# Patient Record
Sex: FEMALE | Race: WHITE | NOT HISPANIC OR LATINO | Employment: OTHER | URBAN - METROPOLITAN AREA
[De-identification: names, ages, dates, MRNs, and addresses within clinical notes are randomized per-mention and may not be internally consistent; named-entity substitution may affect disease eponyms.]

---

## 2021-11-09 ENCOUNTER — OFFICE VISIT (OUTPATIENT)
Dept: FAMILY MEDICINE CLINIC | Facility: CLINIC | Age: 66
End: 2021-11-09

## 2021-11-09 ENCOUNTER — HOSPITAL ENCOUNTER (OUTPATIENT)
Dept: GENERAL RADIOLOGY | Facility: HOSPITAL | Age: 66
Discharge: HOME | End: 2021-11-09
Admitting: NURSE PRACTITIONER

## 2021-11-09 VITALS
HEART RATE: 86 BPM | SYSTOLIC BLOOD PRESSURE: 106 MMHG | DIASTOLIC BLOOD PRESSURE: 65 MMHG | WEIGHT: 130 LBS | BODY MASS INDEX: 25.52 KG/M2 | HEIGHT: 60 IN | TEMPERATURE: 98.6 F | RESPIRATION RATE: 14 BRPM | OXYGEN SATURATION: 100 %

## 2021-11-09 DIAGNOSIS — R06.02 SHORTNESS OF BREATH: ICD-10-CM

## 2021-11-09 DIAGNOSIS — R05.9 COUGH: Primary | ICD-10-CM

## 2021-11-09 DIAGNOSIS — R07.9 CHEST PAIN, UNSPECIFIED TYPE: ICD-10-CM

## 2021-11-09 DIAGNOSIS — R05.9 COUGH: ICD-10-CM

## 2021-11-09 PROCEDURE — 93000 ELECTROCARDIOGRAM COMPLETE: CPT | Performed by: NURSE PRACTITIONER

## 2021-11-09 PROCEDURE — 99204 OFFICE O/P NEW MOD 45 MIN: CPT | Performed by: NURSE PRACTITIONER

## 2021-11-09 PROCEDURE — U0004 COV-19 TEST NON-CDC HGH THRU: HCPCS | Performed by: NURSE PRACTITIONER

## 2021-11-09 PROCEDURE — U0005 INFEC AGEN DETEC AMPLI PROBE: HCPCS | Performed by: NURSE PRACTITIONER

## 2021-11-09 PROCEDURE — 71046 X-RAY EXAM CHEST 2 VIEWS: CPT

## 2021-11-09 RX ORDER — AZITHROMYCIN 250 MG/1
TABLET, FILM COATED ORAL
Qty: 6 TABLET | Refills: 0 | Status: SHIPPED | OUTPATIENT
Start: 2021-11-09 | End: 2021-11-14 | Stop reason: HOSPADM

## 2021-11-09 RX ORDER — PREDNISONE 10 MG/1
TABLET ORAL
Qty: 1 EACH | Refills: 0 | Status: SHIPPED | OUTPATIENT
Start: 2021-11-09 | End: 2021-11-14 | Stop reason: HOSPADM

## 2021-11-09 RX ORDER — BENZONATATE 100 MG/1
100 CAPSULE ORAL 3 TIMES DAILY PRN
Qty: 30 CAPSULE | Refills: 1 | Status: SHIPPED | OUTPATIENT
Start: 2021-11-09 | End: 2021-11-16

## 2021-11-09 NOTE — PROGRESS NOTES
Procedure     ECG 12 Lead    Date/Time: 11/9/2021 2:13 PM  Performed by: Ellen Red APRN  Authorized by: Ellen Red APRN   Comparison: not compared with previous ECG   Previous ECG: no previous ECG available  Rhythm: sinus rhythm  Rate: normal  Conduction: conduction normal  ST Segments: ST segments normal  T Waves: T waves normal  QRS axis: normal  Other: no other findings    Clinical impression: normal ECG

## 2021-11-09 NOTE — PROGRESS NOTES
"Subjective   Duyen Odom is a 66 y.o. female presents for   Chief Complaint   Patient presents with   • URI     low grade fever over the last few days, loss of taste, decreased sense of smell, sore throat,        Health Maintenance Due   Topic Date Due   • MAMMOGRAM  Never done   • DXA SCAN  Never done   • COLORECTAL CANCER SCREENING  Never done   • COVID-19 Vaccine (1) Never done   • TDAP/TD VACCINES (1 - Tdap) Never done   • ZOSTER VACCINE (1 of 2) Never done   • INFLUENZA VACCINE  Never done   • Pneumococcal Vaccine 65+ (1 of 1 - PPSV23) Never done   • HEPATITIS C SCREENING  Never done   • ANNUAL WELLNESS VISIT  Never done       History of Present Illness   Pt present with concerns for covid.  She states she has a known contact positive for covid but states it was several weeks ago.  She states she has been having symptoms x 9 days.  She has been running a fever and lost sense of taste in august.  She tested positive for covid in August and was sick x 1 week.  She has not been vaccinated due to illness, and went for a scheduled appt at one time and the pharmacy was out of stock.  She states she currently is experiencing cough, shortness of breath and chest pain that radiates up to left ear.  She states she becomes short of breath and fatigued with activity.  She states she is always chilled.  She has been taking robitussin cough syrup and states it helps for a short period of time. She reports she has been experiencing lower extremity edema until the past week since she has been laying in bed the majority of the time.      Vitals:    11/09/21 1316   BP: 106/65   BP Location: Left arm   Patient Position: Sitting   Cuff Size: Adult   Pulse: 86   Resp: 14   Temp: 98.6 °F (37 °C)   TempSrc: Oral   SpO2: 100%   Weight: 59 kg (130 lb)   Height: 152.4 cm (60\")     Body mass index is 25.39 kg/m².    No current outpatient medications on file prior to visit.     No current facility-administered medications on file " prior to visit.       The following portions of the patient's history were reviewed and updated as appropriate: allergies, current medications, past family history, past medical history, past social history, past surgical history, and problem list.    Review of Systems   Constitutional: Positive for fatigue. Negative for chills and fever.   HENT: Negative for sinus pressure and sore throat.    Eyes: Negative for blurred vision.   Respiratory: Positive for cough and shortness of breath.    Cardiovascular: Positive for chest pain.   Gastrointestinal: Negative for abdominal pain.   Endocrine: Negative.    Genitourinary: Negative.    Musculoskeletal: Negative for arthralgias and joint swelling.   Skin: Negative for color change.   Allergic/Immunologic: Negative.    Neurological: Negative for dizziness.   Hematological: Negative.    Psychiatric/Behavioral: Negative for behavioral problems.       Objective   Physical Exam  Vitals and nursing note reviewed.   Constitutional:       Appearance: Normal appearance. She is well-developed. She is ill-appearing.   HENT:      Head: Normocephalic and atraumatic.      Right Ear: Tympanic membrane, ear canal and external ear normal.      Left Ear: Tympanic membrane, ear canal and external ear normal.      Nose: Congestion present.   Eyes:      Extraocular Movements: Extraocular movements intact.      Conjunctiva/sclera: Conjunctivae normal.      Pupils: Pupils are equal, round, and reactive to light.   Cardiovascular:      Rate and Rhythm: Normal rate and regular rhythm.      Pulses: Normal pulses.      Heart sounds: Normal heart sounds.   Pulmonary:      Effort: Pulmonary effort is normal.      Breath sounds: Wheezing (few, scattered) present.   Abdominal:      General: Bowel sounds are normal.      Palpations: Abdomen is soft.   Genitourinary:     Vagina: Normal.   Musculoskeletal:         General: Normal range of motion.      Cervical back: Normal range of motion and neck supple.    Skin:     General: Skin is warm and dry.   Neurological:      General: No focal deficit present.      Mental Status: She is alert and oriented to person, place, and time.   Psychiatric:         Mood and Affect: Mood normal.         Behavior: Behavior normal.         Judgment: Judgment normal.       PHQ-9 Total Score:      Assessment/Plan   Diagnoses and all orders for this visit:    1. Cough (Primary)  -     COVID-19,APTIMA PANTHER(LAURA),[facility], NP/OP SWAB IN UTM/VTM/SALINE TRANSPORT MEDIA,24 HR TAT - Swab, Nasal Cavity; Future  -     CBC Auto Differential; Future  -     Comprehensive Metabolic Panel; Future  -     COVID-19,APTIMA PANTHER(LAURA),[facility], NP/OP SWAB IN UTM/VTM/SALINE TRANSPORT MEDIA,24 HR TAT - Swab, Nasal Cavity  -     azithromycin (Zithromax Z-Aneesh) 250 MG tablet; Take 2 tablets by mouth on day 1, then 1 tablet daily on days 2-5  Dispense: 6 tablet; Refill: 0  -     benzonatate (Tessalon Perles) 100 MG capsule; Take 1 capsule by mouth 3 (Three) Times a Day As Needed for Cough.  Dispense: 30 capsule; Refill: 1    2. Shortness of breath  -     D-dimer, Quantitative; Future  -     BNP; Future  -     XR Chest PA & Lateral  -     predniSONE (DELTASONE) 10 MG (48) dose pack; Take as directed on pack  Dispense: 1 each; Refill: 0    3. Chest pain, unspecified type   -     BNP; Future  -     ECG 12 Lead        There are no Patient Instructions on file for this visit.

## 2021-11-10 ENCOUNTER — HOSPITAL ENCOUNTER (INPATIENT)
Facility: HOSPITAL | Age: 66
LOS: 3 days | Discharge: HOME | End: 2021-11-14
Attending: INTERNAL MEDICINE | Admitting: INTERNAL MEDICINE

## 2021-11-10 ENCOUNTER — APPOINTMENT (OUTPATIENT)
Dept: CT IMAGING | Facility: HOSPITAL | Age: 66
End: 2021-11-10

## 2021-11-10 DIAGNOSIS — R79.89 POSITIVE D-DIMER: ICD-10-CM

## 2021-11-10 DIAGNOSIS — R07.89 CHEST TIGHTNESS: ICD-10-CM

## 2021-11-10 DIAGNOSIS — R06.00 DYSPNEA, UNSPECIFIED TYPE: ICD-10-CM

## 2021-11-10 DIAGNOSIS — R06.09 DYSPNEA ON EXERTION: ICD-10-CM

## 2021-11-10 DIAGNOSIS — D64.9 ANEMIA, UNSPECIFIED TYPE: ICD-10-CM

## 2021-11-10 DIAGNOSIS — I21.4 NON-STEMI (NON-ST ELEVATED MYOCARDIAL INFARCTION) (HCC): Primary | ICD-10-CM

## 2021-11-10 DIAGNOSIS — R77.8 ELEVATED TROPONIN: ICD-10-CM

## 2021-11-10 PROBLEM — S09.90XA INJURY OF HEAD: Status: ACTIVE | Noted: 2018-08-22

## 2021-11-10 PROBLEM — M25.551 PAIN OF RIGHT HIP JOINT: Status: ACTIVE | Noted: 2018-08-22
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
## 2021-11-10 PROBLEM — Z86.16 HISTORY OF COVID-19: Chronic | Status: ACTIVE | Noted: 2021-11-10

## 2021-11-10 PROBLEM — M25.521 ARTHRALGIA OF RIGHT ELBOW: Status: ACTIVE | Noted: 2018-08-22

## 2021-11-10 LAB
ABO GROUP BLD: NORMAL
ALBUMIN SERPL-MCNC: 3.8 G/DL (ref 3.8–4.8)
ALBUMIN/GLOB SERPL: 1.4 {RATIO} (ref 1.2–2.2)
ALP SERPL-CCNC: 82 IU/L (ref 44–121)
ALT SERPL-CCNC: 9 IU/L (ref 0–32)
AMBIG ABBREV CMP14 DEFAULT: NORMAL
ANION GAP SERPL CALCULATED.3IONS-SCNC: 10 MMOL/L (ref 5–15)
APTT PPP: 24.1 SECONDS (ref 24–31)
AST SERPL-CCNC: 20 IU/L (ref 0–40)
B PARAPERT DNA SPEC QL NAA+PROBE: NOT DETECTED
B PERT DNA SPEC QL NAA+PROBE: NOT DETECTED
BASOPHILS # BLD AUTO: 0.1 10*3/MM3 (ref 0–0.2)
BASOPHILS # BLD AUTO: 0.1 X10E3/UL (ref 0–0.2)
BASOPHILS NFR BLD AUTO: 1 %
BASOPHILS NFR BLD AUTO: 1.9 % (ref 0–1.5)
BILIRUB SERPL-MCNC: 0.2 MG/DL (ref 0–1.2)
BLD GP AB SCN SERPL QL: NEGATIVE
BNP SERPL-MCNC: 96.3 PG/ML (ref 0–100)
BUN SERPL-MCNC: 17 MG/DL (ref 8–23)
BUN SERPL-MCNC: 17 MG/DL (ref 8–27)
BUN/CREAT SERPL: 13 (ref 12–28)
BUN/CREAT SERPL: 13.9 (ref 7–25)
C PNEUM DNA NPH QL NAA+NON-PROBE: NOT DETECTED
CALCIUM SERPL-MCNC: 8.5 MG/DL (ref 8.7–10.3)
CALCIUM SPEC-SCNC: 8 MG/DL (ref 8.6–10.5)
CHLORIDE SERPL-SCNC: 103 MMOL/L (ref 96–106)
CHLORIDE SERPL-SCNC: 104 MMOL/L (ref 98–107)
CO2 SERPL-SCNC: 20 MMOL/L (ref 20–29)
CO2 SERPL-SCNC: 24 MMOL/L (ref 22–29)
CREAT SERPL-MCNC: 1.22 MG/DL (ref 0.57–1)
CREAT SERPL-MCNC: 1.34 MG/DL (ref 0.57–1)
D DIMER PPP FEU-MCNC: 1.21 MG/L FEU (ref 0–0.49)
DEPRECATED RDW RBC AUTO: 43.8 FL (ref 37–54)
EOSINOPHIL # BLD AUTO: 0.1 10*3/MM3 (ref 0–0.4)
EOSINOPHIL # BLD AUTO: 0.1 X10E3/UL (ref 0–0.4)
EOSINOPHIL NFR BLD AUTO: 1 %
EOSINOPHIL NFR BLD AUTO: 1.6 % (ref 0.3–6.2)
ERYTHROCYTE [DISTWIDTH] IN BLOOD BY AUTOMATED COUNT: 19 % (ref 11.7–15.4)
ERYTHROCYTE [DISTWIDTH] IN BLOOD BY AUTOMATED COUNT: 20.7 % (ref 12.3–15.4)
FLUAV SUBTYP SPEC NAA+PROBE: NOT DETECTED
FLUBV RNA ISLT QL NAA+PROBE: NOT DETECTED
GFR SERPL CREATININE-BSD FRML MDRD: 44 ML/MIN/1.73
GLOBULIN SER CALC-MCNC: 2.7 G/DL (ref 1.5–4.5)
GLUCOSE SERPL-MCNC: 96 MG/DL (ref 65–99)
GLUCOSE SERPL-MCNC: ABNORMAL MG/DL
HADV DNA SPEC NAA+PROBE: NOT DETECTED
HCOV 229E RNA SPEC QL NAA+PROBE: NOT DETECTED
HCOV HKU1 RNA SPEC QL NAA+PROBE: NOT DETECTED
HCOV NL63 RNA SPEC QL NAA+PROBE: NOT DETECTED
HCOV OC43 RNA SPEC QL NAA+PROBE: NOT DETECTED
HCT VFR BLD AUTO: 20.7 % (ref 34–46.6)
HCT VFR BLD AUTO: 24.4 % (ref 34–46.6)
HGB BLD-MCNC: 6.2 G/DL (ref 12–15.9)
HGB BLD-MCNC: 6.3 G/DL (ref 11.1–15.9)
HMPV RNA NPH QL NAA+NON-PROBE: NOT DETECTED
HOLD SPECIMEN: NORMAL
HPIV1 RNA SPEC QL NAA+PROBE: NOT DETECTED
HPIV2 RNA SPEC QL NAA+PROBE: NOT DETECTED
HPIV3 RNA NPH QL NAA+PROBE: NOT DETECTED
HPIV4 P GENE NPH QL NAA+PROBE: NOT DETECTED
IMM GRANULOCYTES # BLD AUTO: 0 X10E3/UL (ref 0–0.1)
IMM GRANULOCYTES NFR BLD AUTO: 0 %
INR PPP: 0.94 (ref 0.93–1.1)
LYMPHOCYTES # BLD AUTO: 1.2 10*3/MM3 (ref 0.7–3.1)
LYMPHOCYTES # BLD AUTO: 1.2 X10E3/UL (ref 0.7–3.1)
LYMPHOCYTES NFR BLD AUTO: 17 %
LYMPHOCYTES NFR BLD AUTO: 18.2 % (ref 19.6–45.3)
M PNEUMO IGG SER IA-ACNC: NOT DETECTED
MAGNESIUM SERPL-MCNC: 1.9 MG/DL (ref 1.6–2.4)
MCH RBC QN AUTO: 16.5 PG (ref 26.6–33)
MCH RBC QN AUTO: 18 PG (ref 26.6–33)
MCHC RBC AUTO-ENTMCNC: 25.8 G/DL (ref 31.5–35.7)
MCHC RBC AUTO-ENTMCNC: 30 G/DL (ref 31.5–35.7)
MCV RBC AUTO: 59.9 FL (ref 79–97)
MCV RBC AUTO: 64 FL (ref 79–97)
MICROCYTES BLD QL: NORMAL
MONOCYTES # BLD AUTO: 0.7 10*3/MM3 (ref 0.1–0.9)
MONOCYTES # BLD AUTO: 0.7 X10E3/UL (ref 0.1–0.9)
MONOCYTES NFR BLD AUTO: 10 %
MONOCYTES NFR BLD AUTO: 10.8 % (ref 5–12)
NEUTROPHILS # BLD AUTO: 5 X10E3/UL (ref 1.4–7)
NEUTROPHILS NFR BLD AUTO: 4.3 10*3/MM3 (ref 1.7–7)
NEUTROPHILS NFR BLD AUTO: 67.5 % (ref 42.7–76)
NEUTROPHILS NFR BLD AUTO: 71 %
NRBC BLD AUTO-RTO: 0.1 /100 WBC (ref 0–0.2)
NT-PROBNP SERPL-MCNC: 1187 PG/ML (ref 0–900)
PHOSPHATE SERPL-MCNC: 3.3 MG/DL (ref 2.5–4.5)
PLAT MORPH BLD: NORMAL
PLATELET # BLD AUTO: 484 10*3/MM3 (ref 140–450)
PLATELET # BLD AUTO: 527 X10E3/UL (ref 150–450)
PMV BLD AUTO: 7.1 FL (ref 6–12)
POTASSIUM SERPL-SCNC: 4.3 MMOL/L (ref 3.5–5.2)
POTASSIUM SERPL-SCNC: ABNORMAL MMOL/L
PROCALCITONIN SERPL-MCNC: 0.06 NG/ML (ref 0–0.25)
PROT SERPL-MCNC: 6.5 G/DL (ref 6–8.5)
PROTHROMBIN TIME: 10.5 SECONDS (ref 9.6–11.7)
QT INTERVAL: 374 MS
RBC # BLD AUTO: 3.46 10*6/MM3 (ref 3.77–5.28)
RBC # BLD AUTO: 3.82 X10E6/UL (ref 3.77–5.28)
RH BLD: POSITIVE
RHINOVIRUS RNA SPEC NAA+PROBE: NOT DETECTED
RSV RNA NPH QL NAA+NON-PROBE: NOT DETECTED
SARS-COV-2 ORF1AB RESP QL NAA+PROBE: NOT DETECTED
SARS-COV-2 RNA NPH QL NAA+NON-PROBE: NOT DETECTED
SODIUM SERPL-SCNC: 138 MMOL/L (ref 136–145)
SODIUM SERPL-SCNC: 140 MMOL/L (ref 134–144)
T&S EXPIRATION DATE: NORMAL
TROPONIN T SERPL-MCNC: 0.8 NG/ML (ref 0–0.03)
TROPONIN T SERPL-MCNC: 0.9 NG/ML (ref 0–0.03)
WBC # BLD AUTO: 6.4 10*3/MM3 (ref 3.4–10.8)
WBC # BLD AUTO: 7 X10E3/UL (ref 3.4–10.8)
WBC MORPH BLD: NORMAL
WHOLE BLOOD HOLD SPECIMEN: NORMAL

## 2021-11-10 PROCEDURE — 84484 ASSAY OF TROPONIN QUANT: CPT | Performed by: NURSE PRACTITIONER

## 2021-11-10 PROCEDURE — 25010000002 DIPHENHYDRAMINE PER 50 MG: Performed by: NURSE PRACTITIONER

## 2021-11-10 PROCEDURE — 86900 BLOOD TYPING SEROLOGIC ABO: CPT

## 2021-11-10 PROCEDURE — 80048 BASIC METABOLIC PNL TOTAL CA: CPT | Performed by: NURSE PRACTITIONER

## 2021-11-10 PROCEDURE — 93005 ELECTROCARDIOGRAM TRACING: CPT | Performed by: NURSE PRACTITIONER

## 2021-11-10 PROCEDURE — 86900 BLOOD TYPING SEROLOGIC ABO: CPT | Performed by: NURSE PRACTITIONER

## 2021-11-10 PROCEDURE — 85730 THROMBOPLASTIN TIME PARTIAL: CPT | Performed by: NURSE PRACTITIONER

## 2021-11-10 PROCEDURE — G0378 HOSPITAL OBSERVATION PER HR: HCPCS

## 2021-11-10 PROCEDURE — 86923 COMPATIBILITY TEST ELECTRIC: CPT

## 2021-11-10 PROCEDURE — 86901 BLOOD TYPING SEROLOGIC RH(D): CPT | Performed by: NURSE PRACTITIONER

## 2021-11-10 PROCEDURE — 36415 COLL VENOUS BLD VENIPUNCTURE: CPT | Performed by: NURSE PRACTITIONER

## 2021-11-10 PROCEDURE — 71275 CT ANGIOGRAPHY CHEST: CPT

## 2021-11-10 PROCEDURE — 74176 CT ABD & PELVIS W/O CONTRAST: CPT

## 2021-11-10 PROCEDURE — 86850 RBC ANTIBODY SCREEN: CPT | Performed by: NURSE PRACTITIONER

## 2021-11-10 PROCEDURE — 84145 PROCALCITONIN (PCT): CPT | Performed by: NURSE PRACTITIONER

## 2021-11-10 PROCEDURE — 84466 ASSAY OF TRANSFERRIN: CPT | Performed by: INTERNAL MEDICINE

## 2021-11-10 PROCEDURE — 82306 VITAMIN D 25 HYDROXY: CPT | Performed by: SURGERY

## 2021-11-10 PROCEDURE — 85007 BL SMEAR W/DIFF WBC COUNT: CPT | Performed by: NURSE PRACTITIONER

## 2021-11-10 PROCEDURE — 82728 ASSAY OF FERRITIN: CPT | Performed by: INTERNAL MEDICINE

## 2021-11-10 PROCEDURE — 83735 ASSAY OF MAGNESIUM: CPT | Performed by: NURSE PRACTITIONER

## 2021-11-10 PROCEDURE — 86901 BLOOD TYPING SEROLOGIC RH(D): CPT

## 2021-11-10 PROCEDURE — 99222 1ST HOSP IP/OBS MODERATE 55: CPT | Performed by: NURSE PRACTITIONER

## 2021-11-10 PROCEDURE — 0202U NFCT DS 22 TRGT SARS-COV-2: CPT | Performed by: NURSE PRACTITIONER

## 2021-11-10 PROCEDURE — 85379 FIBRIN DEGRADATION QUANT: CPT | Performed by: NURSE PRACTITIONER

## 2021-11-10 PROCEDURE — 84100 ASSAY OF PHOSPHORUS: CPT | Performed by: NURSE PRACTITIONER

## 2021-11-10 PROCEDURE — 25010000002 METHYLPREDNISOLONE PER 125 MG: Performed by: NURSE PRACTITIONER

## 2021-11-10 PROCEDURE — 99284 EMERGENCY DEPT VISIT MOD MDM: CPT

## 2021-11-10 PROCEDURE — P9016 RBC LEUKOCYTES REDUCED: HCPCS

## 2021-11-10 PROCEDURE — 85610 PROTHROMBIN TIME: CPT | Performed by: NURSE PRACTITIONER

## 2021-11-10 PROCEDURE — 83540 ASSAY OF IRON: CPT | Performed by: INTERNAL MEDICINE

## 2021-11-10 PROCEDURE — 83880 ASSAY OF NATRIURETIC PEPTIDE: CPT | Performed by: NURSE PRACTITIONER

## 2021-11-10 PROCEDURE — 36430 TRANSFUSION BLD/BLD COMPNT: CPT

## 2021-11-10 PROCEDURE — 93010 ELECTROCARDIOGRAM REPORT: CPT | Performed by: INTERNAL MEDICINE

## 2021-11-10 PROCEDURE — 85025 COMPLETE CBC W/AUTO DIFF WBC: CPT | Performed by: NURSE PRACTITIONER

## 2021-11-10 PROCEDURE — 0 IOPAMIDOL PER 1 ML: Performed by: INTERNAL MEDICINE

## 2021-11-10 RX ORDER — DIPHENHYDRAMINE HYDROCHLORIDE 50 MG/ML
25 INJECTION INTRAMUSCULAR; INTRAVENOUS ONCE
Status: COMPLETED | OUTPATIENT
Start: 2021-11-10 | End: 2021-11-10

## 2021-11-10 RX ORDER — PANTOPRAZOLE SODIUM 40 MG/10ML
40 INJECTION, POWDER, LYOPHILIZED, FOR SOLUTION INTRAVENOUS EVERY 12 HOURS SCHEDULED
Status: DISCONTINUED | OUTPATIENT
Start: 2021-11-11 | End: 2021-11-14 | Stop reason: HOSPADM

## 2021-11-10 RX ORDER — CALCIUM GLUCONATE 20 MG/ML
1 INJECTION, SOLUTION INTRAVENOUS AS NEEDED
Status: DISCONTINUED | OUTPATIENT
Start: 2021-11-10 | End: 2021-11-14 | Stop reason: HOSPADM

## 2021-11-10 RX ORDER — SODIUM CHLORIDE 0.9 % (FLUSH) 0.9 %
10 SYRINGE (ML) INJECTION AS NEEDED
Status: DISCONTINUED | OUTPATIENT
Start: 2021-11-10 | End: 2021-11-14 | Stop reason: HOSPADM

## 2021-11-10 RX ORDER — DIPHENOXYLATE HYDROCHLORIDE AND ATROPINE SULFATE 2.5; .025 MG/1; MG/1
1 TABLET ORAL DAILY
Status: DISCONTINUED | OUTPATIENT
Start: 2021-11-10 | End: 2021-11-10

## 2021-11-10 RX ORDER — ONDANSETRON 2 MG/ML
4 INJECTION INTRAMUSCULAR; INTRAVENOUS EVERY 6 HOURS PRN
Status: DISCONTINUED | OUTPATIENT
Start: 2021-11-10 | End: 2021-11-12

## 2021-11-10 RX ORDER — CALCIUM GLUCONATE 20 MG/ML
2 INJECTION, SOLUTION INTRAVENOUS AS NEEDED
Status: DISCONTINUED | OUTPATIENT
Start: 2021-11-10 | End: 2021-11-14 | Stop reason: HOSPADM

## 2021-11-10 RX ORDER — ACETAMINOPHEN 160 MG/5ML
650 SOLUTION ORAL EVERY 4 HOURS PRN
Status: DISCONTINUED | OUTPATIENT
Start: 2021-11-10 | End: 2021-11-14 | Stop reason: HOSPADM

## 2021-11-10 RX ORDER — ALUMINA, MAGNESIA, AND SIMETHICONE 2400; 2400; 240 MG/30ML; MG/30ML; MG/30ML
15 SUSPENSION ORAL EVERY 6 HOURS PRN
Status: DISCONTINUED | OUTPATIENT
Start: 2021-11-10 | End: 2021-11-10

## 2021-11-10 RX ORDER — MAGNESIUM SULFATE HEPTAHYDRATE 40 MG/ML
2 INJECTION, SOLUTION INTRAVENOUS AS NEEDED
Status: DISCONTINUED | OUTPATIENT
Start: 2021-11-10 | End: 2021-11-14 | Stop reason: HOSPADM

## 2021-11-10 RX ORDER — ONDANSETRON 4 MG/1
4 TABLET, FILM COATED ORAL EVERY 6 HOURS PRN
Status: DISCONTINUED | OUTPATIENT
Start: 2021-11-10 | End: 2021-11-12

## 2021-11-10 RX ORDER — MAGNESIUM SULFATE HEPTAHYDRATE 40 MG/ML
4 INJECTION, SOLUTION INTRAVENOUS AS NEEDED
Status: DISCONTINUED | OUTPATIENT
Start: 2021-11-10 | End: 2021-11-14 | Stop reason: HOSPADM

## 2021-11-10 RX ORDER — ACETAMINOPHEN 650 MG/1
650 SUPPOSITORY RECTAL EVERY 4 HOURS PRN
Status: DISCONTINUED | OUTPATIENT
Start: 2021-11-10 | End: 2021-11-14 | Stop reason: HOSPADM

## 2021-11-10 RX ORDER — CHOLECALCIFEROL (VITAMIN D3) 125 MCG
5 CAPSULE ORAL NIGHTLY PRN
Status: DISCONTINUED | OUTPATIENT
Start: 2021-11-10 | End: 2021-11-10

## 2021-11-10 RX ORDER — ACETAMINOPHEN 325 MG/1
650 TABLET ORAL EVERY 4 HOURS PRN
Status: DISCONTINUED | OUTPATIENT
Start: 2021-11-10 | End: 2021-11-14 | Stop reason: HOSPADM

## 2021-11-10 RX ORDER — METHYLPREDNISOLONE SODIUM SUCCINATE 125 MG/2ML
125 INJECTION, POWDER, LYOPHILIZED, FOR SOLUTION INTRAMUSCULAR; INTRAVENOUS ONCE
Status: COMPLETED | OUTPATIENT
Start: 2021-11-10 | End: 2021-11-10

## 2021-11-10 RX ORDER — NITROGLYCERIN 0.4 MG/1
0.4 TABLET SUBLINGUAL
Status: DISCONTINUED | OUTPATIENT
Start: 2021-11-10 | End: 2021-11-14 | Stop reason: HOSPADM

## 2021-11-10 RX ORDER — SODIUM CHLORIDE 0.9 % (FLUSH) 0.9 %
10 SYRINGE (ML) INJECTION EVERY 12 HOURS SCHEDULED
Status: DISCONTINUED | OUTPATIENT
Start: 2021-11-10 | End: 2021-11-14 | Stop reason: HOSPADM

## 2021-11-10 RX ORDER — POTASSIUM CHLORIDE 1.5 G/1.77G
40 POWDER, FOR SOLUTION ORAL AS NEEDED
Status: DISCONTINUED | OUTPATIENT
Start: 2021-11-10 | End: 2021-11-10

## 2021-11-10 RX ORDER — POTASSIUM CHLORIDE 20 MEQ/1
40 TABLET, EXTENDED RELEASE ORAL AS NEEDED
Status: DISCONTINUED | OUTPATIENT
Start: 2021-11-10 | End: 2021-11-10

## 2021-11-10 RX ADMIN — DIPHENHYDRAMINE HYDROCHLORIDE 25 MG: 50 INJECTION, SOLUTION INTRAMUSCULAR; INTRAVENOUS at 20:37

## 2021-11-10 RX ADMIN — DIPHENHYDRAMINE HYDROCHLORIDE 25 MG: 50 INJECTION, SOLUTION INTRAMUSCULAR; INTRAVENOUS at 17:26

## 2021-11-10 RX ADMIN — SODIUM CHLORIDE 1000 ML: 9 INJECTION, SOLUTION INTRAVENOUS at 15:17

## 2021-11-10 RX ADMIN — IOPAMIDOL 100 ML: 755 INJECTION, SOLUTION INTRAVENOUS at 21:06

## 2021-11-10 RX ADMIN — SODIUM CHLORIDE, PRESERVATIVE FREE 10 ML: 5 INJECTION INTRAVENOUS at 20:40

## 2021-11-10 RX ADMIN — THERA TABS 1 TABLET: TAB at 21:11

## 2021-11-10 RX ADMIN — METHYLPREDNISOLONE SODIUM SUCCINATE 125 MG: 125 INJECTION, POWDER, FOR SOLUTION INTRAMUSCULAR; INTRAVENOUS at 17:26

## 2021-11-10 NOTE — ED TRIAGE NOTES
Pt reports not feeling well for past 2 weeks, has had productive cough, congestion and SOA. Pt reports BLE swelling intermittently since August. Pt seen at PCP office yesterday and had blood work done, pt called from office today statins Ddimer level was elevated.

## 2021-11-10 NOTE — ED PROVIDER NOTES
Subjective   History: Patient is a 66-year-old female was called by her primary office to come to ER immediately for elevated dimer that was drawn yesterday in the office.  Saw her PCP yesterday for cough weakness shortness of breath some chest discomfort and leg swelling.  She had a EKG completed chest x-ray blood drawn.  States she has been taking Robitussin over-the-counter for cough.  She was prescribed a Z-Aneesh benzonatate and prednisone in which she has taken 1 dose of the Z-Aneesh starting today.  Chest pain is tightness, fairly constant radiates left ear nothing makes it better or worse.  Has been running a fever but states no fever in the last 48 hours.  She states she does have a history of pulmonary embolism years ago after kidney stone surgery was on blood thinners temporarily afterwards.  Denies blood thinners at this time.  Denies any bloody black tarry stool.      Onset: 1 month worse in the last 2 weeks  Location:   Duration: Waxes wanes  Character: Dyspnea  Aggravating/Alleviating factors: None  Radiation not applicable  Severity: Mild to moderate            Review of Systems   Constitutional: Positive for fatigue and fever. Negative for chills.   HENT: Negative for congestion, sore throat, tinnitus and trouble swallowing.    Eyes: Negative for photophobia, discharge and visual disturbance.   Respiratory: Positive for cough, chest tightness and shortness of breath.    Cardiovascular: Positive for leg swelling. Negative for chest pain and palpitations.   Gastrointestinal: Negative for abdominal pain, diarrhea, nausea and vomiting.   Genitourinary: Negative for dysuria, frequency and urgency.   Musculoskeletal: Negative for back pain and myalgias.   Skin: Negative for rash.   Neurological: Positive for weakness. Negative for dizziness and headaches.   Psychiatric/Behavioral: Negative for confusion.       Past Medical History:   Diagnosis Date   • Anemia    • Chest tightness    • Elevated troponin    •  High platelet count    • Hyperlipidemia    • Hypertension    • Positive D-dimer        Allergies   Allergen Reactions   • Iodinated Diagnostic Agents Hives     HIVES ALL OVER CHEST DURING IVP       Past Surgical History:   Procedure Laterality Date   • ABDOMINAL SURGERY      lap band   •  SECTION     • HYSTERECTOMY     • LAPAROSCOPIC GASTRIC BANDING         Family History   Problem Relation Age of Onset   • Mental illness Mother    • Hypertension Mother    • Diabetes Father    • Heart disease Father        Social History     Socioeconomic History   • Marital status:    Tobacco Use   • Smoking status: Never Smoker   • Smokeless tobacco: Never Used   Vaping Use   • Vaping Use: Never used   Substance and Sexual Activity   • Alcohol use: Yes     Comment: occ.   • Drug use: Never   • Sexual activity: Defer           Objective   Physical Exam  Vitals reviewed.   Constitutional:       General: She is not in acute distress.     Appearance: She is normal weight. She is not toxic-appearing.   HENT:      Head: Normocephalic and atraumatic.      Right Ear: Tympanic membrane normal.      Left Ear: Tympanic membrane normal.      Nose: Nose normal.      Mouth/Throat:      Mouth: Mucous membranes are moist.      Pharynx: Oropharynx is clear.   Eyes:      Extraocular Movements: Extraocular movements intact.      Pupils: Pupils are equal, round, and reactive to light.   Cardiovascular:      Rate and Rhythm: Tachycardia present.      Pulses: Normal pulses.      Heart sounds: Normal heart sounds. No murmur heard.      Pulmonary:      Effort: Pulmonary effort is normal.      Breath sounds: Normal breath sounds. No wheezing, rhonchi or rales.   Abdominal:      General: Abdomen is flat.      Palpations: Abdomen is soft.      Tenderness: There is no abdominal tenderness. There is no guarding or rebound.   Genitourinary:     Rectum: Guaiac result negative.   Musculoskeletal:         General: Normal range of motion.       "Cervical back: Normal range of motion and neck supple.   Skin:     General: Skin is warm and dry.      Coloration: Skin is pale.      Findings: No rash.   Neurological:      Mental Status: She is alert and oriented to person, place, and time.   Psychiatric:         Mood and Affect: Mood normal.         Behavior: Behavior normal.         Thought Content: Thought content normal.         Judgment: Judgment normal.         Procedures           ED Course  ED Course as of 11/11/21 0107   Wed Nov 10, 2021   1731 Dr petersen requested hematology consult, placed in computer [KJ]      ED Course User Index  [KJ] Alba Becerra, APRN      /91 (BP Location: Left arm, Patient Position: Lying)   Pulse 102   Temp 98.1 °F (36.7 °C) (Oral)   Resp 17   Ht 152.4 cm (60\")   Wt 60.1 kg (132 lb 7.9 oz)   SpO2 98%   BMI 25.88 kg/m²   Labs Reviewed   BASIC METABOLIC PANEL - Abnormal; Notable for the following components:       Result Value    Creatinine 1.22 (*)     Calcium 8.0 (*)     eGFR Non  Amer 44 (*)     All other components within normal limits    Narrative:     GFR Normal >60  Chronic Kidney Disease <60  Kidney Failure <15     CBC WITH AUTO DIFFERENTIAL - Abnormal; Notable for the following components:    RBC 3.46 (*)     Hemoglobin 6.2 (*)     Hematocrit 20.7 (*)     MCV 59.9 (*)     MCH 18.0 (*)     MCHC 30.0 (*)     RDW 20.7 (*)     Platelets 484 (*)     Lymphocyte % 18.2 (*)     Basophil % 1.9 (*)     All other components within normal limits    Narrative:     Appended report. These results have been appended to a previously verified report.   TROPONIN (IN-HOUSE) - Abnormal; Notable for the following components:    Troponin T 0.899 (*)     All other components within normal limits    Narrative:     Troponin T Reference Range:  <= 0.03 ng/mL-   Negative for AMI  >0.03 ng/mL-     Abnormal for myocardial necrosis.  Clinicians would have to utilize clinical acumen, EKG, Troponin and serial changes to determine if " it is an Acute Myocardial Infarction or myocardial injury due to an underlying chronic condition.       Results may be falsely decreased if patient taking Biotin.     BNP (IN-HOUSE) - Abnormal; Notable for the following components:    proBNP 1,187.0 (*)     All other components within normal limits    Narrative:     Among patients with dyspnea, NT-proBNP is highly sensitive for the detection of acute congestive heart failure. In addition NT-proBNP of <300 pg/ml effectively rules out acute congestive heart failure with 99% negative predictive value.    Results may be falsely decreased if patient taking Biotin.     TROPONIN (IN-HOUSE) - Abnormal; Notable for the following components:    Troponin T 0.799 (*)     All other components within normal limits    Narrative:     Troponin T Reference Range:  <= 0.03 ng/mL-   Negative for AMI  >0.03 ng/mL-     Abnormal for myocardial necrosis.  Clinicians would have to utilize clinical acumen, EKG, Troponin and serial changes to determine if it is an Acute Myocardial Infarction or myocardial injury due to an underlying chronic condition.       Results may be falsely decreased if patient taking Biotin.     D-DIMER, QUANTITATIVE - Abnormal; Notable for the following components:    D-Dimer, Quantitative 1.10 (*)     All other components within normal limits    Narrative:     Reference Range  --------------------------------------------------------------------     < 0.50   Negative Predictive Value  0.50-0.59   Indeterminate    >= 0.60   Probable VTE             A very low percentage of patients with DVT may yield D-Dimer results   below the cut-off of 0.50 mg/L FEU.  This is known to be more   prevalent in patients with distal DVT.             Results of this test should always be interpreted in conjunction with   the patient's medical history, clinical presentation and other   findings.  Clinical diagnosis should not be based on the result of   [place] D-Dimer alone.  "  RESPIRATORY PANEL PCR W/ COVID-19 (SARS-COV-2) [facility]-HOUSE, NP SWAB IN UTM/VTP, 3-4 HR TAT - Normal    Narrative:     In the setting of a positive respiratory panel with a viral infection PLUS a negative procalcitonin without other underlying concern for bacterial infection, consider observing off antibiotics or discontinuation of antibiotics and continue supportive care. If the respiratory panel is positive for atypical bacterial infection (Bordetella pertussis, Chlamydophila pneumoniae, or Mycoplasma pneumoniae), consider antibiotic de-escalation to target atypical bacterial infection.   MAGNESIUM - Normal   PHOSPHORUS - Normal   PROCALCITONIN - Normal    Narrative:     As a Marker for Sepsis (Non-Neonates):     1. <0.5 ng/mL represents a low risk of severe sepsis and/or septic shock.  2. >2 ng/mL represents a high risk of severe sepsis and/or septic shock.    As a Marker for Lower Respiratory Tract Infections that require antibiotic therapy:  PCT on Admission     Antibiotic Therapy             6-12 Hrs later  >0.5                          Strongly Recommended            >0.25 - <0.5             Recommended  0.1 - 0.25                  Discouraged                       Remeasure/reassess PCT  <0.1                         Strongly Discouraged         Remeasure/reassess PCT      As 28 day mortality risk marker: \"Change in Procalcitonin Result\" (>80% or <=80%) if Day 0 (or Day 1) and Day 4 values are available. Refer to http://www.[employer]-pct-calculator.com/    Change in PCT <=80 %   A decrease of PCT levels below or equal to 80% defines a positive change in PCT test result representing a higher risk for 28-day all-cause mortality of patients diagnosed with severe sepsis or septic shock.    Change in PCT >80 %   A decrease of PCT levels of more than 80% defines a negative change in PCT result representing a lower risk for 28-day all-cause mortality of patients diagnosed with severe sepsis or " septic shock.               PROTIME-INR - Normal   APTT - Normal   SCAN SLIDE   URINE DRUG SCREEN   TROPONIN (IN-HOUSE)   URINALYSIS W/ CULTURE IF INDICATED   HEMOGLOBIN AND HEMATOCRIT, BLOOD   BASIC METABOLIC PANEL   MAGNESIUM   HEMOGLOBIN AND HEMATOCRIT, BLOOD   CBC WITH AUTO DIFFERENTIAL   TYPE AND SCREEN   BB ARMBAND CHECK   PREPARE RBC   CBC AND DIFFERENTIAL    Narrative:     The following orders were created for panel order CBC & Differential.  Procedure                               Abnormality         Status                     ---------                               -----------         ------                     CBC Auto Differential[[id number]]        Abnormal            Final result               Scan Slide[[id number]]                                       Final result                 Please view results for these tests on the individual orders.   EXTRA TUBES    Narrative:     The following orders were created for panel order Extra Tubes.  Procedure                               Abnormality         Status                     ---------                               -----------         ------                     Gold Top - SST[[id number]]                                   Final result               Light Blue Top[[id number]]                                   Final result                 Please view results for these tests on the individual orders.   GOLD TOP - SST   LIGHT BLUE TOP   CBC AND DIFFERENTIAL    Narrative:     The following orders were created for panel order CBC & Differential.  Procedure                               Abnormality         Status                     ---------                               -----------         ------                     CBC Auto Differential[[id number]]                                                         Please view results for these tests on the individual orders.     Medications   sodium chloride 0.9 % flush 10 mL (has no administration in time range)    nitroglycerin (NITROSTAT) SL tablet 0.4 mg (has no administration in time range)   sodium chloride 0.9 % flush 10 mL (10 mL Intravenous Given 11/10/21 2040)   sodium chloride 0.9 % flush 10 mL (has no administration in time range)   acetaminophen (TYLENOL) tablet 650 mg (has no administration in time range)     Or   acetaminophen (TYLENOL) 160 MG/5ML solution 650 mg (has no administration in time range)     Or   acetaminophen (TYLENOL) suppository 650 mg (has no administration in time range)   ondansetron (ZOFRAN) tablet 4 mg (has no administration in time range)     Or   ondansetron (ZOFRAN) injection 4 mg (has no administration in time range)   Magnesium Sulfate 2 gram Bolus, followed by 8 gram infusion (total Mg dose 10 grams)- Mg less than or equal to 1mg/dL (has no administration in time range)     Or   Magnesium Sulfate 2 gram / 50mL Infusion (GIVE X 3 BAGS TO EQUAL 6GM TOTAL DOSE) - Mg 1.1 - 1.5 mg/dl (has no administration in time range)     Or   Magnesium Sulfate 4 gram infusion- Mg 1.6-1.9 mg/dL (has no administration in time range)   calcium gluconate 1g/50ml 0.675% NaCl IV SOLN (has no administration in time range)     And   calcium gluconate 2-0.675 GM/100ML NACL IVPB (has no administration in time range)   pantoprazole (PROTONIX) injection 40 mg (40 mg Intravenous Given 11/11/21 0030)   sodium chloride 0.9 % bolus 1,000 mL (0 mL Intravenous Stopped 11/10/21 1700)   diphenhydrAMINE (BENADRYL) injection 25 mg (25 mg Intravenous Given 11/10/21 1726)   methylPREDNISolone sodium succinate (SOLU-Medrol) injection 125 mg (125 mg Intravenous Given 11/10/21 1726)   diphenhydrAMINE (BENADRYL) injection 25 mg (25 mg Intravenous Given 11/10/21 2037)   iopamidol (ISOVUE-370) 76 % injection 100 mL (100 mL Intravenous Given 11/10/21 2106)     CT Chest Pulmonary Embolism    Result Date: 11/10/2021   1. No acute pulmonary embolic disease. 2. The esophagus is markedly dilated and fluid-filled to the level of the  thoracic inlet. The patient has had a previous gastric lap band procedure. This suggests that the gastric lap band may have too much tension obstructing the gastroesophageal junction. 3. No active pulmonary disease. 4. Additional incidental findings as noted above.  Electronically Signed By-Car Jackson MD On:11/10/2021 9:16 PM This report was finalized on [id number] by  Car Jackson MD.    XR Chest PA & Lateral    Result Date: 11/9/2021  No acute chest finding.  Electronically Signed By-Edel Glass MD On:11/9/2021 4:14 PM This report was finalized on [id number] by  Edel Glass MD.                                         MDM     I examined the patient using the appropriate personal protective equipment.      DISPOSITION:   Chart Review: Blood draw yesterday 11/9/2021 dimer elevated 1.21.  H&H 6.3 and 24.4 platelets 527  Comorbidity:  has a past medical history of Anemia, Chest tightness, Elevated troponin, High platelet count, Hyperlipidemia, Hypertension, and Positive D-dimer.  Differentials:this list is not all inclusive and does not constitute the entirety of considered causes --> STEMI non-STEMI PE pneumonia anemia  ECG: interpreted by ER physician and reviewed by myself:   Labs: CBC H&H 6.2 and 20.7 platelets 44.  Troponin 0 0.899.  Metabolic panel creatinine 1.22 GFR 44.  Blood type O+.  Respiratory panel negative.    Imaging: Was interpreted by physician and reviewed by myself:  CT Chest Pulmonary Embolism    Result Date: 11/10/2021   1. No acute pulmonary embolic disease. 2. The esophagus is markedly dilated and fluid-filled to the level of the thoracic inlet. The patient has had a previous gastric lap band procedure. This suggests that the gastric lap band may have too much tension obstructing the gastroesophageal junction. 3. No active pulmonary disease. 4. Additional incidental findings as noted above.  Electronically Signed By-Car Jackson MD On:11/10/2021 9:16 PM This report was finalized on  [id number] by  Car Jackson MD.    XR Chest PA & Lateral    Result Date: 11/9/2021  No acute chest finding.  Electronically Signed By-Edel Glass MD On:11/9/2021 4:14 PM This report was finalized on [id number] by  Edel Glass MD.      Disposition/Treatment:    IV initiated blood drawn 1 L normal saline given.  Patient does present pale and states she has been short of breath developed some chest tightness over the last 3 days.  H&H 6.2 and 20.7 platelets 44 these results fairly unchanged from yesterday's results.  Elevated troponin 0.899 likely secondary to anemia.  Metabolic panel creatinine 1.22 GFR 44.  PCR panel negative.  Likely patient shortness of breath due to anemia.  She denies any bloody black or tarry stool.  Guaiac stool negative.  Initially patient was sent to CT for CT PE told CT tech she had allergic reaction to contrast dye approximately 30 years ago which gave her hives.  Patient was brought back to the ER.  After discussing with my attending, Dr. Brandt will premedicate patient and continue with CT PE protocol, results pending. Pt is currently 100% on RA, HR 80.  Patient okay with proceeding.  Also completed type and screen and ordered to transfuse 1 unit of PRBC.  Patient will be admitted for further evaluation of anemia, blood transfusion, trend troponins.  Additional lab work BNP ordered.  Pending BNP and CT PE protocol. Admitted to hospitalist services under dr petersen.       Final diagnoses:   Dyspnea, unspecified type   Anemia, unspecified type   Chest tightness   Positive D-dimer   Elevated troponin       ED Disposition  ED Disposition     ED Disposition Condition Comment    Decision to Admit  Level of Care: Telemetry [5]   Diagnosis: Dyspnea, unspecified type [[id number]]   Admitting Physician: BUBBA PETERSEN [[id number]]   Attending Physician: BUBBA PETERSEN [[id number]]            No follow-up provider specified.       Medication List      No changes were made to your prescriptions  during this visit.          Alba Becerra, APRN  11/11/21 0107

## 2021-11-11 ENCOUNTER — APPOINTMENT (OUTPATIENT)
Dept: CARDIOLOGY | Facility: HOSPITAL | Age: 66
End: 2021-11-11

## 2021-11-11 ENCOUNTER — APPOINTMENT (OUTPATIENT)
Dept: NUCLEAR MEDICINE | Facility: HOSPITAL | Age: 66
End: 2021-11-11

## 2021-11-11 ENCOUNTER — INPATIENT HOSPITAL (OUTPATIENT)
Dept: URBAN - METROPOLITAN AREA HOSPITAL 84 | Facility: HOSPITAL | Age: 66
End: 2021-11-11

## 2021-11-11 DIAGNOSIS — R79.89 OTHER SPECIFIED ABNORMAL FINDINGS OF BLOOD CHEMISTRY: ICD-10-CM

## 2021-11-11 DIAGNOSIS — D50.9 IRON DEFICIENCY ANEMIA, UNSPECIFIED: ICD-10-CM

## 2021-11-11 DIAGNOSIS — K59.04 CHRONIC IDIOPATHIC CONSTIPATION: ICD-10-CM

## 2021-11-11 DIAGNOSIS — Z98.84 BARIATRIC SURGERY STATUS: ICD-10-CM

## 2021-11-11 LAB
25(OH)D3 SERPL-MCNC: 7.2 NG/ML
AMPHET+METHAMPHET UR QL: NEGATIVE
ANION GAP SERPL CALCULATED.3IONS-SCNC: 11 MMOL/L (ref 5–15)
BARBITURATES UR QL SCN: NEGATIVE
BASOPHILS # BLD AUTO: 0 10*3/MM3 (ref 0–0.2)
BASOPHILS NFR BLD AUTO: 0.3 % (ref 0–1.5)
BENZODIAZ UR QL SCN: NEGATIVE
BH CV ECHO MEAS - ACS: 1.7 CM
BH CV ECHO MEAS - AO MAX PG (FULL): 7.3 MMHG
BH CV ECHO MEAS - AO MAX PG: 12.2 MMHG
BH CV ECHO MEAS - AO MEAN PG (FULL): 3.4 MMHG
BH CV ECHO MEAS - AO MEAN PG: 6 MMHG
BH CV ECHO MEAS - AO ROOT AREA (BSA CORRECTED): 1.8
BH CV ECHO MEAS - AO ROOT AREA: 6.5 CM^2
BH CV ECHO MEAS - AO ROOT DIAM: 2.9 CM
BH CV ECHO MEAS - AO V2 MAX: 174.3 CM/SEC
BH CV ECHO MEAS - AO V2 MEAN: 116.7 CM/SEC
BH CV ECHO MEAS - AO V2 VTI: 31.2 CM
BH CV ECHO MEAS - ASC AORTA: 3.3 CM
BH CV ECHO MEAS - AVA(I,A): 1.8 CM^2
BH CV ECHO MEAS - AVA(I,D): 1.8 CM^2
BH CV ECHO MEAS - AVA(V,A): 1.6 CM^2
BH CV ECHO MEAS - AVA(V,D): 1.6 CM^2
BH CV ECHO MEAS - BSA(HAYCOCK): 1.6 M^2
BH CV ECHO MEAS - BSA: 1.6 M^2
BH CV ECHO MEAS - BZI_BMI: 25.8 KILOGRAMS/M^2
BH CV ECHO MEAS - BZI_METRIC_HEIGHT: 152.4 CM
BH CV ECHO MEAS - BZI_METRIC_WEIGHT: 59.9 KG
BH CV ECHO MEAS - EDV(CUBED): 54.3 ML
BH CV ECHO MEAS - EDV(MOD-SP4): 63.7 ML
BH CV ECHO MEAS - EDV(TEICH): 61.5 ML
BH CV ECHO MEAS - EF(CUBED): 56.2 %
BH CV ECHO MEAS - EF(MOD-BP): 63 %
BH CV ECHO MEAS - EF(MOD-SP4): 63.1 %
BH CV ECHO MEAS - EF(TEICH): 48.6 %
BH CV ECHO MEAS - ESV(CUBED): 23.8 ML
BH CV ECHO MEAS - ESV(MOD-SP4): 23.5 ML
BH CV ECHO MEAS - ESV(TEICH): 31.6 ML
BH CV ECHO MEAS - FS: 24.1 %
BH CV ECHO MEAS - IVS/LVPW: 1.5
BH CV ECHO MEAS - IVSD: 1.2 CM
BH CV ECHO MEAS - LA DIMENSION(2D): 3 CM
BH CV ECHO MEAS - LA DIMENSION: 3.3 CM
BH CV ECHO MEAS - LA/AO: 1.1
BH CV ECHO MEAS - LV DIASTOLIC VOL/BSA (35-75): 40.7 ML/M^2
BH CV ECHO MEAS - LV MASS(C)D: 114.9 GRAMS
BH CV ECHO MEAS - LV MASS(C)DI: 73.4 GRAMS/M^2
BH CV ECHO MEAS - LV MAX PG: 4.8 MMHG
BH CV ECHO MEAS - LV MEAN PG: 2.6 MMHG
BH CV ECHO MEAS - LV SYSTOLIC VOL/BSA (12-30): 15 ML/M^2
BH CV ECHO MEAS - LV V1 MAX: 109.9 CM/SEC
BH CV ECHO MEAS - LV V1 MEAN: 74.1 CM/SEC
BH CV ECHO MEAS - LV V1 VTI: 21.9 CM
BH CV ECHO MEAS - LVIDD: 3.8 CM
BH CV ECHO MEAS - LVIDS: 2.9 CM
BH CV ECHO MEAS - LVOT AREA: 2.5 CM^2
BH CV ECHO MEAS - LVOT DIAM: 1.8 CM
BH CV ECHO MEAS - LVPWD: 0.78 CM
BH CV ECHO MEAS - MV A MAX VEL: 125.2 CM/SEC
BH CV ECHO MEAS - MV DEC SLOPE: 387.3 CM/SEC^2
BH CV ECHO MEAS - MV DEC TIME: 0.25 SEC
BH CV ECHO MEAS - MV E MAX VEL: 95.5 CM/SEC
BH CV ECHO MEAS - MV E/A: 0.76
BH CV ECHO MEAS - MV MAX PG: 6 MMHG
BH CV ECHO MEAS - MV MEAN PG: 2.4 MMHG
BH CV ECHO MEAS - MV V2 MAX: 122.7 CM/SEC
BH CV ECHO MEAS - MV V2 MEAN: 71.2 CM/SEC
BH CV ECHO MEAS - MV V2 VTI: 35.5 CM
BH CV ECHO MEAS - MVA(VTI): 1.6 CM^2
BH CV ECHO MEAS - PA MAX PG (FULL): 0.64 MMHG
BH CV ECHO MEAS - PA MAX PG: 3.5 MMHG
BH CV ECHO MEAS - PA MEAN PG (FULL): 0.41 MMHG
BH CV ECHO MEAS - PA MEAN PG: 1.8 MMHG
BH CV ECHO MEAS - PA V2 MAX: 93 CM/SEC
BH CV ECHO MEAS - PA V2 MEAN: 64.4 CM/SEC
BH CV ECHO MEAS - PA V2 VTI: 18.7 CM
BH CV ECHO MEAS - PULM A REVS DUR: 0.1 SEC
BH CV ECHO MEAS - PULM A REVS VEL: 32 CM/SEC
BH CV ECHO MEAS - PULM DIAS VEL: 42 CM/SEC
BH CV ECHO MEAS - PULM S/D: 1.9
BH CV ECHO MEAS - PULM SYS VEL: 78.4 CM/SEC
BH CV ECHO MEAS - RV MAX PG: 2.8 MMHG
BH CV ECHO MEAS - RV MEAN PG: 1.4 MMHG
BH CV ECHO MEAS - RV V1 MAX: 84 CM/SEC
BH CV ECHO MEAS - RV V1 MEAN: 57 CM/SEC
BH CV ECHO MEAS - RV V1 VTI: 16 CM
BH CV ECHO MEAS - RVDD: 3.1 CM
BH CV ECHO MEAS - SI(AO): 130.2 ML/M^2
BH CV ECHO MEAS - SI(CUBED): 19.5 ML/M^2
BH CV ECHO MEAS - SI(LVOT): 35.6 ML/M^2
BH CV ECHO MEAS - SI(MOD-SP4): 25.7 ML/M^2
BH CV ECHO MEAS - SI(TEICH): 19.1 ML/M^2
BH CV ECHO MEAS - SV(AO): 203.6 ML
BH CV ECHO MEAS - SV(CUBED): 30.5 ML
BH CV ECHO MEAS - SV(LVOT): 55.7 ML
BH CV ECHO MEAS - SV(MOD-SP4): 40.2 ML
BH CV ECHO MEAS - SV(TEICH): 29.9 ML
BH CV ECHO MEAS - TR MAX VEL: 239 CM/SEC
BH CV LOWER VASCULAR LEFT COMMON FEMORAL AUGMENT: NORMAL
BH CV LOWER VASCULAR LEFT COMMON FEMORAL COMPETENT: NORMAL
BH CV LOWER VASCULAR LEFT COMMON FEMORAL COMPRESS: NORMAL
BH CV LOWER VASCULAR LEFT COMMON FEMORAL PHASIC: NORMAL
BH CV LOWER VASCULAR LEFT COMMON FEMORAL SPONT: NORMAL
BH CV LOWER VASCULAR LEFT DISTAL FEMORAL COMPRESS: NORMAL
BH CV LOWER VASCULAR LEFT GASTRONEMIUS COMPRESS: NORMAL
BH CV LOWER VASCULAR LEFT GREATER SAPH AK COMPRESS: NORMAL
BH CV LOWER VASCULAR LEFT GREATER SAPH BK COMPRESS: NORMAL
BH CV LOWER VASCULAR LEFT LESSER SAPH COMPRESS: NORMAL
BH CV LOWER VASCULAR LEFT MID FEMORAL AUGMENT: NORMAL
BH CV LOWER VASCULAR LEFT MID FEMORAL COMPETENT: NORMAL
BH CV LOWER VASCULAR LEFT MID FEMORAL COMPRESS: NORMAL
BH CV LOWER VASCULAR LEFT MID FEMORAL PHASIC: NORMAL
BH CV LOWER VASCULAR LEFT MID FEMORAL SPONT: NORMAL
BH CV LOWER VASCULAR LEFT PERONEAL COMPRESS: NORMAL
BH CV LOWER VASCULAR LEFT POPLITEAL AUGMENT: NORMAL
BH CV LOWER VASCULAR LEFT POPLITEAL COMPETENT: NORMAL
BH CV LOWER VASCULAR LEFT POPLITEAL COMPRESS: NORMAL
BH CV LOWER VASCULAR LEFT POPLITEAL PHASIC: NORMAL
BH CV LOWER VASCULAR LEFT POPLITEAL SPONT: NORMAL
BH CV LOWER VASCULAR LEFT POSTERIOR TIBIAL COMPRESS: NORMAL
BH CV LOWER VASCULAR LEFT PROXIMAL FEMORAL COMPRESS: NORMAL
BH CV LOWER VASCULAR LEFT SAPHENOFEMORAL JUNCTION COMPRESS: NORMAL
BH CV LOWER VASCULAR RIGHT COMMON FEMORAL AUGMENT: NORMAL
BH CV LOWER VASCULAR RIGHT COMMON FEMORAL COMPETENT: NORMAL
BH CV LOWER VASCULAR RIGHT COMMON FEMORAL COMPRESS: NORMAL
BH CV LOWER VASCULAR RIGHT COMMON FEMORAL PHASIC: NORMAL
BH CV LOWER VASCULAR RIGHT COMMON FEMORAL SPONT: NORMAL
BH CV LOWER VASCULAR RIGHT DISTAL FEMORAL COMPRESS: NORMAL
BH CV LOWER VASCULAR RIGHT GASTRONEMIUS COMPRESS: NORMAL
BH CV LOWER VASCULAR RIGHT GREATER SAPH AK COMPRESS: NORMAL
BH CV LOWER VASCULAR RIGHT GREATER SAPH BK COMPRESS: NORMAL
BH CV LOWER VASCULAR RIGHT LESSER SAPH COMPRESS: NORMAL
BH CV LOWER VASCULAR RIGHT MID FEMORAL AUGMENT: NORMAL
BH CV LOWER VASCULAR RIGHT MID FEMORAL COMPETENT: NORMAL
BH CV LOWER VASCULAR RIGHT MID FEMORAL COMPRESS: NORMAL
BH CV LOWER VASCULAR RIGHT MID FEMORAL PHASIC: NORMAL
BH CV LOWER VASCULAR RIGHT MID FEMORAL SPONT: NORMAL
BH CV LOWER VASCULAR RIGHT PERONEAL COMPRESS: NORMAL
BH CV LOWER VASCULAR RIGHT POPLITEAL AUGMENT: NORMAL
BH CV LOWER VASCULAR RIGHT POPLITEAL COMPETENT: NORMAL
BH CV LOWER VASCULAR RIGHT POPLITEAL COMPRESS: NORMAL
BH CV LOWER VASCULAR RIGHT POPLITEAL PHASIC: NORMAL
BH CV LOWER VASCULAR RIGHT POPLITEAL SPONT: NORMAL
BH CV LOWER VASCULAR RIGHT POSTERIOR TIBIAL COMPRESS: NORMAL
BH CV LOWER VASCULAR RIGHT PROXIMAL FEMORAL COMPRESS: NORMAL
BH CV LOWER VASCULAR RIGHT SAPHENOFEMORAL JUNCTION COMPRESS: NORMAL
BH CV NUCLEAR PRIOR STUDY: 3
BH CV REST NUCLEAR ISOTOPE DOSE: 7.2 MCI
BH CV STRESS BP STAGE 1: NORMAL
BH CV STRESS BP STAGE 2: NORMAL
BH CV STRESS COMMENTS STAGE 1: NORMAL
BH CV STRESS COMMENTS STAGE 2: NORMAL
BH CV STRESS DOSE REGADENOSON STAGE 1: 0.4
BH CV STRESS DURATION MIN STAGE 1: 0
BH CV STRESS DURATION MIN STAGE 2: 4
BH CV STRESS DURATION SEC STAGE 1: 10
BH CV STRESS DURATION SEC STAGE 2: 0
BH CV STRESS HR STAGE 1: 75
BH CV STRESS HR STAGE 2: 91
BH CV STRESS NUCLEAR ISOTOPE DOSE: 21.8 MCI
BH CV STRESS PROTOCOL 1: NORMAL
BH CV STRESS RECOVERY BP: NORMAL MMHG
BH CV STRESS RECOVERY HR: 83 BPM
BH CV STRESS STAGE 1: 1
BH CV STRESS STAGE 2: 2
BILIRUB CONJ SERPL-MCNC: 0.2 MG/DL (ref 0–0.3)
BILIRUB INDIRECT SERPL-MCNC: 0.3 MG/DL
BILIRUB SERPL-MCNC: 0.5 MG/DL (ref 0–1.2)
BILIRUB UR QL STRIP: NEGATIVE
BUN SERPL-MCNC: 15 MG/DL (ref 8–23)
BUN/CREAT SERPL: 16.1 (ref 7–25)
CALCIUM SPEC-SCNC: 7.8 MG/DL (ref 8.6–10.5)
CANNABINOIDS SERPL QL: NEGATIVE
CHLORIDE SERPL-SCNC: 107 MMOL/L (ref 98–107)
CLARITY UR: CLEAR
CO2 SERPL-SCNC: 21 MMOL/L (ref 22–29)
COCAINE UR QL: NEGATIVE
COLOR UR: YELLOW
CREAT SERPL-MCNC: 0.93 MG/DL (ref 0.57–1)
D DIMER PPP FEU-MCNC: 1.1 MG/L (FEU) (ref 0–0.59)
DAT POLY-SP REAG RBC QL: NEGATIVE
DEPRECATED RDW RBC AUTO: 60.4 FL (ref 37–54)
EOSINOPHIL # BLD AUTO: 0 10*3/MM3 (ref 0–0.4)
EOSINOPHIL NFR BLD AUTO: 0 % (ref 0.3–6.2)
ERYTHROCYTE [DISTWIDTH] IN BLOOD BY AUTOMATED COUNT: 27.1 % (ref 12.3–15.4)
FERRITIN SERPL-MCNC: 3.51 NG/ML (ref 13–150)
FOLATE SERPL-MCNC: 11.8 NG/ML (ref 4.78–24.2)
GFR SERPL CREATININE-BSD FRML MDRD: 61 ML/MIN/1.73
GLUCOSE SERPL-MCNC: 156 MG/DL (ref 65–99)
GLUCOSE UR STRIP-MCNC: NEGATIVE MG/DL
HAPTOGLOB SERPL-MCNC: 205 MG/DL (ref 30–200)
HCT VFR BLD AUTO: 24.3 % (ref 34–46.6)
HCT VFR BLD AUTO: 25.1 % (ref 34–46.6)
HCT VFR BLD AUTO: 25.7 % (ref 34–46.6)
HGB BLD-MCNC: 7.6 G/DL (ref 12–15.9)
HGB BLD-MCNC: 7.6 G/DL (ref 12–15.9)
HGB BLD-MCNC: 7.8 G/DL (ref 12–15.9)
HGB UR QL STRIP.AUTO: NEGATIVE
IRON 24H UR-MRATE: 14 MCG/DL (ref 37–145)
IRON SATN MFR SERPL: 3 % (ref 20–50)
KETONES UR QL STRIP: NEGATIVE
LDH SERPL-CCNC: 215 U/L (ref 135–214)
LEUKOCYTE ESTERASE UR QL STRIP.AUTO: NEGATIVE
LV EF 2D ECHO EST: 60 %
LYMPHOCYTES # BLD AUTO: 0.6 10*3/MM3 (ref 0.7–3.1)
LYMPHOCYTES NFR BLD AUTO: 14.9 % (ref 19.6–45.3)
MAGNESIUM SERPL-MCNC: 1.9 MG/DL (ref 1.6–2.4)
MAXIMAL PREDICTED HEART RATE: 155 BPM
MAXIMAL PREDICTED HEART RATE: 155 BPM
MCH RBC QN AUTO: 19.9 PG (ref 26.6–33)
MCHC RBC AUTO-ENTMCNC: 30.1 G/DL (ref 31.5–35.7)
MCV RBC AUTO: 66.1 FL (ref 79–97)
METHADONE UR QL SCN: NEGATIVE
MONOCYTES # BLD AUTO: 0.2 10*3/MM3 (ref 0.1–0.9)
MONOCYTES NFR BLD AUTO: 4 % (ref 5–12)
NEUTROPHILS NFR BLD AUTO: 3.2 10*3/MM3 (ref 1.7–7)
NEUTROPHILS NFR BLD AUTO: 80.8 % (ref 42.7–76)
NITRITE UR QL STRIP: NEGATIVE
NRBC BLD AUTO-RTO: 0.1 /100 WBC (ref 0–0.2)
OPIATES UR QL: NEGATIVE
OXYCODONE UR QL SCN: NEGATIVE
PERCENT MAX PREDICTED HR: 58.71 %
PH UR STRIP.AUTO: 6 [PH] (ref 5–8)
PLATELET # BLD AUTO: 443 10*3/MM3 (ref 140–450)
PMV BLD AUTO: 7.5 FL (ref 6–12)
POTASSIUM SERPL-SCNC: 4.3 MMOL/L (ref 3.5–5.2)
PROT UR QL STRIP: NEGATIVE
RBC # BLD AUTO: 3.8 10*6/MM3 (ref 3.77–5.28)
RETICS # AUTO: 0.05 10*6/MM3 (ref 0.02–0.13)
RETICS/RBC NFR AUTO: 1.38 % (ref 0.7–1.9)
SODIUM SERPL-SCNC: 139 MMOL/L (ref 136–145)
SP GR UR STRIP: 1.05 (ref 1–1.03)
STRESS BASELINE BP: NORMAL MMHG
STRESS BASELINE HR: 75 BPM
STRESS PERCENT HR: 69 %
STRESS POST PEAK BP: NORMAL MMHG
STRESS POST PEAK HR: 91 BPM
STRESS TARGET HR: 132 BPM
STRESS TARGET HR: 132 BPM
TIBC SERPL-MCNC: 435 MCG/DL (ref 298–536)
TRANSFERRIN SERPL-MCNC: 292 MG/DL (ref 200–360)
TROPONIN T SERPL-MCNC: 0.5 NG/ML (ref 0–0.03)
UROBILINOGEN UR QL STRIP: ABNORMAL
VIT B12 BLD-MCNC: 320 PG/ML (ref 211–946)
WBC # BLD AUTO: 3.9 10*3/MM3 (ref 3.4–10.8)

## 2021-11-11 PROCEDURE — 85018 HEMOGLOBIN: CPT | Performed by: NURSE PRACTITIONER

## 2021-11-11 PROCEDURE — 80307 DRUG TEST PRSMV CHEM ANLYZR: CPT | Performed by: NURSE PRACTITIONER

## 2021-11-11 PROCEDURE — 99221 1ST HOSP IP/OBS SF/LOW 40: CPT | Performed by: SURGERY

## 2021-11-11 PROCEDURE — 84446 ASSAY OF VITAMIN E: CPT | Performed by: SURGERY

## 2021-11-11 PROCEDURE — 85014 HEMATOCRIT: CPT | Performed by: INTERNAL MEDICINE

## 2021-11-11 PROCEDURE — 85025 COMPLETE CBC W/AUTO DIFF WBC: CPT | Performed by: INTERNAL MEDICINE

## 2021-11-11 PROCEDURE — 93010 ELECTROCARDIOGRAM REPORT: CPT | Performed by: INTERNAL MEDICINE

## 2021-11-11 PROCEDURE — 82746 ASSAY OF FOLIC ACID SERUM: CPT | Performed by: INTERNAL MEDICINE

## 2021-11-11 PROCEDURE — 25010000002 REGADENOSON 0.4 MG/5ML SOLUTION: Performed by: INTERNAL MEDICINE

## 2021-11-11 PROCEDURE — 78452 HT MUSCLE IMAGE SPECT MULT: CPT | Performed by: INTERNAL MEDICINE

## 2021-11-11 PROCEDURE — 85045 AUTOMATED RETICULOCYTE COUNT: CPT | Performed by: INTERNAL MEDICINE

## 2021-11-11 PROCEDURE — 93018 CV STRESS TEST I&R ONLY: CPT | Performed by: INTERNAL MEDICINE

## 2021-11-11 PROCEDURE — 84165 PROTEIN E-PHORESIS SERUM: CPT | Performed by: INTERNAL MEDICINE

## 2021-11-11 PROCEDURE — 99233 SBSQ HOSP IP/OBS HIGH 50: CPT | Performed by: INTERNAL MEDICINE

## 2021-11-11 PROCEDURE — 83735 ASSAY OF MAGNESIUM: CPT | Performed by: NURSE PRACTITIONER

## 2021-11-11 PROCEDURE — 25010000002 ONDANSETRON PER 1 MG: Performed by: NURSE PRACTITIONER

## 2021-11-11 PROCEDURE — 93017 CV STRESS TEST TRACING ONLY: CPT

## 2021-11-11 PROCEDURE — 86880 COOMBS TEST DIRECT: CPT | Performed by: INTERNAL MEDICINE

## 2021-11-11 PROCEDURE — 84597 ASSAY OF VITAMIN K: CPT | Performed by: SURGERY

## 2021-11-11 PROCEDURE — 83921 ORGANIC ACID SINGLE QUANT: CPT | Performed by: INTERNAL MEDICINE

## 2021-11-11 PROCEDURE — 25010000002 NA FERRIC GLUC CPLX PER 12.5 MG: Performed by: INTERNAL MEDICINE

## 2021-11-11 PROCEDURE — 81003 URINALYSIS AUTO W/O SCOPE: CPT | Performed by: NURSE PRACTITIONER

## 2021-11-11 PROCEDURE — 78452 HT MUSCLE IMAGE SPECT MULT: CPT

## 2021-11-11 PROCEDURE — 82747 ASSAY OF FOLIC ACID RBC: CPT | Performed by: INTERNAL MEDICINE

## 2021-11-11 PROCEDURE — 0 TECHNETIUM SESTAMIBI: Performed by: INTERNAL MEDICINE

## 2021-11-11 PROCEDURE — 82247 BILIRUBIN TOTAL: CPT | Performed by: INTERNAL MEDICINE

## 2021-11-11 PROCEDURE — 82784 ASSAY IGA/IGD/IGG/IGM EACH: CPT | Performed by: INTERNAL MEDICINE

## 2021-11-11 PROCEDURE — 82607 VITAMIN B-12: CPT | Performed by: INTERNAL MEDICINE

## 2021-11-11 PROCEDURE — 93306 TTE W/DOPPLER COMPLETE: CPT

## 2021-11-11 PROCEDURE — 84155 ASSAY OF PROTEIN SERUM: CPT | Performed by: INTERNAL MEDICINE

## 2021-11-11 PROCEDURE — 99221 1ST HOSP IP/OBS SF/LOW 40: CPT | Performed by: NURSE PRACTITIONER

## 2021-11-11 PROCEDURE — 84252 ASSAY OF VITAMIN B-2: CPT | Performed by: SURGERY

## 2021-11-11 PROCEDURE — 0 LIDOCAINE 1 % SOLUTION: Performed by: SURGERY

## 2021-11-11 PROCEDURE — 84591 ASSAY OF NOS VITAMIN: CPT | Performed by: SURGERY

## 2021-11-11 PROCEDURE — 85018 HEMOGLOBIN: CPT | Performed by: INTERNAL MEDICINE

## 2021-11-11 PROCEDURE — 85014 HEMATOCRIT: CPT | Performed by: NURSE PRACTITIONER

## 2021-11-11 PROCEDURE — 84484 ASSAY OF TROPONIN QUANT: CPT | Performed by: NURSE PRACTITIONER

## 2021-11-11 PROCEDURE — 86334 IMMUNOFIX E-PHORESIS SERUM: CPT | Performed by: INTERNAL MEDICINE

## 2021-11-11 PROCEDURE — A9500 TC99M SESTAMIBI: HCPCS | Performed by: INTERNAL MEDICINE

## 2021-11-11 PROCEDURE — 84590 ASSAY OF VITAMIN A: CPT | Performed by: SURGERY

## 2021-11-11 PROCEDURE — 99221 1ST HOSP IP/OBS SF/LOW 40: CPT | Performed by: INTERNAL MEDICINE

## 2021-11-11 PROCEDURE — 80048 BASIC METABOLIC PNL TOTAL CA: CPT | Performed by: INTERNAL MEDICINE

## 2021-11-11 PROCEDURE — 0 MAGNESIUM SULFATE 4 GM/100ML SOLUTION: Performed by: NURSE PRACTITIONER

## 2021-11-11 PROCEDURE — 83010 ASSAY OF HAPTOGLOBIN QUANT: CPT | Performed by: INTERNAL MEDICINE

## 2021-11-11 PROCEDURE — 83615 LACTATE (LD) (LDH) ENZYME: CPT | Performed by: INTERNAL MEDICINE

## 2021-11-11 PROCEDURE — 83921 ORGANIC ACID SINGLE QUANT: CPT | Performed by: SURGERY

## 2021-11-11 PROCEDURE — 84425 ASSAY OF VITAMIN B-1: CPT | Performed by: SURGERY

## 2021-11-11 PROCEDURE — 99222 1ST HOSP IP/OBS MODERATE 55: CPT | Performed by: INTERNAL MEDICINE

## 2021-11-11 PROCEDURE — 82248 BILIRUBIN DIRECT: CPT | Performed by: INTERNAL MEDICINE

## 2021-11-11 PROCEDURE — 93970 EXTREMITY STUDY: CPT

## 2021-11-11 PROCEDURE — 93306 TTE W/DOPPLER COMPLETE: CPT | Performed by: INTERNAL MEDICINE

## 2021-11-11 RX ORDER — LIDOCAINE HYDROCHLORIDE 10 MG/ML
20 INJECTION, SOLUTION INFILTRATION; PERINEURAL ONCE
Status: COMPLETED | OUTPATIENT
Start: 2021-11-11 | End: 2021-11-11

## 2021-11-11 RX ADMIN — PANTOPRAZOLE SODIUM 40 MG: 40 INJECTION, POWDER, FOR SOLUTION INTRAVENOUS at 20:04

## 2021-11-11 RX ADMIN — LIDOCAINE HYDROCHLORIDE 20 ML: 10 INJECTION, SOLUTION INFILTRATION; PERINEURAL at 11:39

## 2021-11-11 RX ADMIN — SODIUM CHLORIDE, PRESERVATIVE FREE 10 ML: 5 INJECTION INTRAVENOUS at 08:17

## 2021-11-11 RX ADMIN — TECHNETIUM TC 99M SESTAMIBI 1 DOSE: 1 INJECTION INTRAVENOUS at 12:51

## 2021-11-11 RX ADMIN — PANTOPRAZOLE SODIUM 40 MG: 40 INJECTION, POWDER, FOR SOLUTION INTRAVENOUS at 00:30

## 2021-11-11 RX ADMIN — PANTOPRAZOLE SODIUM 40 MG: 40 INJECTION, POWDER, FOR SOLUTION INTRAVENOUS at 08:19

## 2021-11-11 RX ADMIN — TECHNETIUM TC 99M SESTAMIBI 1 DOSE: 1 INJECTION INTRAVENOUS at 13:38

## 2021-11-11 RX ADMIN — MAGNESIUM SULFATE HEPTAHYDRATE 4 G: 4 INJECTION, SOLUTION INTRAVENOUS at 20:04

## 2021-11-11 RX ADMIN — ONDANSETRON 4 MG: 2 INJECTION INTRAMUSCULAR; INTRAVENOUS at 20:03

## 2021-11-11 RX ADMIN — SODIUM CHLORIDE, PRESERVATIVE FREE 10 ML: 5 INJECTION INTRAVENOUS at 20:04

## 2021-11-11 RX ADMIN — REGADENOSON 0.4 MG: 0.08 INJECTION, SOLUTION INTRAVENOUS at 13:38

## 2021-11-11 RX ADMIN — SODIUM CHLORIDE 250 MG: 9 INJECTION, SOLUTION INTRAVENOUS at 15:40

## 2021-11-11 NOTE — H&P
[facility] Medicine Services      Patient Name: Duyen Odom  : 1955  MRN: [id number]  Primary Care Physician:  Ellen Red, APRN  Date of admission: 11/10/2021      Subjective      Chief Complaint: Dyspnea    History of Present Illness:  Duyen Odom is a 66 y.o. female w/PMH of HLD, HTN who presents to [facility] ED due to dyspnea.  Patient states dyspnea has progressively worsened over the last 2 weeks, dyspnea is worse with exertion, no aggravating or alleviating factors noted.  Patient admits to cough, fever, chills, fatigue, BLE edema.  Patient denies tobacco use, illegal drug use, alcohol use, nausea, vomiting, melena.  As dyspnea did not improve she decided to go to [facility] ED.      Upon arrival to the ED vitals temp 99.4, , RR 16, /74, O2 sat 100% on room air.  Labs notable for troponin 0 0.899, proBNP 1187, glucose 96, , K4.3, CO2 24, creatinine 1.22, BUN 17, GFR 44, WBC 6.4, Hgb 6.2, platelets 484, neutrophils 67.5.  PCR panel negative.  Covid test negative.  EKG shows sinus rhythm rate 79, low voltage extremity and precordial leads.  Chest x-ray shows no acute chest findings.  Patient treated in the ED with IV Benadryl, 125 mg of IV Solu-Medrol, 1000 mL NS bolus. Chart review shows patient presented to PCP yesterday with complaints of loss of taste, loss of smell with prior Covid diagnosis in 2021, cough, fever, sore throat with positive Covid contact 3 weeks prior, symptoms present for 9 days.  Patient has not been vaccinated.      Review of Systems   Constitutional: Positive for chills, fever and malaise/fatigue.   HENT: Negative.    Eyes: Negative.    Cardiovascular: Positive for dyspnea on exertion and leg swelling.   Respiratory: Positive for cough and shortness of breath.    Endocrine: Negative.    Hematologic/Lymphatic: Negative.    Skin: Negative.    Musculoskeletal: Negative.  Negative for falls.    Gastrointestinal: Negative.  Negative for bloating, abdominal pain, nausea and vomiting.   Genitourinary: Negative.  Negative for dysuria and hematuria.   Neurological: Positive for weakness.   Psychiatric/Behavioral: Negative.    Allergic/Immunologic: Negative.    All other systems reviewed and are negative.       Personal History     Past Medical History:   Diagnosis Date   • Anemia    • Chest tightness    • Elevated troponin    • High platelet count    • Hyperlipidemia    • Hypertension    • Positive D-dimer        Past Surgical History:   Procedure Laterality Date   • ABDOMINAL SURGERY      lap band   •  SECTION     • HYSTERECTOMY     • LAPAROSCOPIC GASTRIC BANDING         Family History: family history includes Diabetes in her father; Heart disease in her father; Hypertension in her mother; Mental illness in her mother. Otherwise pertinent FHx was reviewed and not pertinent to current issue.    Social History:  reports that she has never smoked. She has never used smokeless tobacco. She reports current alcohol use. She reports that she does not use drugs.    Home Medications:  Prior to Admission Medications     Prescriptions Last Dose Informant Patient Reported? Taking?    azithromycin (Zithromax Z-Aneesh) 250 MG tablet 11/10/2021  No Yes    Take 2 tablets by mouth on day 1, then 1 tablet daily on days 2-5    benzonatate (Tessalon Perles) 100 MG capsule 11/10/2021  No Yes    Take 1 capsule by mouth 3 (Three) Times a Day As Needed for Cough.    predniSONE (DELTASONE) 10 MG (48) dose pack 11/10/2021  No Yes    Take as directed on pack            Allergies:  Allergies   Allergen Reactions   • Iodinated Diagnostic Agents Hives     HIVES ALL OVER CHEST DURING IVP       Objective      Vitals:   Temp:  [98.1 °F (36.7 °C)-99.4 °F (37.4 °C)] 98.1 °F (36.7 °C)  Heart Rate:  [] 102  Resp:  [15-20] 17  BP: (112-150)/(56-91) 150/91    Physical Exam  Vitals and nursing note reviewed.   Constitutional:        Appearance: She is ill-appearing.   HENT:      Head: Normocephalic and atraumatic.      Right Ear: External ear normal.      Left Ear: External ear normal.      Nose: Nose normal.      Mouth/Throat:      Mouth: Mucous membranes are dry.   Eyes:      Pupils: Pupils are equal, round, and reactive to light.   Cardiovascular:      Rate and Rhythm: Normal rate and regular rhythm.      Pulses: Normal pulses.      Heart sounds: Normal heart sounds.   Pulmonary:      Effort: Pulmonary effort is normal.      Breath sounds: Examination of the right-upper field reveals wheezing and rales. Examination of the left-upper field reveals wheezing and rales. Examination of the right-middle field reveals wheezing and rales. Examination of the left-middle field reveals wheezing and rales. Wheezing and rales present.   Abdominal:      General: Bowel sounds are increased. There is distension.      Tenderness: There is abdominal tenderness in the epigastric area.   Musculoskeletal:      Cervical back: Normal range of motion.      Right lower le+ Edema present.      Left lower le+ Edema present.   Skin:     General: Skin is warm and dry.   Neurological:      General: No focal deficit present.      Mental Status: She is alert and oriented to person, place, and time. Mental status is at baseline.      Cranial Nerves: No facial asymmetry.      Motor: No weakness.   Psychiatric:         Attention and Perception: Attention and perception normal.         Mood and Affect: Affect is flat.         Speech: Speech normal.         Behavior: Behavior normal. Behavior is cooperative.         Thought Content: Thought content normal.         Cognition and Memory: Cognition and memory normal.         Judgment: Judgment normal.          Result Review    Result Review:  I have personally reviewed the results from the time of this admission to 11/10/2021 23:28 EST and agree with these findings:  [x]  Laboratory  [x]  Microbiology  [x]  Radiology  [x]   EKG/Telemetry   [x]  Cardiology/Vascular   []  Pathology  []  Old records  []  Other:        Assessment/Plan        Active Hospital Problems:  Active Hospital Problems    Diagnosis    • **Symptomatic anemia    • Dyspnea    • Elevated troponin    • Elevated d-dimer    • History of COVID-19      Symptomatic anemia:  -Upon arrival to the ED hemoglobin 6.2  -1 unit packed red blood cells ordered  -Trend H&H every 6 hours  -Check PTT/PT/INR, UA, toxicology screen  -SCDs for VTE prophylaxis   -Fall precautions    Dyspnea:  -CT PE protocol showed no acute pulmonary embolic disease. The esophagus is markedly dilated and fluid-filled to the level of the thoracic inlet. The patient has had a previous gastric lap band procedure. This suggests that the gastric lap band may have too much tension obstructing the gastroesophageal junction   -Consult bariatric surgery for evaluation of CT films and management of a possible GE junction obstruction   -Stat CT abdomen pelvis  -Elevate head of bed 45 degrees continuously  -Continuous pulse oximetry  -N.p.o. until cleared by surgery     Elevated troponin:  -Upon arrival to the ED troponin 0.899  -Consult cardiology for evaluation  -Trend EKG, troponin levels  -Continuous cardiac monitoring    Elevated D-dimer:  -D-dimer on 11/9/2021 1.21, history of COVID-19 August 2021  -CT PE protocol negative for PE  -Recheck D-dimer stat  -BLE venous Doppler studies in a.m.  -Fall precautions    History of COVID-19:  -August 2021        DVT prophylaxis:  Mechanical DVT prophylaxis orders are present.      Home medications verified by nursing and/or pharmacy prior to provider review      CODE STATUS:    Level Of Support Discussed With: Patient  Code Status (Patient has no pulse and is not breathing): CPR (Attempt to Resuscitate)  Medical Interventions (Patient has pulse or is breathing): Full Support    Admission Status:  I believe this patient meets observation status.      I discussed the patient's  findings and my recommendations with patient.      The patient was interviewed wearing appropriate personal protective equipment.      Signature: Electronically signed by OTILIO Nix, 11/10/21, 11:32 PM EST.

## 2021-11-11 NOTE — CONSULTS
Hematology/Oncology Inpatient Consultation    Patient name: Duyen Odom  : 1955  MRN: [id number]  Referring Provider: Dr.Jalaram Whitfield  Reason for Consultation: symptomatic anemia    Chief complaint: dyspnea    History of present illness:    Duyen Odom is a 66 y.o. female who presented to [facility] on 11/10/2021 with complaints of shortness of breath that has progressively gotten worse over the last 2 weeks. Dyspnea is accompanied with fever, cough, chest tightness and BLE. Patient was called by PCP to go to ED after receiving elevated d-dimer that was drawn in the office.  Patient was seen in PCP office yesterday for cough, weakness, shortness of breath, some chest discomfort and leg swelling. Patient was started Z-jaja and prednisone with Robintussin for cough.  Patient has been afebrile for 48 hours.  Patient has history of pulmonary embolism years ago after kidney stone surgery, was started on anticoagulation temporarily afterwards.  Denies black tarry stools, hematochezia or hematemesis.      Upon arrival to the ED vitals temp 99.4, , RR 16, /74, O2 sat 100% on room air.  Labs notable for troponin 0 0.899, proBNP 1187, glucose 96, , K4.3, CO2 24, creatinine 1.22, BUN 17, GFR 44, WBC 6.4, Hgb 6.2, platelets 484, neutrophils 67.5.  PCR panel negative.  Covid test negative.  EKG shows sinus rhythm rate 79, low voltage extremity and precordial leads.  Chest x-ray shows no acute chest findings.  Patient treated in the ED with IV Benadryl, 125 mg of IV Solu-Medrol, 1000 mL NS bolus. Chart review shows patient presented to PCP yesterday with complaints of loss of taste, loss of smell with prior Covid diagnosis in 2021, cough, fever, sore throat with positive Covid contact 3 weeks prior, symptoms present for 9 days.  Patient has not been vaccinated    21  Hematology/Oncology was consulted for symptomatic anemia.  Hemoglobin 6.2, received 1 unit of  PRBC's. CT PE protocol showed no acute PE.  The esophagus is markedly dilated and fluid filled to the level of thoracic inlet.  Patient patient had a previous gastric lap band procedure.  This suggest that the gastric lap band may have too much tension obstructing the gastroesophageal junction.   consulted for evaluation and management of possible GE junction obstruction.  Denies history of malignancy or ever having EGD/colonoscopy.  Denies fever, chills, chest pain, abdominal pain, nausea, vomiting, diarrhea, dysuria, hematuria, melena, hematochezia or hematemesis.  Past history of PE several years ago.      He/She  has a past medical history of Anemia, Chest tightness, Elevated troponin, High platelet count, Hyperlipidemia, Hypertension, and Positive D-dimer.    PCP: Ellen Red APRN    History:  Past Medical History:   Diagnosis Date   • Anemia    • Chest tightness    • Elevated troponin    • High platelet count    • Hyperlipidemia    • Hypertension    • Positive D-dimer    ,   Past Surgical History:   Procedure Laterality Date   • ABDOMINAL SURGERY      lap band   •  SECTION     • HYSTERECTOMY     • LAPAROSCOPIC GASTRIC BANDING     ,   Family History   Problem Relation Age of Onset   • Mental illness Mother    • Hypertension Mother    • Diabetes Father    • Heart disease Father    ,   Social History     Tobacco Use   • Smoking status: Never Smoker   • Smokeless tobacco: Never Used   Vaping Use   • Vaping Use: Never used   Substance Use Topics   • Alcohol use: Yes     Comment: occ.   • Drug use: Never   ,   Medications Prior to Admission   Medication Sig Dispense Refill Last Dose   • azithromycin (Zithromax Z-Aneesh) 250 MG tablet Take 2 tablets by mouth on day 1, then 1 tablet daily on days 2-5 6 tablet 0 11/10/2021 at Unknown time   • benzonatate (Tessalon Perles) 100 MG capsule Take 1 capsule by mouth 3 (Three) Times a Day As Needed for Cough. 30 capsule 1 11/10/2021 at Unknown time   •  predniSONE (DELTASONE) 10 MG (48) dose pack Take as directed on pack 1 each 0 11/10/2021 at Unknown time   , Scheduled Meds:  lidocaine, 20 mL, Subcutaneous, Once  pantoprazole, 40 mg, Intravenous, Q12H  sodium chloride, 10 mL, Intravenous, Q12H    , Continuous Infusions:   , PRN Meds:  •  acetaminophen **OR** acetaminophen **OR** acetaminophen  •  Calcium Gluconate-NaCl **AND** calcium gluconate **AND** Calcium, Ionized  •  magnesium sulfate **OR** magnesium sulfate **OR** magnesium sulfate  •  nitroglycerin  •  ondansetron **OR** ondansetron  •  [COMPLETED] Insert peripheral IV **AND** sodium chloride  •  sodium chloride   Allergies:  Iodinated diagnostic agents    Subjective     ROS:  Review of Systems   Constitutional: Negative for activity change, appetite change, chills, diaphoresis, fatigue, fever and unexpected weight change.   HENT: Negative for congestion, dental problem, ear discharge, ear pain, facial swelling, hearing loss, mouth sores, nosebleeds, sore throat, tinnitus, trouble swallowing and voice change.    Eyes: Negative for photophobia and visual disturbance.   Respiratory: Negative for cough, choking, chest tightness, shortness of breath and wheezing.    Cardiovascular: Negative for chest pain, palpitations and leg swelling.   Gastrointestinal: Negative for abdominal distention, abdominal pain, blood in stool, constipation, diarrhea, nausea and vomiting.   Endocrine: Negative.    Genitourinary: Negative for decreased urine volume, difficulty urinating, dysuria, flank pain, frequency, hematuria and urgency.   Musculoskeletal: Negative for back pain, gait problem, joint swelling, myalgias, neck pain and neck stiffness.   Skin: Negative for color change, rash and wound.   Neurological: Negative for dizziness, tremors, syncope, speech difficulty, weakness, numbness and headaches.   Hematological: Negative.    Psychiatric/Behavioral: Negative.         Objective   Vital Signs:   /70   Pulse 81    "Temp 97.9 °F (36.6 °C) (Oral)   Resp 16   Ht 152.4 cm (60\")   Wt 60.1 kg (132 lb 7.9 oz)   SpO2 97%   BMI 25.88 kg/m²     Physical Exam: (performed by MD)  Physical Exam  Vitals and nursing note reviewed. Exam conducted with a chaperone present.   Constitutional:       General: She is not in acute distress.     Appearance: Normal appearance. She is normal weight. She is not ill-appearing, toxic-appearing or diaphoretic.   HENT:      Head: Normocephalic and atraumatic.      Right Ear: Tympanic membrane, ear canal and external ear normal.      Left Ear: Tympanic membrane, ear canal and external ear normal.      Nose: Nose normal. No congestion or rhinorrhea.      Mouth/Throat:      Mouth: Mucous membranes are moist.      Pharynx: Oropharynx is clear.   Eyes:      General:         Right eye: No discharge.         Left eye: No discharge.      Extraocular Movements: Extraocular movements intact.      Conjunctiva/sclera: Conjunctivae normal.      Pupils: Pupils are equal, round, and reactive to light.   Neck:      Vascular: No carotid bruit.   Cardiovascular:      Rate and Rhythm: Normal rate and regular rhythm.      Pulses: Normal pulses.      Heart sounds: Normal heart sounds. No murmur heard.  No friction rub. No gallop.    Pulmonary:      Effort: Pulmonary effort is normal. No respiratory distress.      Breath sounds: Normal breath sounds. No stridor. No wheezing, rhonchi or rales.   Chest:      Chest wall: No tenderness.   Abdominal:      General: Abdomen is flat. Bowel sounds are normal. There is no distension.      Palpations: Abdomen is soft. There is no mass.      Tenderness: There is abdominal tenderness. There is no guarding or rebound.      Hernia: No hernia is present.   Genitourinary:     Rectum: Normal.   Musculoskeletal:         General: No swelling, tenderness or signs of injury. Normal range of motion.      Cervical back: Normal range of motion and neck supple. No rigidity or tenderness.      Right " lower leg: Edema present.      Left lower leg: Edema present.   Lymphadenopathy:      Cervical: No cervical adenopathy.   Skin:     General: Skin is warm and dry.      Capillary Refill: Capillary refill takes less than 2 seconds.      Coloration: Skin is not jaundiced.      Findings: No erythema or rash.   Neurological:      General: No focal deficit present.      Mental Status: She is alert and oriented to person, place, and time. Mental status is at baseline.      Cranial Nerves: No cranial nerve deficit.      Sensory: No sensory deficit.      Motor: No weakness.      Coordination: Coordination normal.      Gait: Gait normal.      Deep Tendon Reflexes: Reflexes normal.   Psychiatric:         Mood and Affect: Mood normal.         Behavior: Behavior normal.         Thought Content: Thought content normal.         Judgment: Judgment normal.         Results Review:  Lab Results (last 48 hours)       Procedure Component Value Units Date/Time    CBC & Differential [[id number]] Collected: 11/11/21 0921    Specimen: Blood Updated: 11/11/21 1018    Narrative:      The following orders were created for panel order CBC & Differential.  Procedure                               Abnormality         Status                     ---------                               -----------         ------                     CBC Auto Differential[[id number]]                            In process                 Scan Slide[[id number]]                                       In process                   Please view results for these tests on the individual orders.    Scan Slide [[id number]] Collected: 11/11/21 0921    Specimen: Blood Updated: 11/11/21 1018    CBC Auto Differential [[id number]] Collected: 11/11/21 0921    Specimen: Blood Updated: 11/11/21 1010    Ferritin [[id number]]  (Abnormal) Collected: 11/10/21 1952    Specimen: Blood Updated: 11/11/21 0812     Ferritin 3.51 ng/mL     Narrative:      Results may be falsely decreased if patient  taking Biotin.      Iron Profile [[id number]]  (Abnormal) Collected: 11/10/21 1952    Specimen: Blood Updated: 11/11/21 0807     Iron 14 mcg/dL      Iron Saturation 3 %      Transferrin 292 mg/dL      TIBC 435 mcg/dL     Troponin [[id number]]  (Abnormal) Collected: 11/11/21 0116    Specimen: Blood Updated: 11/11/21 0207     Troponin T 0.499 ng/mL     Narrative:      Troponin T Reference Range:  <= 0.03 ng/mL-   Negative for AMI  >0.03 ng/mL-     Abnormal for myocardial necrosis.  Clinicians would have to utilize clinical acumen, EKG, Troponin and serial changes to determine if it is an Acute Myocardial Infarction or myocardial injury due to an underlying chronic condition.       Results may be falsely decreased if patient taking Biotin.      Basic Metabolic Panel [[id number]]  (Abnormal) Collected: 11/11/21 0116    Specimen: Blood Updated: 11/11/21 0202     Glucose 156 mg/dL      BUN 15 mg/dL      Creatinine 0.93 mg/dL      Sodium 139 mmol/L      Potassium 4.3 mmol/L      Chloride 107 mmol/L      CO2 21.0 mmol/L      Calcium 7.8 mg/dL      eGFR Non African Amer 61 mL/min/1.73      BUN/Creatinine Ratio 16.1     Anion Gap 11.0 mmol/L     Narrative:      GFR Normal >60  Chronic Kidney Disease <60  Kidney Failure <15      Magnesium [[id number]]  (Normal) Collected: 11/11/21 0116    Specimen: Blood Updated: 11/11/21 0202     Magnesium 1.9 mg/dL     Hemoglobin & Hematocrit, Blood [[id number]]  (Abnormal) Collected: 11/11/21 0116    Specimen: Blood Updated: 11/11/21 0149     Hemoglobin 7.8 g/dL      Hematocrit 25.7 %     D-dimer, Quantitative [[id number]]  (Abnormal) Collected: 11/10/21 2123    Specimen: Blood Updated: 11/11/21 0023     D-Dimer, Quantitative 1.10 mg/L (FEU)     Narrative:      Reference Range  --------------------------------------------------------------------     < 0.50   Negative Predictive Value  0.50-0.59   Indeterminate    >= 0.60   Probable VTE             A very low percentage of patients with DVT may  "yield D-Dimer results   below the cut-off of 0.50 mg/L FEU.  This is known to be more   prevalent in patients with distal DVT.             Results of this test should always be interpreted in conjunction with   the patient's medical history, clinical presentation and other   findings.  Clinical diagnosis should not be based on the result of   [place] D-Dimer alone.    aPTT [[id number]]  (Normal) Collected: 11/10/21 2123    Specimen: Blood Updated: 11/10/21 2237     PTT 24.1 seconds     Protime-INR [[id number]]  (Normal) Collected: 11/10/21 1511    Specimen: Blood Updated: 11/10/21 2135     Protime 10.5 Seconds      INR 0.94    Procalcitonin [[id number]]  (Normal) Collected: 11/10/21 1952    Specimen: Blood Updated: 11/10/21 2048     Procalcitonin 0.06 ng/mL     Narrative:      As a Marker for Sepsis (Non-Neonates):     1. <0.5 ng/mL represents a low risk of severe sepsis and/or septic shock.  2. >2 ng/mL represents a high risk of severe sepsis and/or septic shock.    As a Marker for Lower Respiratory Tract Infections that require antibiotic therapy:  PCT on Admission     Antibiotic Therapy             6-12 Hrs later  >0.5                          Strongly Recommended            >0.25 - <0.5             Recommended  0.1 - 0.25                  Discouraged                       Remeasure/reassess PCT  <0.1                         Strongly Discouraged         Remeasure/reassess PCT      As 28 day mortality risk marker: \"Change in Procalcitonin Result\" (>80% or <=80%) if Day 0 (or Day 1) and Day 4 values are available. Refer to http://www.[email or web address]-pct-calculator.com/    Change in PCT <=80 %   A decrease of PCT levels below or equal to 80% defines a positive change in PCT test result representing a higher risk for 28-day all-cause mortality of patients diagnosed with severe sepsis or septic shock.    Change in PCT >80 %   A decrease of PCT levels of more than 80% defines a negative change in PCT result representing a lower " risk for 28-day all-cause mortality of patients diagnosed with severe sepsis or septic shock.                Troponin [[id number]]  (Abnormal) Collected: 11/10/21 1952    Specimen: Blood Updated: 11/10/21 2044     Troponin T 0.799 ng/mL     Narrative:      Troponin T Reference Range:  <= 0.03 ng/mL-   Negative for AMI  >0.03 ng/mL-     Abnormal for myocardial necrosis.  Clinicians would have to utilize clinical acumen, EKG, Troponin and serial changes to determine if it is an Acute Myocardial Infarction or myocardial injury due to an underlying chronic condition.       Results may be falsely decreased if patient taking Biotin.      Phosphorus [[id number]]  (Normal) Collected: 11/10/21 1952    Specimen: Blood Updated: 11/10/21 2039     Phosphorus 3.3 mg/dL     Magnesium [[id number]]  (Normal) Collected: 11/10/21 1952    Specimen: Blood Updated: 11/10/21 2039     Magnesium 1.9 mg/dL     BNP [[id number]]  (Abnormal) Collected: 11/10/21 1511    Specimen: Blood Updated: 11/10/21 1742     proBNP 1,187.0 pg/mL     Narrative:      Among patients with dyspnea, NT-proBNP is highly sensitive for the detection of acute congestive heart failure. In addition NT-proBNP of <300 pg/ml effectively rules out acute congestive heart failure with 99% negative predictive value.    Results may be falsely decreased if patient taking Biotin.      Extra Tubes [[id number]] Collected: 11/10/21 1511    Specimen: Blood Updated: 11/10/21 1617    Narrative:      The following orders were created for panel order Extra Tubes.  Procedure                               Abnormality         Status                     ---------                               -----------         ------                     Gold Top - SST[[id number]]                                   Final result               Light Blue Top[[id number]]                                   Final result                 Please view results for these tests on the individual orders.    Gold Top - SST  [[id number]] Collected: 11/10/21 1511    Specimen: Blood Updated: 11/10/21 1617     Extra Tube Hold for add-ons.     Comment: Auto resulted.       Light Blue Top [[id number]] Collected: 11/10/21 1511    Specimen: Blood Updated: 11/10/21 1617     Extra Tube hold for add-on     Comment: Auto resulted       CBC & Differential [[id number]]  (Abnormal) Collected: 11/10/21 1511    Specimen: Blood Updated: 11/10/21 1602    Narrative:      The following orders were created for panel order CBC & Differential.  Procedure                               Abnormality         Status                     ---------                               -----------         ------                     CBC Auto Differential[[id number]]        Abnormal            Final result               Scan Slide[[id number]]                                       Final result                 Please view results for these tests on the individual orders.    CBC Auto Differential [[id number]]  (Abnormal) Collected: 11/10/21 1511    Specimen: Blood Updated: 11/10/21 1602     WBC 6.40 10*3/mm3      RBC 3.46 10*6/mm3      Hemoglobin 6.2 g/dL      Hematocrit 20.7 %      MCV 59.9 fL      MCH 18.0 pg      MCHC 30.0 g/dL      RDW 20.7 %      RDW-SD 43.8 fl      MPV 7.1 fL      Platelets 484 10*3/mm3      Neutrophil % 67.5 %      Lymphocyte % 18.2 %      Monocyte % 10.8 %      Eosinophil % 1.6 %      Basophil % 1.9 %      Neutrophils, Absolute 4.30 10*3/mm3      Lymphocytes, Absolute 1.20 10*3/mm3      Monocytes, Absolute 0.70 10*3/mm3      Eosinophils, Absolute 0.10 10*3/mm3      Basophils, Absolute 0.10 10*3/mm3      nRBC 0.1 /100 WBC     Narrative:      Appended report. These results have been appended to a previously verified report.    Scan Slide [[id number]] Collected: 11/10/21 1511    Specimen: Blood Updated: 11/10/21 1602     Microcytes Mod/2+     WBC Morphology Normal     Platelet Morphology Normal    Respiratory Panel PCR w/COVID-19(SARS-CoV-2)  MIRIAM/JENNY/CELINE/PAD/COR/MAD/[facility] In-House, NP Swab in UTM/VTM, 3-4 HR TAT - Swab, Nasopharynx [[id number]]  (Normal) Collected: 11/10/21 1501    Specimen: Swab from Nasopharynx Updated: 11/10/21 1559     ADENOVIRUS, PCR Not Detected     Coronavirus 229E Not Detected     Coronavirus HKU1 Not Detected     Coronavirus NL63 Not Detected     Coronavirus OC43 Not Detected     COVID19 Not Detected     Human Metapneumovirus Not Detected     Human Rhinovirus/Enterovirus Not Detected     Influenza A PCR Not Detected     Influenza B PCR Not Detected     Parainfluenza Virus 1 Not Detected     Parainfluenza Virus 2 Not Detected     Parainfluenza Virus 3 Not Detected     Parainfluenza Virus 4 Not Detected     RSV, PCR Not Detected     Bordetella pertussis pcr Not Detected     Bordetella parapertussis PCR Not Detected     Chlamydophila pneumoniae PCR Not Detected     Mycoplasma pneumo by PCR Not Detected    Narrative:      In the setting of a positive respiratory panel with a viral infection PLUS a negative procalcitonin without other underlying concern for bacterial infection, consider observing off antibiotics or discontinuation of antibiotics and continue supportive care. If the respiratory panel is positive for atypical bacterial infection (Bordetella pertussis, Chlamydophila pneumoniae, or Mycoplasma pneumoniae), consider antibiotic de-escalation to target atypical bacterial infection.    Troponin [[id number]]  (Abnormal) Collected: 11/10/21 1511    Specimen: Blood Updated: 11/10/21 1548     Troponin T 0.899 ng/mL     Narrative:      Troponin T Reference Range:  <= 0.03 ng/mL-   Negative for AMI  >0.03 ng/mL-     Abnormal for myocardial necrosis.  Clinicians would have to utilize clinical acumen, EKG, Troponin and serial changes to determine if it is an Acute Myocardial Infarction or myocardial injury due to an underlying chronic condition.       Results may be falsely decreased if patient taking Biotin.      Basic Metabolic Panel  [[id number]]  (Abnormal) Collected: 11/10/21 1511    Specimen: Blood Updated: 11/10/21 1542     Glucose 96 mg/dL      BUN 17 mg/dL      Creatinine 1.22 mg/dL      Sodium 138 mmol/L      Potassium 4.3 mmol/L      Chloride 104 mmol/L      CO2 24.0 mmol/L      Calcium 8.0 mg/dL      eGFR Non African Amer 44 mL/min/1.73      BUN/Creatinine Ratio 13.9     Anion Gap 10.0 mmol/L     Narrative:      GFR Normal >60  Chronic Kidney Disease <60  Kidney Failure <15               Pending Results:     Imaging Reviewed:   CT Abdomen Pelvis Without Contrast    Result Date: 11/11/2021  1. Marked fluid distention of the included esophagus, status post lap band placement. This suggests an abnormally tight band resulting in obstruction. Given the degree of esophageal distention, some degree of chronicity is likely present. 2. There is otherwise no evidence of acute disease in the abdomen or pelvis. 3. Evidence of recent contrast administration. Electronically signed by:  German Gardner M.D.  11/10/2021 10:08 PM    CT Chest Pulmonary Embolism    Result Date: 11/10/2021   1. No acute pulmonary embolic disease. 2. The esophagus is markedly dilated and fluid-filled to the level of the thoracic inlet. The patient has had a previous gastric lap band procedure. This suggests that the gastric lap band may have too much tension obstructing the gastroesophageal junction. 3. No active pulmonary disease. 4. Additional incidental findings as noted above.  Electronically Signed By-Car Jackson MD On:11/10/2021 9:16 PM This report was finalized on [id number] by  Car Jackson MD.    XR Chest PA & Lateral    Result Date: 11/9/2021  No acute chest finding.  Electronically Signed By-Edel Glass MD On:11/9/2021 4:14 PM This report was finalized on [id number] by  Edel Glass MD.           Assessment/Plan   ASSESSMENT  Symptomatic anemia: Hemoglobin 6.2-admission: received 1 unit of PRBC's. Hemoglobin today is 7.6. Iron sat 3%, iron 14,  transferrin 292, TIBC 435, ferritin 3.51. Differentials include GI bleed, inflammation, infection, DIC, malabsorption, dietary deficiencies, heart, kidney disease.  Will give patient iv iron due to iron sat being 3% and will obtain anemia work up.  Most likely anemia is related to GE junction. FOBT pending, Doppler lower extremities- pending;  CT PE protocol showed no acute PE.  The esophagus is markedly dilated and fluid filled to the level of thoracic inlet.  Patient patient had a previous gastric lap band procedure.  This suggest that the gastric lap band may have too much tension obstructing the gastroesophageal junction.   consulted for evaluation and management of possible GE junction obstruction.  Denies history of malignancy or ever having EGD/colonoscopy.  Denies fever, chills, chest pain, abdominal pain, nausea, vomiting, diarrhea, dysuria, hematuria, melena, hematochezia or hematemesis.  Past history of PE several years ago.    Dyspnea: has improved. Patient on room air  Elevated D-dimer: CTA chest negative for PE, other findings noted, general surgery consulted for possible GE junction obstruction; pending doppler for DVT  Elevated troponin: Likely demand ischemia from anemia.  Echo ordered. Cardiology pending  DVT prophylaxis: SCD for now  Hx PE: has been several years ago. Was on anticoagulants for short period, No longer on anticoagulation.  CTA negative for PE    PLAN  1. Administer Ferrlecit 250mg IVPB today  2. General surgery consulted for possible GE junction obstruction.   3. GI consult for anemia and outpatient colonoscopy  4. Obtain anemia profile  5. Pending bilateral doppler LE to rule out DVT  6. Pending FOBT  7. Pending ECHO  8. Continue supportive care  9. Will continue follow up.       Electronically signed by OTILIO Mahmood, 11/11/21, 11:33 AM EST.          Thank you for this consult. We will be happy to follow along with you.       Ms. Odom was admitted with symptoms of  anemia.  She also had very significant pica.  Her laboratory exams were consistent with both severe anemia and clear iron deficiency.  She has a history of a myeloproliferative disorder that was treated 25 years before this admission and she seems to have had a very good response.  At this point there is no suggestion of myeloproliferative disorder.  She has not seen her physician in more than 10 years.  She did have hematochezia sometime ago.  It lasted briefly and she underwent a colonoscopy that apparently did not disclose the source of the bleeding.  After that she became asymptomatic.  On direct questioning she does not have many more symptoms than the above.  She has been afebrile.  She has been free of chest pains.  She eats well and has not had any fluctuations in weight.  She is free of dysphagia and denies hematemesis.  She has not been coughing more than usual and does not have hemoptysis.  She is also free of abdominal pain.  Lately she has neither had melena nor hematochezia.  No dysuria or hematuria.  No vaginal bleeding or discharge.  She has been free of peripheral edema or skin rashes.At the time of admission she had severe anemia with very severe microcytosis and moderate thrombocytosis, all of which, of course, are highly suggestive of iron deficiency.  Her ferritin was only 3.5 and she had a total iron binding capacity in the upper part of the range of normalcy with a very low saturation.  This confirms with very little place to doubt iron deficiency.  This most likely is the result of chronic blood loss from the gastrointestinal tract.  This in spite of the fact that she has not had melena or hematochezia lately.  The rest of the work-up for anemia is completely unremarkable and there is no suggestion of hemolysis.  She is to have an upper gastrointestinal and lower gastrointestinal endoscopy tomorrow that hopefully will clarify the source of bleeding.  Given her lack of symptoms I suspect that  angiodysplasia or something along those lines is going to be responsible for chronic bleeding.  I will continue to follow her.  At this point that she will be treated with iron and will receive blood transfusion.    Alex Becerra MD on November 11, 2021 at 5 PM

## 2021-11-11 NOTE — PROGRESS NOTES
[facility] Medicine Services Daily Progress Note    Patient Name: Duyen Odom  : 1955  MRN: [id number]  Primary Care Physician:  Ellen Red, APRN  Date of admission: 11/10/2021      Subjective      Chief Complaint: Dyspnea    2021: Patient seen and examined this morning.  Feels better compared to yesterday.  Dyspnea improving.  No dizziness or syncope.  States she has not followed up with any doctors for a while, last saw her PCP in early  and denies any history of anemia, no history of malignancy or ever had EGD/colonoscopy.    Denies any associated fever, chills, chest pain, abdominal pain, nausea, vomiting or diarrhea, dysuria, hematuria, melena, hematochezia, or hematemesis.    Objective      Vitals:   Temp:  [97.5 °F (36.4 °C)-99.4 °F (37.4 °C)] 97.9 °F (36.6 °C)  Heart Rate:  [] 81  Resp:  [15-20] 16  BP: (109-150)/(56-91) 113/70    Physical Exam:    General: Awake, alert, elderly female, lying in bed, NAD  Eyes: PERRL, EOMI, conjunctive are clear  Cardiovascular: Regular rate and rhythm, no murmurs  Respiratory: Clear to auscultation bilaterally, no wheezing or rales, unlabored breathing  Abdomen: Soft, nontender, positive bowel sounds, no guarding  Neurologic: A&O, CN grossly intact, moves all extremities spontaneously  Musculoskeletal: Normal range of motion, no deformities  Skin: Warm, dry, intact         Result Review    Result Review:  I have personally reviewed the results from the time of this admission to 2021 09:42 EST and agree with these findings:  [x]  Laboratory  [x]  Microbiology  [x]  Radiology  [x]  EKG/Telemetry   [x]  Cardiology/Vascular   []  Pathology  [x]  Old records  []  Other:          Assessment/Plan      Brief Patient Summary:  Duyen Odom is a 66 y.o. female w/PMH of HLD, HTN who presents to [facility] ED due to dyspnea.  Patient states dyspnea has progressively worsened over the last 2 weeks, dyspnea is  worse with exertion, no aggravating or alleviating factors noted. As dyspnea did not improve she decided to go to [facility] ED. in the ED patient noted to have hemoglobin of 6.2 with mildly elevated troponin and elevated D-dimer.  CTA chest negative for PE but does show changes of lap banding previously and possible obstruction however patient not having any GI symptoms.  General surgery consulted.  Cardiology also consulted for elevated troponin.  Patient denies any ACS symptoms.  She was transfused 1 unit of PRBCs with improvement.  No signs of any GI bleed.  Hematology consulted.      pantoprazole, 40 mg, Intravenous, Q12H  sodium chloride, 10 mL, Intravenous, Q12H             Active Hospital Problems:  Active Hospital Problems    Diagnosis    • **Symptomatic anemia    • Dyspnea    • Elevated troponin    • Elevated d-dimer    • History of COVID-19      Plan:   Dyspnea  Symptomatic anemia  -Dyspnea likely caused by the anemia, hemoglobin 6.2 on admission, transfused 1 unit with improvement  -No signs of any GI bleed, FOBT ordered  -Iron panel with ferritin ordered  -Patient on room air, Covid negative  -Hematology consult    Elevated D-dimer  History of gastric lap band  -CTA chest negative for PE but other findings noted  -Patient denies any GI symptoms currently  -General surgery consulted  -Bilateral LE venous Doppler also ordered to rule out DVT    Elevated troponin  -Likely demand ischemia from anemia  -EKG reviewed, trend troponins  -Echo ordered  -Cardiology following, may need stress test    DVT prophylaxis  -SCDs for now        CODE STATUS:    Level Of Support Discussed With: Patient  Code Status (Patient has no pulse and is not breathing): CPR (Attempt to Resuscitate)  Medical Interventions (Patient has pulse or is breathing): Full Support      Disposition: Home once improved    Electronically signed by Rashad Whitfield DO, 11/11/21, 09:42 EST.  [facility] Hospitalist Team

## 2021-11-11 NOTE — PLAN OF CARE
Goal Outcome Evaluation:              Outcome Summary: Patient had stress test and echocardiogram performed today. Also, fluid drained from her lap band. pt waiting lab results, states she is feeling better.

## 2021-11-11 NOTE — PLAN OF CARE
Goal Outcome Evaluation:  Plan of Care Reviewed With: patient           Outcome Summary: Pt resting in bed with no complaints at this time, will continue to observe.

## 2021-11-11 NOTE — CONSULTS
"GI CONSULT  NOTE:    Referring Provider:  Dr. Whitfield    Chief complaint: Anemia    Subjective . \"I was having shortness of breath and chest pain\"    History of present illness: Duyen Odom is a 66 y.o. female who has a history of HTN and HLD.  She presented yesterday with a 2-week history of increased shortness of breath, cough, fever, fatigue and worsening edema.  She was found to have symptomatic anemia, elevated troponin and D-dimer.  Abnormal CT suggested markedly distended esophagus with fluid with transition at known LAP-BAND.  She was evaluated by general surgery who removed 4 cc of fluid from the LAP-BAND.  GI asked consult for anemia. She reports a 2-week history of progressive shortness of breath, chest pain, cough, fever and edema. She was found to have symptomatic anemia and reports all symptoms resolved after blood transfusion. She has intermittent nausea and vomiting but denies any hematemesis or coffee-ground emesis. No heartburn, difficulty swallowing or abdominal pain. No NSAID use. She reports LAP-BAND 15 years ago without history of anemia. She has chronic constipation with a bowel movement weekly and denies melena or rectal bleeding. Denies known history of liver disease or pancreatitis. Occasional alcohol use but not routinely. Appetite and weight stable    Endo History:  Reports EGD many years ago with balloon placement,? Weight loss  No previous colonoscopy    Past Medical History:  Past Medical History:   Diagnosis Date   • Anemia    • Chest tightness    • Elevated troponin    • High platelet count    • Hyperlipidemia    • Hypertension    • Positive D-dimer        Past Surgical History:  Past Surgical History:   Procedure Laterality Date   • ABDOMINAL SURGERY      lap band   •  SECTION     • HYSTERECTOMY     • LAPAROSCOPIC GASTRIC BANDING         Social History:  Social History     Tobacco Use   • Smoking status: Never Smoker   • Smokeless tobacco: Never Used   Vaping Use   • " Vaping Use: Never used   Substance Use Topics   • Alcohol use: Yes     Comment: occ.   • Drug use: Never   Occasional alcohol use, no tobacco use    Family History:  Family History   Problem Relation Age of Onset   • Mental illness Mother    • Hypertension Mother    • Diabetes Father    • Heart disease Father    No family history of esophageal, stomach or colon cancer    Medications:  Medications Prior to Admission   Medication Sig Dispense Refill Last Dose   • azithromycin (Zithromax Z-Aneesh) 250 MG tablet Take 2 tablets by mouth on day 1, then 1 tablet daily on days 2-5 6 tablet 0 11/10/2021 at Unknown time   • benzonatate (Tessalon Perles) 100 MG capsule Take 1 capsule by mouth 3 (Three) Times a Day As Needed for Cough. 30 capsule 1 11/10/2021 at Unknown time   • predniSONE (DELTASONE) 10 MG (48) dose pack Take as directed on pack 1 each 0 11/10/2021 at Unknown time       Scheduled Meds:ferric gluconate, 250 mg, Intravenous, Once  pantoprazole, 40 mg, Intravenous, Q12H  sodium chloride, 10 mL, Intravenous, Q12H      Continuous Infusions:   PRN Meds:.•  acetaminophen **OR** acetaminophen **OR** acetaminophen  •  Calcium Gluconate-NaCl **AND** calcium gluconate **AND** Calcium, Ionized  •  magnesium sulfate **OR** magnesium sulfate **OR** magnesium sulfate  •  nitroglycerin  •  ondansetron **OR** ondansetron  •  [COMPLETED] Insert peripheral IV **AND** sodium chloride  •  sodium chloride    ALLERGIES:  Iodinated diagnostic agents    ROS:  The following systems were reviewed  Constitution:  No fevers, chills, no unintentional weight loss  Skin: no rash, no jaundice  Eyes:  No blurry vision, no eye pain  HENT:  No change in hearing or smell  Resp: Dyspnea with cough  CV: Chest pain without palpitations  :  No dysuria, hematuria  Musculoskeletal:  No leg cramps or arthralgias, weakness and fatigue  Neuro:  No tremor, no numbness  Psych:  No depression or confusion    Objective Resting in bed, no acute distress, no  "family present, nurse in room    Vital Signs:   Vitals:    11/11/21 0730 11/11/21 1200 11/11/21 1356 11/11/21 1416   BP: 113/70 126/75 116/74    BP Location:       Patient Position:       Pulse: 81 68     Resp: 16 16     Temp: 97.9 °F (36.6 °C) 98.3 °F (36.8 °C)     TempSrc: Oral Oral     SpO2: 97% 100%     Weight:    59.9 kg (132 lb)   Height:    152.4 cm (60\")       Physical Exam:       General Appearance:    Awake and alert, in no acute distress   Head:    Normocephalic, without obvious abnormality, atraumatic   Throat:   No oral lesions, no thrush, oral mucosa moist   Lungs:     Respirations regular, even and unlabored   Chest Wall:    No abnormalities observed   Abdomen:     Soft, non-tender, nondistended, LAP-BAND port noted   Rectal:     Deferred   Extremities:   Moves all extremities,  no cyanosis       Skin:   No rash, no jaundice, normal palpation, pale       Neurologic:   Cranial nerves 2 - 12 grossly intact       Results Review:   I reviewed the patient's labs and imaging.  CBC    Results from last 7 days   Lab Units 11/11/21  1103 11/11/21  0921 11/11/21  0116 11/10/21  1511 11/09/21  1534   WBC 10*3/mm3  --  3.90  --  6.40 7.0   HEMOGLOBIN g/dL 7.6* 7.6* 7.8* 6.2* 6.3*   PLATELETS 10*3/mm3  --  443  --  484* 527*     CMP   Results from last 7 days   Lab Units 11/11/21  1103 11/11/21  0116 11/10/21  1952 11/10/21  1511 11/09/21  1534   SODIUM mmol/L  --  139  --  138 140   POTASSIUM mmol/L  --  4.3  --  4.3 CANCELED   CHLORIDE mmol/L  --  107  --  104 103   TOTAL CO2 mmol/L  --   --   --   --  20   CO2 mmol/L  --  21.0*  --  24.0  --    BUN mg/dL  --  15  --  17 17   CREATININE mg/dL  --  0.93  --  1.22* 1.34*   GLUCOSE mg/dL  --  156*  --  96 CANCELED   ALBUMIN g/dL  --   --   --   --  3.8   BILIRUBIN mg/dL 0.5  --   --   --  0.2   ALK PHOS IU/L  --   --   --   --  82   AST (SGOT) IU/L  --   --   --   --  20   ALT (SGPT) IU/L  --   --   --   --  9   MAGNESIUM mg/dL  --  1.9 1.9  --   --    PHOSPHORUS " mg/dL  --   --  3.3  --   --      Cr Clearance Estimated Creatinine Clearance: 48.8 mL/min (by C-G formula based on SCr of 0.93 mg/dL).  Coag   Results from last 7 days   Lab Units 11/10/21  2123 11/10/21  1511   INR   --  0.94   APTT seconds 24.1  --      HbA1C No results found for: HGBA1C  Blood Glucose No results found for: POCGLU  Infection   Results from last 7 days   Lab Units 11/10/21  1952   PROCALCITONIN ng/mL 0.06     UA    Results from last 7 days   Lab Units 11/11/21  1006   NITRITE UA  Negative     Radiology(recent) CT Abdomen Pelvis Without Contrast    Result Date: 11/11/2021  1. Marked fluid distention of the included esophagus, status post lap band placement. This suggests an abnormally tight band resulting in obstruction. Given the degree of esophageal distention, some degree of chronicity is likely present. 2. There is otherwise no evidence of acute disease in the abdomen or pelvis. 3. Evidence of recent contrast administration. Electronically signed by:  German Gardner M.D.  11/10/2021 10:08 PM    CT Chest Pulmonary Embolism    Result Date: 11/10/2021   1. No acute pulmonary embolic disease. 2. The esophagus is markedly dilated and fluid-filled to the level of the thoracic inlet. The patient has had a previous gastric lap band procedure. This suggests that the gastric lap band may have too much tension obstructing the gastroesophageal junction. 3. No active pulmonary disease. 4. Additional incidental findings as noted above.  Electronically Signed By-Car Jackson MD On:11/10/2021 9:16 PM This report was finalized on [id number] by  Car Jackson MD.    XR Chest PA & Lateral    Result Date: 11/9/2021  No acute chest finding.  Electronically Signed By-Edel Glass MD On:11/9/2021 4:14 PM This report was finalized on [id number] by  Edel Glass MD.         ASSESSMENT:  Symptomatic anemia/iron deficiency  Dyspnea-resolved  Chronic constipation  Elevated troponin/D-dimer  History of lap band  s/p 4ml removed 11/11/2021  Hypertension/hyperlipidemia    PLAN:  Patient presents with a 2-week history of symptomatic iron deficiency anemia without evidence of active blood loss. She reports history of lap band placed 15 years ago and CT suggested markedly fluid distended esophagus with transition of the LAP-BAND. General surgery evaluated and removed 4 cc of fluid from the band. After blood transfusion, she reports dyspnea and chest pain resolved. Echo and stress test results pending. Secondary to iron deficiency anemia and abnormal CT findings, we will proceed with EGD evaluation in a.m. Rule out erosion from LAP-BAND vs ulcer vs GERD. Would recommend routine outpatient colonoscopy as well for further evaluation. Continue supportive care and further recommendations to follow EGD findings. Currently getting IV iron.    I discussed the patients findings and my recommendations with the patient.    We appreciate the referral    Electronically signed by OTILIO Zaman, 11/11/21, 3:04 PM EST.

## 2021-11-11 NOTE — CONSULTS
GENERAL SURGERY CONSULT      Patient Care Team:  Ellen Red APRN as PCP - General (Nurse Practitioner)    Chief complaint dyspnea   Subjective     There is a 66-year-old lady who presents with dyspnea that has worsened over the past 2 weeks.  Upon arrival to the emergency room she was found to be anemic with a hemoglobin of 6.2.  A CT scan of the chest demonstrated that she had a lap band with a severely dilated esophagus.  She had a lap band placed in the year  and has not had an adjustment since .  She says she lost 150 pounds.  She says now she has to eat food that is cut up into very small pieces.    Review of Systems   All systems were reviewed and negative except for:  Gastrointestinal: positive for  nausea, pain and See HPI    History  Past Medical History:   Diagnosis Date   • Anemia    • Chest tightness    • Elevated troponin    • High platelet count    • Hyperlipidemia    • Hypertension    • Positive D-dimer      Past Surgical History:   Procedure Laterality Date   • ABDOMINAL SURGERY      lap band   •  SECTION     • HYSTERECTOMY     • LAPAROSCOPIC GASTRIC BANDING       Family History   Problem Relation Age of Onset   • Mental illness Mother    • Hypertension Mother    • Diabetes Father    • Heart disease Father      Social History     Tobacco Use   • Smoking status: Never Smoker   • Smokeless tobacco: Never Used   Vaping Use   • Vaping Use: Never used   Substance Use Topics   • Alcohol use: Yes     Comment: occ.   • Drug use: Never     Medications Prior to Admission   Medication Sig Dispense Refill Last Dose   • azithromycin (Zithromax Z-Aneesh) 250 MG tablet Take 2 tablets by mouth on day 1, then 1 tablet daily on days 2-5 6 tablet 0 11/10/2021 at Unknown time   • benzonatate (Tessalon Perles) 100 MG capsule Take 1 capsule by mouth 3 (Three) Times a Day As Needed for Cough. 30 capsule 1 11/10/2021 at Unknown time   • predniSONE (DELTASONE) 10 MG (48) dose pack Take as directed on pack  1 each 0 11/10/2021 at Unknown time     Allergies:  Iodinated diagnostic agents    Objective     Vital Signs  Temp:  [97.5 °F (36.4 °C)-99.4 °F (37.4 °C)] 97.9 °F (36.6 °C)  Heart Rate:  [] 81  Resp:  [15-20] 16  BP: (109-150)/(56-91) 113/70    Physical Exam:      General Appearance:    Alert, cooperative, in no acute distress   Head:    Normocephalic, without obvious abnormality, atraumatic,    Eyes:            Lids and lashes normal, conjunctivae and sclerae normal, no   icterus, no pallor, corneas clear, PERRLA   Ears:    Ears appear intact with no abnormalities noted   Throat:   No oral lesions, no thrush, oral mucosa moist   Neck:   No adenopathy, supple, trachea midline, no thyromegaly, no   carotid bruit, no JVD   Back:     No kyphosis present, no scoliosis present, no skin lesions,      erythema or scars, no tenderness to percussion or                   palpation,   range of motion normal   Lungs:     Clear to auscultation,respirations regular, even and                  unlabored    Heart:    Regular rhythm and normal rate, normal S1 and S2, no            murmur, no gallop, no rub, no click   Chest Wall:    No abnormalities observed   Abdomen:     Normal bowel sounds, no masses, no organomegaly, soft        non-tender, non-distended, no guarding, no rebound                tenderness   Rectal:     Deferred   Extremities: No edema, good ROM   Pulses:   Pulses palpable and equal bilaterally   Skin:   No bleeding, bruising or rash   Lymph nodes:   No palpable adenopathy   Neurologic:   Cranial nerves 2 - 12 grossly intact, sensation intact, DTR       present and equal bilaterally     Lab Results (last 24 hours)     Procedure Component Value Units Date/Time    Bilirubin, Total & Direct [[id number]] Collected: 11/11/21 1103    Specimen: Blood Updated: 11/11/21 1140     Total Bilirubin 0.5 mg/dL      Bilirubin, Direct 0.2 mg/dL      Bilirubin, Indirect 0.3 mg/dL     Lactate Dehydrogenase [[id number]]   (Abnormal) Collected: 11/11/21 1103    Specimen: Blood Updated: 11/11/21 1140      U/L     Urine Drug Screen - Urine, Clean Catch [[id number]]  (Normal) Collected: 11/11/21 1006    Specimen: Urine, Clean Catch Updated: 11/11/21 1132     Amphet/Methamphet, Screen Negative     Barbiturates Screen, Urine Negative     Benzodiazepine Screen, Urine Negative     Cocaine Screen, Urine Negative     Opiate Screen Negative     THC, Screen, Urine Negative     Methadone Screen, Urine Negative     Oxycodone Screen, Urine Negative    Narrative:      Negative Thresholds Per Drugs Screened:    Amphetamines                 500 ng/ml  Barbiturates                 200 ng/ml  Benzodiazepines              100 ng/ml  Cocaine                      300 ng/ml  Methadone                    300 ng/ml  Opiates                      300 ng/ml  Oxycodone                    100 ng/ml  THC                           50 ng/ml    The Normal Value for all drugs tested is negative. This report includes final unconfirmed screening results to be used for medical treatment purposes only. Unconfirmed results must not be used for non-medical purposes such as employment or legal testing. Clinical consideration should be applied to any drug of abuse test, particularly when unconfirmed results are used.          All urine drugs of abuse requests without chain of custody are for medical screening purposes only.  False positives are possible.      Hemoglobin & Hematocrit, Blood [[id number]]  (Abnormal) Collected: 11/11/21 1103    Specimen: Blood Updated: 11/11/21 1118     Hemoglobin 7.6 g/dL      Hematocrit 24.3 %     Reticulocytes [[id number]]  (Normal) Collected: 11/11/21 0921    Specimen: Blood Updated: 11/11/21 1116     Reticulocyte % 1.38 %      Reticulocyte Absolute 0.0525 10*6/mm3     Haptoglobin [[id number]] Collected: 11/11/21 1103    Specimen: Blood Updated: 11/11/21 1112    Vitamin B12 [[id number]] Collected: 11/11/21 1103    Specimen: Blood Updated:  11/11/21 1112    Folate [[id number]] Collected: 11/11/21 1103    Specimen: Blood Updated: 11/11/21 1112    Folate RBC [[id number]] Collected: 11/11/21 1103    Specimen: Blood Updated: 11/11/21 1112    Methylmalonic Acid, Serum [[id number]] Collected: 11/11/21 1103    Specimen: Blood Updated: 11/11/21 1112    THAIS + PE [[id number]] Collected: 11/11/21 1103    Specimen: Blood Updated: 11/11/21 1111    Urinalysis With Culture If Indicated - Urine, Clean Catch [[id number]]  (Abnormal) Collected: 11/11/21 1006    Specimen: Urine, Clean Catch Updated: 11/11/21 1051     Color, UA Yellow     Appearance, UA Clear     pH, UA 6.0     Specific Gravity, UA 1.052     Glucose, UA Negative     Ketones, UA Negative     Bilirubin, UA Negative     Blood, UA Negative     Protein, UA Negative     Leuk Esterase, UA Negative     Nitrite, UA Negative     Urobilinogen, UA 1.0 E.U./dL    Narrative:      Urine microscopic not indicated.    CBC & Differential [[id number]]  (Abnormal) Collected: 11/11/21 0921    Specimen: Blood Updated: 11/11/21 1027    Narrative:      The following orders were created for panel order CBC & Differential.  Procedure                               Abnormality         Status                     ---------                               -----------         ------                     CBC Auto Differential[[id number]]        Abnormal            Final result               Scan Slide[[id number]]                                                                    Please view results for these tests on the individual orders.    CBC Auto Differential [[id number]]  (Abnormal) Collected: 11/11/21 0921    Specimen: Blood Updated: 11/11/21 1027     WBC 3.90 10*3/mm3      RBC 3.80 10*6/mm3      Hemoglobin 7.6 g/dL      Hematocrit 25.1 %      MCV 66.1 fL      MCH 19.9 pg      MCHC 30.1 g/dL      RDW 27.1 %      RDW-SD 60.4 fl      MPV 7.5 fL      Platelets 443 10*3/mm3      Neutrophil % 80.8 %      Lymphocyte % 14.9 %      Monocyte % 4.0 %       Eosinophil % 0.0 %      Basophil % 0.3 %      Neutrophils, Absolute 3.20 10*3/mm3      Lymphocytes, Absolute 0.60 10*3/mm3      Monocytes, Absolute 0.20 10*3/mm3      Eosinophils, Absolute 0.00 10*3/mm3      Basophils, Absolute 0.00 10*3/mm3      nRBC 0.1 /100 WBC     Ferritin [[id number]]  (Abnormal) Collected: 11/10/21 1952    Specimen: Blood Updated: 11/11/21 0812     Ferritin 3.51 ng/mL     Narrative:      Results may be falsely decreased if patient taking Biotin.      Iron Profile [[id number]]  (Abnormal) Collected: 11/10/21 1952    Specimen: Blood Updated: 11/11/21 0807     Iron 14 mcg/dL      Iron Saturation 3 %      Transferrin 292 mg/dL      TIBC 435 mcg/dL     Troponin [[id number]]  (Abnormal) Collected: 11/11/21 0116    Specimen: Blood Updated: 11/11/21 0207     Troponin T 0.499 ng/mL     Narrative:      Troponin T Reference Range:  <= 0.03 ng/mL-   Negative for AMI  >0.03 ng/mL-     Abnormal for myocardial necrosis.  Clinicians would have to utilize clinical acumen, EKG, Troponin and serial changes to determine if it is an Acute Myocardial Infarction or myocardial injury due to an underlying chronic condition.       Results may be falsely decreased if patient taking Biotin.      Basic Metabolic Panel [[id number]]  (Abnormal) Collected: 11/11/21 0116    Specimen: Blood Updated: 11/11/21 0202     Glucose 156 mg/dL      BUN 15 mg/dL      Creatinine 0.93 mg/dL      Sodium 139 mmol/L      Potassium 4.3 mmol/L      Chloride 107 mmol/L      CO2 21.0 mmol/L      Calcium 7.8 mg/dL      eGFR Non African Amer 61 mL/min/1.73      BUN/Creatinine Ratio 16.1     Anion Gap 11.0 mmol/L     Narrative:      GFR Normal >60  Chronic Kidney Disease <60  Kidney Failure <15      Magnesium [[id number]]  (Normal) Collected: 11/11/21 0116    Specimen: Blood Updated: 11/11/21 0202     Magnesium 1.9 mg/dL     Hemoglobin & Hematocrit, Blood [[id number]]  (Abnormal) Collected: 11/11/21 0116    Specimen: Blood Updated: 11/11/21 0149      "Hemoglobin 7.8 g/dL      Hematocrit 25.7 %     D-dimer, Quantitative [[id number]]  (Abnormal) Collected: 11/10/21 2123    Specimen: Blood Updated: 11/11/21 0023     D-Dimer, Quantitative 1.10 mg/L (FEU)     Narrative:      Reference Range  --------------------------------------------------------------------     < 0.50   Negative Predictive Value  0.50-0.59   Indeterminate    >= 0.60   Probable VTE             A very low percentage of patients with DVT may yield D-Dimer results   below the cut-off of 0.50 mg/L FEU.  This is known to be more   prevalent in patients with distal DVT.             Results of this test should always be interpreted in conjunction with   the patient's medical history, clinical presentation and other   findings.  Clinical diagnosis should not be based on the result of   [place] D-Dimer alone.    aPTT [[id number]]  (Normal) Collected: 11/10/21 2123    Specimen: Blood Updated: 11/10/21 2237     PTT 24.1 seconds     Protime-INR [[id number]]  (Normal) Collected: 11/10/21 1511    Specimen: Blood Updated: 11/10/21 2135     Protime 10.5 Seconds      INR 0.94    Procalcitonin [[id number]]  (Normal) Collected: 11/10/21 1952    Specimen: Blood Updated: 11/10/21 2048     Procalcitonin 0.06 ng/mL     Narrative:      As a Marker for Sepsis (Non-Neonates):     1. <0.5 ng/mL represents a low risk of severe sepsis and/or septic shock.  2. >2 ng/mL represents a high risk of severe sepsis and/or septic shock.    As a Marker for Lower Respiratory Tract Infections that require antibiotic therapy:  PCT on Admission     Antibiotic Therapy             6-12 Hrs later  >0.5                          Strongly Recommended            >0.25 - <0.5             Recommended  0.1 - 0.25                  Discouraged                       Remeasure/reassess PCT  <0.1                         Strongly Discouraged         Remeasure/reassess PCT      As 28 day mortality risk marker: \"Change in Procalcitonin Result\" (>80% or <=80%) if " Day 0 (or Day 1) and Day 4 values are available. Refer to http://[email or web address]-pct-[email or web address]/    Change in PCT <=80 %   A decrease of PCT levels below or equal to 80% defines a positive change in PCT test result representing a higher risk for 28-day all-cause mortality of patients diagnosed with severe sepsis or septic shock.    Change in PCT >80 %   A decrease of PCT levels of more than 80% defines a negative change in PCT result representing a lower risk for 28-day all-cause mortality of patients diagnosed with severe sepsis or septic shock.                Troponin [[id number]]  (Abnormal) Collected: 11/10/21 1952    Specimen: Blood Updated: 11/10/21 2044     Troponin T 0.799 ng/mL     Narrative:      Troponin T Reference Range:  <= 0.03 ng/mL-   Negative for AMI  >0.03 ng/mL-     Abnormal for myocardial necrosis.  Clinicians would have to utilize clinical acumen, EKG, Troponin and serial changes to determine if it is an Acute Myocardial Infarction or myocardial injury due to an underlying chronic condition.       Results may be falsely decreased if patient taking Biotin.      Phosphorus [[id number]]  (Normal) Collected: 11/10/21 1952    Specimen: Blood Updated: 11/10/21 2039     Phosphorus 3.3 mg/dL     Magnesium [[id number]]  (Normal) Collected: 11/10/21 1952    Specimen: Blood Updated: 11/10/21 2039     Magnesium 1.9 mg/dL     BNP [[id number]]  (Abnormal) Collected: 11/10/21 1511    Specimen: Blood Updated: 11/10/21 1742     proBNP 1,187.0 pg/mL     Narrative:      Among patients with dyspnea, NT-proBNP is highly sensitive for the detection of acute congestive heart failure. In addition NT-proBNP of <300 pg/ml effectively rules out acute congestive heart failure with 99% negative predictive value.    Results may be falsely decreased if patient taking Biotin.      Extra Tubes [[id number]] Collected: 11/10/21 1511    Specimen: Blood Updated: 11/10/21 1617    Narrative:      The following orders were created for  panel order Extra Tubes.  Procedure                               Abnormality         Status                     ---------                               -----------         ------                     Gold Top - SST[[id number]]                                   Final result               Light Blue Top[[id number]]                                   Final result                 Please view results for these tests on the individual orders.    Gold Top - SST [[id number]] Collected: 11/10/21 1511    Specimen: Blood Updated: 11/10/21 1617     Extra Tube Hold for add-ons.     Comment: Auto resulted.       Light Blue Top [[id number]] Collected: 11/10/21 1511    Specimen: Blood Updated: 11/10/21 1617     Extra Tube hold for add-on     Comment: Auto resulted       CBC & Differential [[id number]]  (Abnormal) Collected: 11/10/21 1511    Specimen: Blood Updated: 11/10/21 1602    Narrative:      The following orders were created for panel order CBC & Differential.  Procedure                               Abnormality         Status                     ---------                               -----------         ------                     CBC Auto Differential[[id number]]        Abnormal            Final result               Scan Slide[[id number]]                                       Final result                 Please view results for these tests on the individual orders.    CBC Auto Differential [[id number]]  (Abnormal) Collected: 11/10/21 1511    Specimen: Blood Updated: 11/10/21 1602     WBC 6.40 10*3/mm3      RBC 3.46 10*6/mm3      Hemoglobin 6.2 g/dL      Hematocrit 20.7 %      MCV 59.9 fL      MCH 18.0 pg      MCHC 30.0 g/dL      RDW 20.7 %      RDW-SD 43.8 fl      MPV 7.1 fL      Platelets 484 10*3/mm3      Neutrophil % 67.5 %      Lymphocyte % 18.2 %      Monocyte % 10.8 %      Eosinophil % 1.6 %      Basophil % 1.9 %      Neutrophils, Absolute 4.30 10*3/mm3      Lymphocytes, Absolute 1.20 10*3/mm3      Monocytes, Absolute 0.70  10*3/mm3      Eosinophils, Absolute 0.10 10*3/mm3      Basophils, Absolute 0.10 10*3/mm3      nRBC 0.1 /100 WBC     Narrative:      Appended report. These results have been appended to a previously verified report.    Scan Slide [[id number]] Collected: 11/10/21 1511    Specimen: Blood Updated: 11/10/21 1602     Microcytes Mod/2+     WBC Morphology Normal     Platelet Morphology Normal    Respiratory Panel PCR w/COVID-19(SARS-CoV-2) MIRIAM/JENNY/CELINE/PAD/COR/MAD/[facility] In-House, NP Swab in UTM/VTM, 3-4 HR TAT - Swab, Nasopharynx [[id number]]  (Normal) Collected: 11/10/21 1501    Specimen: Swab from Nasopharynx Updated: 11/10/21 1559     ADENOVIRUS, PCR Not Detected     Coronavirus 229E Not Detected     Coronavirus HKU1 Not Detected     Coronavirus NL63 Not Detected     Coronavirus OC43 Not Detected     COVID19 Not Detected     Human Metapneumovirus Not Detected     Human Rhinovirus/Enterovirus Not Detected     Influenza A PCR Not Detected     Influenza B PCR Not Detected     Parainfluenza Virus 1 Not Detected     Parainfluenza Virus 2 Not Detected     Parainfluenza Virus 3 Not Detected     Parainfluenza Virus 4 Not Detected     RSV, PCR Not Detected     Bordetella pertussis pcr Not Detected     Bordetella parapertussis PCR Not Detected     Chlamydophila pneumoniae PCR Not Detected     Mycoplasma pneumo by PCR Not Detected    Narrative:      In the setting of a positive respiratory panel with a viral infection PLUS a negative procalcitonin without other underlying concern for bacterial infection, consider observing off antibiotics or discontinuation of antibiotics and continue supportive care. If the respiratory panel is positive for atypical bacterial infection (Bordetella pertussis, Chlamydophila pneumoniae, or Mycoplasma pneumoniae), consider antibiotic de-escalation to target atypical bacterial infection.    Troponin [[id number]]  (Abnormal) Collected: 11/10/21 1511    Specimen: Blood Updated: 11/10/21 1548     Troponin T  0.899 ng/mL     Narrative:      Troponin T Reference Range:  <= 0.03 ng/mL-   Negative for AMI  >0.03 ng/mL-     Abnormal for myocardial necrosis.  Clinicians would have to utilize clinical acumen, EKG, Troponin and serial changes to determine if it is an Acute Myocardial Infarction or myocardial injury due to an underlying chronic condition.       Results may be falsely decreased if patient taking Biotin.      Basic Metabolic Panel [[id number]]  (Abnormal) Collected: 11/10/21 1511    Specimen: Blood Updated: 11/10/21 1542     Glucose 96 mg/dL      BUN 17 mg/dL      Creatinine 1.22 mg/dL      Sodium 138 mmol/L      Potassium 4.3 mmol/L      Chloride 104 mmol/L      CO2 24.0 mmol/L      Calcium 8.0 mg/dL      eGFR Non African Amer 44 mL/min/1.73      BUN/Creatinine Ratio 13.9     Anion Gap 10.0 mmol/L     Narrative:      GFR Normal >60  Chronic Kidney Disease <60  Kidney Failure <15            Imaging Results (Last 24 Hours)     Procedure Component Value Units Date/Time    CT Abdomen Pelvis Without Contrast [[id number]] Collected: 11/11/21 0002     Updated: 11/11/21 0009    Narrative:      EXAM: CT OF THE ABDOMEN AND PELVIS WITHOUT IV CONTRAST    INDICATIONS: Abdominal distention    TECHNIQUE: CT of the abdomen and pelvis was performed without the administration of intravenous or oral contrast. CT dose lowering techniques were used, to include: automated exposure control, adjustment for patient size, and / or use of iterative   reconstruction.     COMPARISON: CT angiogram of the chest from the same day    FINDINGS:  Bones: No destructive osseous lesions.    Lung bases: Again demonstrated is marked fluid distention of the included esophagus.    ABDOMEN:  Liver: Hypoattenuating structures in the liver are too small to characterize.    Gallbladder and Bile Ducts: Unremarkable CT appearance of the gallbladder. There is no intrahepatic or extrahepatic biliary ductal dilatation.    Spleen: Unremarkable in noncontrast  appearance.    Pancreas: The pancreatic parenchyma is unremarkable. There is no pancreatic ductal dilatation.    Adrenals: Unremarkable without nodularity.    Kidneys: There is recently administered contrast in both collecting systems. There is no hydroureteronephrosis. A cyst arises from the upper pole of the right kidney.    Vasculature: Scattered atherosclerotic calcifications are present. There is no abdominal aortic aneurysm.     Nodes: There is no lymphadenopathy by size criteria.    Bowel: Status post lap band placement. The tubing appears intact. The appendix is not identified, however there are no inflammatory changes at the cecal base.     Mesentery/Peritoneum: Unremarkable    Soft Tissues: Unremarkable    PELVIS:  Pelvic Organs: The uterus is not visualized, consistent with hysterectomy. No abnormal adnexal masses. The urinary bladder contains recently administered contrast.        Impression:      1. Marked fluid distention of the included esophagus, status post lap band placement. This suggests an abnormally tight band resulting in obstruction. Given the degree of esophageal distention, some degree of chronicity is likely present.  2. There is otherwise no evidence of acute disease in the abdomen or pelvis.  3. Evidence of recent contrast administration.    Electronically signed by:  German Gardner M.D.    11/10/2021 10:08 PM    CT Chest Pulmonary Embolism [[id number]] Collected: 11/10/21 2113     Updated: 11/10/21 2123    Narrative:         DATE OF EXAM:  11/10/2021 8:50 PM     PROCEDURE:  CT CHEST PULMONARY EMBOLISM-     INDICATIONS:   Shortness of breath, anemia, elevated d-dimer level.     COMPARISON:   No Comparisons Available     TECHNIQUE:  Routine transaxial slices were obtained through the chest after the  intravenous administration of 100 mL of Isovue 370. Reconstructed  coronal and sagittal images were also obtained. Automated exposure  control and iterative construction methods were used.         FINDINGS:  There are no filling defects within the pulmonary arteries to indicate  acute pulmonary embolic disease. The esophagus is markedly dilated and  fluid-filled all the way to the level of thoracic inlet. The patient has  had a previous gastric lap band procedure. This suggests that the  gastric lap band may have too much tension obstructing the  gastroesophageal junction. There is no airspace disease to indicate  aspiration pneumonia. There are no layering pleural effusions. The heart  size is normal. There are atherosclerotic vascular calcifications  including involvement of the coronary arteries. There are no enlarged  hilar or mediastinal lymph nodes. The patient may have mild hepatic  steatosis. There is suggestion of either a small cyst or cavernous  hemangioma in the lateral segment of the left lobe of the liver  measuring 9 mm. There are no acute findings beneath the hemidiaphragms.  There are no suspicious osteolytic or sclerotic lesions within the bony  structures.        Impression:         1. No acute pulmonary embolic disease.  2. The esophagus is markedly dilated and fluid-filled to the level of  the thoracic inlet. The patient has had a previous gastric lap band  procedure. This suggests that the gastric lap band may have too much  tension obstructing the gastroesophageal junction.  3. No active pulmonary disease.  4. Additional incidental findings as noted above.     Electronically Signed By-Car Jackson MD On:11/10/2021 9:16 PM  This report was finalized on [id number] by  Car Jackson MD.          Results Review:    I reviewed the patient's new clinical results.  I reviewed the patient's new imaging results and agree with the interpretation.    Assessment/Plan       Symptomatic anemia    Dyspnea    Elevated troponin    Elevated d-dimer    History of COVID-19    66-year-old lady presents with dyspnea.  She was also found to have elevated troponin.  She has history of lap band and a  severely dilated esophagus on CT scan of the chest.  This esophagus is so severely dilated I believe it could be actually interfering with her cardiopulmonary function.    At the bedside I was able to remove 4 cc from the band.  Hopefully this will improve her ability to tolerate p.o.  We will start full liquid diet.    Cardiology has ordered an echocardiogram.    She has received a blood transfusion.    I think it is most likely that this is iron deficiency anemia.  I will also order vitamin B12 and vitamin B6 levels as well as other vitamin levels as she likely has multiple vitamin deficiencies.    After cardiology is finished with work-up upper and lower endoscopy would be indicated.  There is a possibility the band has eroded into the stomach and it would be good to rule that out and rule out any other source of blood loss.    Sapna Hidalgo MD  11/11/21  11:54 EST

## 2021-11-11 NOTE — CONSULTS
Referring Provider: Rashad Whitfield DO  Reason for Consultation:  Shortness of breath.  Anemia    Patient Care Team:  Ellen Red APRN as PCP - General (Nurse Practitioner)    Chief complaint  Shortness of breath    Subjective .     History of present illness:  Duyen Odom is a 66 y.o. female who presents with history of hypertension dyslipidemia was admitted to the hospital with progressively worsening shortness of breath for last couple weeks.  Denies having any chest discomfort heaviness or tightness in the chest.  Denies having any fever cough chills.  Denies having any abdominal pain blood in the stool black stools.  Shortness of breath did not improve and came to the emergency room and was found to have significant anemia with hemoglobin of 6.2.  No other associated aggravating or alleviating factors.  Patient denies having any chest discomfort.  Cardiology consultation was requested..  Symptoms are moderate to significant in nature.  Somewhat constant and progressively worsening.         ROS      Patient is not having any chest discomfort palpitations, dizziness or syncope.  Denies having any headache ,abdominal pain ,nausea, vomiting , diarrhea constipation, loss of weight or loss of appetite.  Denies having any excessive bruising ,hematuria or blood in the stool.    Review of all systems negative except as indicated      History  Past Medical History:   Diagnosis Date   • Anemia    • Chest tightness    • Elevated troponin    • High platelet count    • Hyperlipidemia    • Hypertension    • Positive D-dimer        Past Surgical History:   Procedure Laterality Date   • ABDOMINAL SURGERY      lap band   •  SECTION     • HYSTERECTOMY     • LAPAROSCOPIC GASTRIC BANDING         Family History   Problem Relation Age of Onset   • Mental illness Mother    • Hypertension Mother    • Diabetes Father    • Heart disease Father        Social History     Tobacco Use   • Smoking status: Never Smoker   •  "Smokeless tobacco: Never Used   Vaping Use   • Vaping Use: Never used   Substance Use Topics   • Alcohol use: Yes     Comment: occ.   • Drug use: Never        Medications Prior to Admission   Medication Sig Dispense Refill Last Dose   • azithromycin (Zithromax Z-Aneesh) 250 MG tablet Take 2 tablets by mouth on day 1, then 1 tablet daily on days 2-5 6 tablet 0 11/10/2021 at Unknown time   • benzonatate (Tessalon Perles) 100 MG capsule Take 1 capsule by mouth 3 (Three) Times a Day As Needed for Cough. 30 capsule 1 11/10/2021 at Unknown time   • predniSONE (DELTASONE) 10 MG (48) dose pack Take as directed on pack 1 each 0 11/10/2021 at Unknown time         Iodinated diagnostic agents    Scheduled Meds:pantoprazole, 40 mg, Intravenous, Q12H  sodium chloride, 10 mL, Intravenous, Q12H      Continuous Infusions:   PRN Meds:.•  acetaminophen **OR** acetaminophen **OR** acetaminophen  •  Calcium Gluconate-NaCl **AND** calcium gluconate **AND** Calcium, Ionized  •  magnesium sulfate **OR** magnesium sulfate **OR** magnesium sulfate  •  nitroglycerin  •  ondansetron **OR** ondansetron  •  [COMPLETED] Insert peripheral IV **AND** sodium chloride  •  sodium chloride    Objective     VITAL SIGNS  Vitals:    11/10/21 1948 11/10/21 2106 11/10/21 2308 11/11/21 0408   BP: 143/84 150/81 150/91 109/72   BP Location:  Left arm Left arm Left arm   Patient Position:  Sitting Lying Lying   Pulse: 81 93 102 81   Resp: 20 17 17 18   Temp: 98.8 °F (37.1 °C) 98.5 °F (36.9 °C) 98.1 °F (36.7 °C) 97.5 °F (36.4 °C)   TempSrc: Oral Oral Oral Oral   SpO2: 98% 96% 98% 95%   Weight:    60.1 kg (132 lb 7.9 oz)   Height:           Flowsheet Rows      First Filed Value   Admission Height 152.4 cm (60\") Documented at 11/10/2021 1436   Admission Weight 58.1 kg (128 lb 1.4 oz) Documented at 11/10/2021 1436            Intake/Output Summary (Last 24 hours) at 11/11/2021 0652  Last data filed at 11/10/2021 2308  Gross per 24 hour   Intake 1540 ml   Output -- "   Net 1540 ml        TELEMETRY: Sinus rhythm    Physical Exam:  The patient is alert, oriented and in no distress.  Vital signs as noted above.  Head and neck revealed no carotid bruits or jugular venous distention.  No thyromegaly or lymph adenopathy is present  Lungs clear.  No wheezing.  Breath sounds are normal bilaterally.  Heart normal first and second heart sounds.No murmur.  No precordial rub is present.  No gallop is present.  Abdomen soft and nontender.  No organomegaly is present.  Extremities with good peripheral pulses without any pedal edema.  Skin warm and dry.  Musculoskeletal system is grossly normal  CNS grossly normal      Results Review:   I reviewed the patient's new clinical results.  Lab Results (last 24 hours)     Procedure Component Value Units Date/Time    Troponin [[id number]]  (Abnormal) Collected: 11/11/21 0116    Specimen: Blood Updated: 11/11/21 0207     Troponin T 0.499 ng/mL     Narrative:      Troponin T Reference Range:  <= 0.03 ng/mL-   Negative for AMI  >0.03 ng/mL-     Abnormal for myocardial necrosis.  Clinicians would have to utilize clinical acumen, EKG, Troponin and serial changes to determine if it is an Acute Myocardial Infarction or myocardial injury due to an underlying chronic condition.       Results may be falsely decreased if patient taking Biotin.      Basic Metabolic Panel [[id number]]  (Abnormal) Collected: 11/11/21 0116    Specimen: Blood Updated: 11/11/21 0202     Glucose 156 mg/dL      BUN 15 mg/dL      Creatinine 0.93 mg/dL      Sodium 139 mmol/L      Potassium 4.3 mmol/L      Chloride 107 mmol/L      CO2 21.0 mmol/L      Calcium 7.8 mg/dL      eGFR Non African Amer 61 mL/min/1.73      BUN/Creatinine Ratio 16.1     Anion Gap 11.0 mmol/L     Narrative:      GFR Normal >60  Chronic Kidney Disease <60  Kidney Failure <15      Magnesium [[id number]]  (Normal) Collected: 11/11/21 0116    Specimen: Blood Updated: 11/11/21 0202     Magnesium 1.9 mg/dL     Hemoglobin &  Hematocrit, Blood [[id number]]  (Abnormal) Collected: 11/11/21 0116    Specimen: Blood Updated: 11/11/21 0149     Hemoglobin 7.8 g/dL      Hematocrit 25.7 %     D-dimer, Quantitative [[id number]]  (Abnormal) Collected: 11/10/21 2123    Specimen: Blood Updated: 11/11/21 0023     D-Dimer, Quantitative 1.10 mg/L (FEU)     Narrative:      Reference Range  --------------------------------------------------------------------     < 0.50   Negative Predictive Value  0.50-0.59   Indeterminate    >= 0.60   Probable VTE             A very low percentage of patients with DVT may yield D-Dimer results   below the cut-off of 0.50 mg/L FEU.  This is known to be more   prevalent in patients with distal DVT.             Results of this test should always be interpreted in conjunction with   the patient's medical history, clinical presentation and other   findings.  Clinical diagnosis should not be based on the result of   [place] D-Dimer alone.    aPTT [[id number]]  (Normal) Collected: 11/10/21 2123    Specimen: Blood Updated: 11/10/21 2237     PTT 24.1 seconds     Protime-INR [[id number]]  (Normal) Collected: 11/10/21 1511    Specimen: Blood Updated: 11/10/21 2135     Protime 10.5 Seconds      INR 0.94    Procalcitonin [[id number]]  (Normal) Collected: 11/10/21 1952    Specimen: Blood Updated: 11/10/21 2048     Procalcitonin 0.06 ng/mL     Narrative:      As a Marker for Sepsis (Non-Neonates):     1. <0.5 ng/mL represents a low risk of severe sepsis and/or septic shock.  2. >2 ng/mL represents a high risk of severe sepsis and/or septic shock.    As a Marker for Lower Respiratory Tract Infections that require antibiotic therapy:  PCT on Admission     Antibiotic Therapy             6-12 Hrs later  >0.5                          Strongly Recommended            >0.25 - <0.5             Recommended  0.1 - 0.25                  Discouraged                       Remeasure/reassess PCT  <0.1                         Strongly Discouraged          "Remeasure/reassess PCT      As 28 day mortality risk marker: \"Change in Procalcitonin Result\" (>80% or <=80%) if Day 0 (or Day 1) and Day 4 values are available. Refer to http://www.[facility]-pct-calculator.com/    Change in PCT <=80 %   A decrease of PCT levels below or equal to 80% defines a positive change in PCT test result representing a higher risk for 28-day all-cause mortality of patients diagnosed with severe sepsis or septic shock.    Change in PCT >80 %   A decrease of PCT levels of more than 80% defines a negative change in PCT result representing a lower risk for 28-day all-cause mortality of patients diagnosed with severe sepsis or septic shock.                Troponin [[id number]]  (Abnormal) Collected: 11/10/21 1952    Specimen: Blood Updated: 11/10/21 2044     Troponin T 0.799 ng/mL     Narrative:      Troponin T Reference Range:  <= 0.03 ng/mL-   Negative for AMI  >0.03 ng/mL-     Abnormal for myocardial necrosis.  Clinicians would have to utilize clinical acumen, EKG, Troponin and serial changes to determine if it is an Acute Myocardial Infarction or myocardial injury due to an underlying chronic condition.       Results may be falsely decreased if patient taking Biotin.      Phosphorus [[id number]]  (Normal) Collected: 11/10/21 1952    Specimen: Blood Updated: 11/10/21 2039     Phosphorus 3.3 mg/dL     Magnesium [[id number]]  (Normal) Collected: 11/10/21 1952    Specimen: Blood Updated: 11/10/21 2039     Magnesium 1.9 mg/dL     BNP [[id number]]  (Abnormal) Collected: 11/10/21 1511    Specimen: Blood Updated: 11/10/21 1742     proBNP 1,187.0 pg/mL     Narrative:      Among patients with dyspnea, NT-proBNP is highly sensitive for the detection of acute congestive heart failure. In addition NT-proBNP of <300 pg/ml effectively rules out acute congestive heart failure with 99% negative predictive value.    Results may be falsely decreased if patient taking Biotin.      Extra Tubes [[id number]] Collected: " 11/10/21 1511    Specimen: Blood Updated: 11/10/21 1617    Narrative:      The following orders were created for panel order Extra Tubes.  Procedure                               Abnormality         Status                     ---------                               -----------         ------                     Gold Top - SST[[id number]]                                   Final result               Light Blue Top[[id number]]                                   Final result                 Please view results for these tests on the individual orders.    Gold Top - SST [[id number]] Collected: 11/10/21 1511    Specimen: Blood Updated: 11/10/21 1617     Extra Tube Hold for add-ons.     Comment: Auto resulted.       Light Blue Top [[id number]] Collected: 11/10/21 1511    Specimen: Blood Updated: 11/10/21 1617     Extra Tube hold for add-on     Comment: Auto resulted       CBC & Differential [[id number]]  (Abnormal) Collected: 11/10/21 1511    Specimen: Blood Updated: 11/10/21 1602    Narrative:      The following orders were created for panel order CBC & Differential.  Procedure                               Abnormality         Status                     ---------                               -----------         ------                     CBC Auto Differential[[id number]]        Abnormal            Final result               Scan Slide[[id number]]                                       Final result                 Please view results for these tests on the individual orders.    CBC Auto Differential [[id number]]  (Abnormal) Collected: 11/10/21 1511    Specimen: Blood Updated: 11/10/21 1602     WBC 6.40 10*3/mm3      RBC 3.46 10*6/mm3      Hemoglobin 6.2 g/dL      Hematocrit 20.7 %      MCV 59.9 fL      MCH 18.0 pg      MCHC 30.0 g/dL      RDW 20.7 %      RDW-SD 43.8 fl      MPV 7.1 fL      Platelets 484 10*3/mm3      Neutrophil % 67.5 %      Lymphocyte % 18.2 %      Monocyte % 10.8 %      Eosinophil % 1.6 %      Basophil % 1.9 %       Neutrophils, Absolute 4.30 10*3/mm3      Lymphocytes, Absolute 1.20 10*3/mm3      Monocytes, Absolute 0.70 10*3/mm3      Eosinophils, Absolute 0.10 10*3/mm3      Basophils, Absolute 0.10 10*3/mm3      nRBC 0.1 /100 WBC     Narrative:      Appended report. These results have been appended to a previously verified report.    Scan Slide [[id number]] Collected: 11/10/21 1511    Specimen: Blood Updated: 11/10/21 1602     Microcytes Mod/2+     WBC Morphology Normal     Platelet Morphology Normal    Respiratory Panel PCR w/COVID-19(SARS-CoV-2) MIRIAM/JENNY/CELINE/PAD/[facility] In-House, NP Swab in UTM/VTM, 3-4 HR TAT - Swab, Nasopharynx [[id number]]  (Normal) Collected: 11/10/21 1501    Specimen: Swab from Nasopharynx Updated: 11/10/21 1559     ADENOVIRUS, PCR Not Detected     Coronavirus 229E Not Detected     Coronavirus HKU1 Not Detected     Coronavirus NL63 Not Detected     Coronavirus OC43 Not Detected     COVID19 Not Detected     Human Metapneumovirus Not Detected     Human Rhinovirus/Enterovirus Not Detected     Influenza A PCR Not Detected     Influenza B PCR Not Detected     Parainfluenza Virus 1 Not Detected     Parainfluenza Virus 2 Not Detected     Parainfluenza Virus 3 Not Detected     Parainfluenza Virus 4 Not Detected     RSV, PCR Not Detected     Bordetella pertussis pcr Not Detected     Bordetella parapertussis PCR Not Detected     Chlamydophila pneumoniae PCR Not Detected     Mycoplasma pneumo by PCR Not Detected    Narrative:      In the setting of a positive respiratory panel with a viral infection PLUS a negative procalcitonin without other underlying concern for bacterial infection, consider observing off antibiotics or discontinuation of antibiotics and continue supportive care. If the respiratory panel is positive for atypical bacterial infection (Bordetella pertussis, Chlamydophila pneumoniae, or Mycoplasma pneumoniae), consider antibiotic de-escalation to target atypical bacterial infection.     Troponin [[id number]]  (Abnormal) Collected: 11/10/21 1511    Specimen: Blood Updated: 11/10/21 1548     Troponin T 0.899 ng/mL     Narrative:      Troponin T Reference Range:  <= 0.03 ng/mL-   Negative for AMI  >0.03 ng/mL-     Abnormal for myocardial necrosis.  Clinicians would have to utilize clinical acumen, EKG, Troponin and serial changes to determine if it is an Acute Myocardial Infarction or myocardial injury due to an underlying chronic condition.       Results may be falsely decreased if patient taking Biotin.      Basic Metabolic Panel [[id number]]  (Abnormal) Collected: 11/10/21 1511    Specimen: Blood Updated: 11/10/21 1542     Glucose 96 mg/dL      BUN 17 mg/dL      Creatinine 1.22 mg/dL      Sodium 138 mmol/L      Potassium 4.3 mmol/L      Chloride 104 mmol/L      CO2 24.0 mmol/L      Calcium 8.0 mg/dL      eGFR Non African Amer 44 mL/min/1.73      BUN/Creatinine Ratio 13.9     Anion Gap 10.0 mmol/L     Narrative:      GFR Normal >60  Chronic Kidney Disease <60  Kidney Failure <15            Imaging Results (Last 24 Hours)     Procedure Component Value Units Date/Time    CT Abdomen Pelvis Without Contrast [[id number]] Collected: 11/11/21 0002     Updated: 11/11/21 0009    Narrative:      EXAM: CT OF THE ABDOMEN AND PELVIS WITHOUT IV CONTRAST    INDICATIONS: Abdominal distention    TECHNIQUE: CT of the abdomen and pelvis was performed without the administration of intravenous or oral contrast. CT dose lowering techniques were used, to include: automated exposure control, adjustment for patient size, and / or use of iterative   reconstruction.     COMPARISON: CT angiogram of the chest from the same day    FINDINGS:  Bones: No destructive osseous lesions.    Lung bases: Again demonstrated is marked fluid distention of the included esophagus.    ABDOMEN:  Liver: Hypoattenuating structures in the liver are too small to characterize.    Gallbladder and Bile Ducts: Unremarkable CT appearance of the gallbladder.  There is no intrahepatic or extrahepatic biliary ductal dilatation.    Spleen: Unremarkable in noncontrast appearance.    Pancreas: The pancreatic parenchyma is unremarkable. There is no pancreatic ductal dilatation.    Adrenals: Unremarkable without nodularity.    Kidneys: There is recently administered contrast in both collecting systems. There is no hydroureteronephrosis. A cyst arises from the upper pole of the right kidney.    Vasculature: Scattered atherosclerotic calcifications are present. There is no abdominal aortic aneurysm.     Nodes: There is no lymphadenopathy by size criteria.    Bowel: Status post lap band placement. The tubing appears intact. The appendix is not identified, however there are no inflammatory changes at the cecal base.     Mesentery/Peritoneum: Unremarkable    Soft Tissues: Unremarkable    PELVIS:  Pelvic Organs: The uterus is not visualized, consistent with hysterectomy. No abnormal adnexal masses. The urinary bladder contains recently administered contrast.        Impression:      1. Marked fluid distention of the included esophagus, status post lap band placement. This suggests an abnormally tight band resulting in obstruction. Given the degree of esophageal distention, some degree of chronicity is likely present.  2. There is otherwise no evidence of acute disease in the abdomen or pelvis.  3. Evidence of recent contrast administration.    Electronically signed by:  German Gardner M.D.    11/10/2021 10:08 PM    CT Chest Pulmonary Embolism [[id number]] Collected: 11/10/21 2113     Updated: 11/10/21 2123    Narrative:         DATE OF EXAM:  11/10/2021 8:50 PM     PROCEDURE:  CT CHEST PULMONARY EMBOLISM-     INDICATIONS:   Shortness of breath, anemia, elevated d-dimer level.     COMPARISON:   No Comparisons Available     TECHNIQUE:  Routine transaxial slices were obtained through the chest after the  intravenous administration of 100 mL of Isovue 370. Reconstructed  coronal and  sagittal images were also obtained. Automated exposure  control and iterative construction methods were used.        FINDINGS:  There are no filling defects within the pulmonary arteries to indicate  acute pulmonary embolic disease. The esophagus is markedly dilated and  fluid-filled all the way to the level of thoracic inlet. The patient has  had a previous gastric lap band procedure. This suggests that the  gastric lap band may have too much tension obstructing the  gastroesophageal junction. There is no airspace disease to indicate  aspiration pneumonia. There are no layering pleural effusions. The heart  size is normal. There are atherosclerotic vascular calcifications  including involvement of the coronary arteries. There are no enlarged  hilar or mediastinal lymph nodes. The patient may have mild hepatic  steatosis. There is suggestion of either a small cyst or cavernous  hemangioma in the lateral segment of the left lobe of the liver  measuring 9 mm. There are no acute findings beneath the hemidiaphragms.  There are no suspicious osteolytic or sclerotic lesions within the bony  structures.        Impression:         1. No acute pulmonary embolic disease.  2. The esophagus is markedly dilated and fluid-filled to the level of  the thoracic inlet. The patient has had a previous gastric lap band  procedure. This suggests that the gastric lap band may have too much  tension obstructing the gastroesophageal junction.  3. No active pulmonary disease.  4. Additional incidental findings as noted above.     Electronically Signed By-Car Jackson MD On:11/10/2021 9:16 PM  This report was finalized on [id number] by  Car Jackson MD.      LAB RESULTS (LAST 7 DAYS)    CBC  Results from last 7 days   Lab Units 11/11/21  0116 11/10/21  1511 11/09/21  1534   WBC 10*3/mm3  --  6.40 7.0   RBC 10*6/mm3  --  3.46* 3.82   HEMOGLOBIN g/dL 7.8* 6.2* 6.3*   HEMATOCRIT % 25.7* 20.7* 24.4*   MCV fL  --  59.9* 64*   PLATELETS 10*3/mm3   --  484* 527*       BMP  Results from last 7 days   Lab Units 11/11/21  0116 11/10/21  1952 11/10/21  1511 11/09/21  1534   SODIUM mmol/L 139  --  138 140   POTASSIUM mmol/L 4.3  --  4.3 CANCELED   CHLORIDE mmol/L 107  --  104 103   TOTAL CO2 mmol/L  --   --   --  20   CO2 mmol/L 21.0*  --  24.0  --    BUN mg/dL 15  --  17 17   CREATININE mg/dL 0.93  --  1.22* 1.34*   GLUCOSE mg/dL 156*  --  96 CANCELED   MAGNESIUM mg/dL 1.9 1.9  --   --    PHOSPHORUS mg/dL  --  3.3  --   --        CMP   Results from last 7 days   Lab Units 11/11/21  0116 11/10/21  1511 11/09/21  1534   SODIUM mmol/L 139 138 140   POTASSIUM mmol/L 4.3 4.3 CANCELED   CHLORIDE mmol/L 107 104 103   TOTAL CO2 mmol/L  --   --  20   CO2 mmol/L 21.0* 24.0  --    BUN mg/dL 15 17 17   CREATININE mg/dL 0.93 1.22* 1.34*   GLUCOSE mg/dL 156* 96 CANCELED   ALBUMIN g/dL  --   --  3.8   BILIRUBIN mg/dL  --   --  0.2   ALK PHOS IU/L  --   --  82   AST (SGOT) IU/L  --   --  20   ALT (SGPT) IU/L  --   --  9         BNP  Results from last 7 days   Lab Units 11/09/21  1534   BNP pg/mL 96.3       TROPONIN  Results from last 7 days   Lab Units 11/11/21  0116   TROPONIN T ng/mL 0.499*       CoAg  Results from last 7 days   Lab Units 11/10/21  2123 11/10/21  1511   INR   --  0.94   APTT seconds 24.1  --        Creatinine Clearance  Estimated Creatinine Clearance: 48.8 mL/min (by C-G formula based on SCr of 0.93 mg/dL).    ABG        Radiology  CT Abdomen Pelvis Without Contrast    Result Date: 11/11/2021  1. Marked fluid distention of the included esophagus, status post lap band placement. This suggests an abnormally tight band resulting in obstruction. Given the degree of esophageal distention, some degree of chronicity is likely present. 2. There is otherwise no evidence of acute disease in the abdomen or pelvis. 3. Evidence of recent contrast administration. Electronically signed by:  German Gardner M.D.  11/10/2021 10:08 PM    CT Chest Pulmonary Embolism    Result  Date: 11/10/2021   1. No acute pulmonary embolic disease. 2. The esophagus is markedly dilated and fluid-filled to the level of the thoracic inlet. The patient has had a previous gastric lap band procedure. This suggests that the gastric lap band may have too much tension obstructing the gastroesophageal junction. 3. No active pulmonary disease. 4. Additional incidental findings as noted above.  Electronically Signed By-Car Jackson MD On:11/10/2021 9:16 PM This report was finalized on [id number] by  Car Jackson MD.    XR Chest PA & Lateral    Result Date: 2021  No acute chest finding.  Electronically Signed By-Edel Glass MD On:2021 4:14 PM This report was finalized on [id number] by  Edel Glass MD.              EKG                I personally viewed and interpreted the patient's EKG/Telemetry data: Normal sinus rhythm normal ECG    ECHOCARDIOGRAM:                Cardiolite (Tc-99m Sestamibi) stress test      OTHER:     Assessment/Plan     Principal Problem:    Symptomatic anemia  Active Problems:    Dyspnea    Elevated troponin    Elevated d-dimer    History of COVID-19  ]]]]]]]]]]]]]]]]]]]]  Impression  ===========  -Progressively worsening shortness of breath.    -Elevated troponin level  0.899  0.799 0.499    -Hypertension dyslipidemia    -Significant anemia with hemoglobin of 6.2  No obvious GI symptoms.    -Status post lap band surgery.  CT scan of the chest revealed markedly dilated esophagus with fluid-filled to the thoracic inlet.  Status post  section hysterectomy    -Elevated D-dimer  CT with PE protocol is negative for pulmonary embolus.    -Family history is positive for coronary artery disease.    -Non-smoker    -Allergic to iodine.  ==============  Plan  =========  Patient has progressively worsening shortness of breath.  His symptoms could be from significant anemia with hemoglobin of 6.2.  Patient does not have typical symptoms of angina pectoris.  However troponin  level is elevated and trending down.  Concerning for coronary artery disease.  We will trend her troponin levels.  Patient will benefit from ischemic cardiac work-up echocardiogram  Stress Cardiolite test versus cardiac catheterization depending on patient's progression troponin levels.  Patient would need work-up for anemia to establish cause try to treat it.  Further plan will depend on patient's progress.  ]]]]]]]]]]]]]]]]]]]      Vanita Chris MD  11/11/21  06:52 EST

## 2021-11-12 ENCOUNTER — INPATIENT HOSPITAL (OUTPATIENT)
Dept: URBAN - METROPOLITAN AREA HOSPITAL 84 | Facility: HOSPITAL | Age: 66
End: 2021-11-12

## 2021-11-12 ENCOUNTER — ANESTHESIA EVENT (OUTPATIENT)
Dept: GASTROENTEROLOGY | Facility: HOSPITAL | Age: 66
End: 2021-11-12

## 2021-11-12 ENCOUNTER — ANESTHESIA (OUTPATIENT)
Dept: GASTROENTEROLOGY | Facility: HOSPITAL | Age: 66
End: 2021-11-12

## 2021-11-12 DIAGNOSIS — D50.9 IRON DEFICIENCY ANEMIA, UNSPECIFIED: ICD-10-CM

## 2021-11-12 DIAGNOSIS — Z98.84 BARIATRIC SURGERY STATUS: ICD-10-CM

## 2021-11-12 DIAGNOSIS — K20.80 OTHER ESOPHAGITIS WITHOUT BLEEDING: ICD-10-CM

## 2021-11-12 PROBLEM — I21.4 NON-STEMI (NON-ST ELEVATED MYOCARDIAL INFARCTION) (HCC): Status: ACTIVE | Noted: 2021-11-10

## 2021-11-12 PROBLEM — R07.89 CHEST TIGHTNESS: Status: ACTIVE | Noted: 2021-11-10

## 2021-11-12 LAB
ALBUMIN SERPL ELPH-MCNC: 2.8 G/DL (ref 2.9–4.4)
ALBUMIN SERPL-MCNC: 3 G/DL (ref 3.5–5.2)
ALBUMIN/GLOB SERPL: 1 {RATIO} (ref 0.7–1.7)
ALBUMIN/GLOB SERPL: 1.3 G/DL
ALP SERPL-CCNC: 77 U/L (ref 39–117)
ALPHA1 GLOB SERPL ELPH-MCNC: 0.3 G/DL (ref 0–0.4)
ALPHA2 GLOB SERPL ELPH-MCNC: 0.7 G/DL (ref 0.4–1)
ALT SERPL W P-5'-P-CCNC: 6 U/L (ref 1–33)
ANION GAP SERPL CALCULATED.3IONS-SCNC: 12 MMOL/L (ref 5–15)
AST SERPL-CCNC: 13 U/L (ref 1–32)
B-GLOBULIN SERPL ELPH-MCNC: 1 G/DL (ref 0.7–1.3)
BASOPHILS # BLD AUTO: 0.1 10*3/MM3 (ref 0–0.2)
BASOPHILS NFR BLD AUTO: 0.7 % (ref 0–1.5)
BH BB BLOOD EXPIRATION DATE: NORMAL
BH BB BLOOD TYPE BARCODE: 5100
BH BB DISPENSE STATUS: NORMAL
BH BB PRODUCT CODE: NORMAL
BH BB UNIT NUMBER: NORMAL
BILIRUB SERPL-MCNC: 0.3 MG/DL (ref 0–1.2)
BUN SERPL-MCNC: 23 MG/DL (ref 8–23)
BUN/CREAT SERPL: 21.7 (ref 7–25)
CALCIUM SPEC-SCNC: 7.6 MG/DL (ref 8.6–10.5)
CHLORIDE SERPL-SCNC: 109 MMOL/L (ref 98–107)
CO2 SERPL-SCNC: 22 MMOL/L (ref 22–29)
CREAT SERPL-MCNC: 1.06 MG/DL (ref 0.57–1)
CROSSMATCH INTERPRETATION: NORMAL
DEPRECATED RDW RBC AUTO: 59.1 FL (ref 37–54)
EOSINOPHIL # BLD AUTO: 0 10*3/MM3 (ref 0–0.4)
EOSINOPHIL NFR BLD AUTO: 0.2 % (ref 0.3–6.2)
ERYTHROCYTE [DISTWIDTH] IN BLOOD BY AUTOMATED COUNT: 27.2 % (ref 12.3–15.4)
FOLATE BLD-MCNC: 325 NG/ML
FOLATE RBC-MCNC: 1182 NG/ML
GAMMA GLOB SERPL ELPH-MCNC: 0.9 G/DL (ref 0.4–1.8)
GFR SERPL CREATININE-BSD FRML MDRD: 52 ML/MIN/1.73
GLOBULIN SER-MCNC: 2.9 G/DL (ref 2.2–3.9)
GLOBULIN UR ELPH-MCNC: 2.3 GM/DL
GLUCOSE SERPL-MCNC: 90 MG/DL (ref 65–99)
HCT VFR BLD AUTO: 25 % (ref 34–46.6)
HCT VFR BLD AUTO: 27.5 % (ref 34–46.6)
HCT VFR BLD AUTO: 29.1 % (ref 34–46.6)
HGB BLD-MCNC: 7.6 G/DL (ref 12–15.9)
HGB BLD-MCNC: 8.7 G/DL (ref 12–15.9)
IGA SERPL-MCNC: 346 MG/DL (ref 87–352)
IGG SERPL-MCNC: 869 MG/DL (ref 586–1602)
IGM SERPL-MCNC: 90 MG/DL (ref 26–217)
INTERPRETATION SERPL IEP-IMP: ABNORMAL
LABORATORY COMMENT REPORT: ABNORMAL
LYMPHOCYTES # BLD AUTO: 1.9 10*3/MM3 (ref 0.7–3.1)
LYMPHOCYTES NFR BLD AUTO: 16.4 % (ref 19.6–45.3)
M PROTEIN SERPL ELPH-MCNC: ABNORMAL G/DL
MAGNESIUM SERPL-MCNC: 2.5 MG/DL (ref 1.6–2.4)
MAGNESIUM SERPL-MCNC: 3.1 MG/DL (ref 1.6–2.4)
MCH RBC QN AUTO: 20 PG (ref 26.6–33)
MCHC RBC AUTO-ENTMCNC: 30.5 G/DL (ref 31.5–35.7)
MCV RBC AUTO: 65.3 FL (ref 79–97)
MONOCYTES # BLD AUTO: 1.1 10*3/MM3 (ref 0.1–0.9)
MONOCYTES NFR BLD AUTO: 9.4 % (ref 5–12)
NEUTROPHILS NFR BLD AUTO: 73.3 % (ref 42.7–76)
NEUTROPHILS NFR BLD AUTO: 8.4 10*3/MM3 (ref 1.7–7)
NRBC BLD AUTO-RTO: 0.1 /100 WBC (ref 0–0.2)
PLATELET # BLD AUTO: 451 10*3/MM3 (ref 140–450)
PMV BLD AUTO: 8.5 FL (ref 6–12)
POTASSIUM SERPL-SCNC: 4.3 MMOL/L (ref 3.5–5.2)
PROT SERPL-MCNC: 5.3 G/DL (ref 6–8.5)
PROT SERPL-MCNC: 5.7 G/DL (ref 6–8.5)
RBC # BLD AUTO: 3.83 10*6/MM3 (ref 3.77–5.28)
SODIUM SERPL-SCNC: 143 MMOL/L (ref 136–145)
TROPONIN T SERPL-MCNC: 0.73 NG/ML (ref 0–0.03)
UNIT  ABO: NORMAL
UNIT  RH: NORMAL
WBC # BLD AUTO: 11.5 10*3/MM3 (ref 3.4–10.8)

## 2021-11-12 PROCEDURE — 93010 ELECTROCARDIOGRAM REPORT: CPT | Performed by: INTERNAL MEDICINE

## 2021-11-12 PROCEDURE — 43235 EGD DIAGNOSTIC BRUSH WASH: CPT | Performed by: INTERNAL MEDICINE

## 2021-11-12 PROCEDURE — 85014 HEMATOCRIT: CPT | Performed by: INTERNAL MEDICINE

## 2021-11-12 PROCEDURE — 93005 ELECTROCARDIOGRAM TRACING: CPT | Performed by: INTERNAL MEDICINE

## 2021-11-12 PROCEDURE — 25010000002 PROPOFOL 200 MG/20ML EMULSION: Performed by: ANESTHESIOLOGY

## 2021-11-12 PROCEDURE — 83735 ASSAY OF MAGNESIUM: CPT | Performed by: INTERNAL MEDICINE

## 2021-11-12 PROCEDURE — 83735 ASSAY OF MAGNESIUM: CPT | Performed by: NURSE PRACTITIONER

## 2021-11-12 PROCEDURE — 99233 SBSQ HOSP IP/OBS HIGH 50: CPT | Performed by: INTERNAL MEDICINE

## 2021-11-12 PROCEDURE — 85025 COMPLETE CBC W/AUTO DIFF WBC: CPT | Performed by: INTERNAL MEDICINE

## 2021-11-12 PROCEDURE — 84484 ASSAY OF TROPONIN QUANT: CPT | Performed by: INTERNAL MEDICINE

## 2021-11-12 PROCEDURE — 80053 COMPREHEN METABOLIC PANEL: CPT | Performed by: NURSE PRACTITIONER

## 2021-11-12 PROCEDURE — 36430 TRANSFUSION BLD/BLD COMPNT: CPT

## 2021-11-12 PROCEDURE — 0DJ08ZZ INSPECTION OF UPPER INTESTINAL TRACT, VIA NATURAL OR ARTIFICIAL OPENING ENDOSCOPIC: ICD-10-PCS | Performed by: INTERNAL MEDICINE

## 2021-11-12 PROCEDURE — 85018 HEMOGLOBIN: CPT | Performed by: INTERNAL MEDICINE

## 2021-11-12 PROCEDURE — 86900 BLOOD TYPING SEROLOGIC ABO: CPT

## 2021-11-12 PROCEDURE — P9016 RBC LEUKOCYTES REDUCED: HCPCS

## 2021-11-12 RX ORDER — SODIUM CHLORIDE 0.9 % (FLUSH) 0.9 %
3 SYRINGE (ML) INJECTION EVERY 12 HOURS SCHEDULED
Status: DISCONTINUED | OUTPATIENT
Start: 2021-11-12 | End: 2021-11-12

## 2021-11-12 RX ORDER — ATORVASTATIN CALCIUM 40 MG/1
40 TABLET, FILM COATED ORAL NIGHTLY
Status: DISCONTINUED | OUTPATIENT
Start: 2021-11-12 | End: 2021-11-14 | Stop reason: HOSPADM

## 2021-11-12 RX ORDER — ONDANSETRON 2 MG/ML
4 INJECTION INTRAMUSCULAR; INTRAVENOUS EVERY 6 HOURS PRN
Status: DISCONTINUED | OUTPATIENT
Start: 2021-11-12 | End: 2021-11-14 | Stop reason: HOSPADM

## 2021-11-12 RX ORDER — SODIUM CHLORIDE 0.9 % (FLUSH) 0.9 %
3-10 SYRINGE (ML) INJECTION AS NEEDED
Status: DISCONTINUED | OUTPATIENT
Start: 2021-11-12 | End: 2021-11-13 | Stop reason: HOSPADM

## 2021-11-12 RX ORDER — SODIUM CHLORIDE 9 MG/ML
50 INJECTION, SOLUTION INTRAVENOUS CONTINUOUS PRN
Status: DISCONTINUED | OUTPATIENT
Start: 2021-11-12 | End: 2021-11-13

## 2021-11-12 RX ORDER — PROPOFOL 10 MG/ML
INJECTION, EMULSION INTRAVENOUS AS NEEDED
Status: DISCONTINUED | OUTPATIENT
Start: 2021-11-12 | End: 2021-11-12 | Stop reason: SURG

## 2021-11-12 RX ORDER — SODIUM CHLORIDE 0.9 % (FLUSH) 0.9 %
3 SYRINGE (ML) INJECTION EVERY 12 HOURS SCHEDULED
Status: DISCONTINUED | OUTPATIENT
Start: 2021-11-12 | End: 2021-11-13 | Stop reason: HOSPADM

## 2021-11-12 RX ORDER — SODIUM CHLORIDE 0.9 % (FLUSH) 0.9 %
3-10 SYRINGE (ML) INJECTION AS NEEDED
Status: DISCONTINUED | OUTPATIENT
Start: 2021-11-12 | End: 2021-11-12

## 2021-11-12 RX ORDER — ONDANSETRON 4 MG/1
4 TABLET, FILM COATED ORAL EVERY 6 HOURS PRN
Status: DISCONTINUED | OUTPATIENT
Start: 2021-11-12 | End: 2021-11-14 | Stop reason: HOSPADM

## 2021-11-12 RX ADMIN — Medication 3 ML: at 20:06

## 2021-11-12 RX ADMIN — PROPOFOL 180 MG: 10 INJECTION, EMULSION INTRAVENOUS at 12:46

## 2021-11-12 RX ADMIN — SODIUM CHLORIDE, PRESERVATIVE FREE 10 ML: 5 INJECTION INTRAVENOUS at 08:49

## 2021-11-12 RX ADMIN — PANTOPRAZOLE SODIUM 40 MG: 40 INJECTION, POWDER, FOR SOLUTION INTRAVENOUS at 20:06

## 2021-11-12 RX ADMIN — SODIUM CHLORIDE, PRESERVATIVE FREE 10 ML: 5 INJECTION INTRAVENOUS at 20:06

## 2021-11-12 RX ADMIN — SODIUM CHLORIDE 50 ML/HR: 9 INJECTION, SOLUTION INTRAVENOUS at 11:53

## 2021-11-12 RX ADMIN — Medication 3 ML: at 08:49

## 2021-11-12 RX ADMIN — PANTOPRAZOLE SODIUM 40 MG: 40 INJECTION, POWDER, FOR SOLUTION INTRAVENOUS at 08:49

## 2021-11-12 RX ADMIN — ATORVASTATIN CALCIUM 40 MG: 40 TABLET, FILM COATED ORAL at 20:06

## 2021-11-12 NOTE — NURSING NOTE
Pt is concerned about new swelling in hands and joint pain. Swelling was not present prior to Iron infusion today at 1600. Pt is anxious upon entry to room she states she is nauseous but is not SOA. NP is notified and no new orders at this time. Will continue to monitor.

## 2021-11-12 NOTE — OP NOTE
ESOPHAGOGASTRODUODENOSCOPY Procedure Report    Patient Name:  Duyen Odom  YOB: 1955    Date of Surgery:  11/12/2021     Pre-Op Diagnosis:  Anemia, unspecified type [D64.9]    Post-Op Diagnosis:  1.  Severe grade D erosive esophagitis  2.  Status post lap band       Procedure/CPT® Codes:      Procedure(s):  ESOPHAGOGASTRODUODENOSCOPY    Staff:  Surgeon(s):  Austin Wick MD      Anesthesia: Monitored Anesthesia Care    Description of Procedure:  A description of the procedure as well as risks, benefits and alternative methods were explained to the patient who voiced understanding and signed the corresponding consent form. A physical exam was performed and vital signs were monitored throughout the procedure.    After informed consent was obtained, the patient was placed in the left lateral decubitus position and sedated as above.  The Olympus video gastroscope was inserted into the oropharynx and the esophagus was intubated without difficulty.  Examination of the esophagus, stomach, pylorus, duodenal bulb, and second portion of the duodenum was performed without difficulty. The scope was then retroflexed and the fundus was visualized. The procedure was not difficult and there were no immediate complications.  There was no blood loss.    Findings:  Esophagus: Severe grade D erosive esophagitis with pooling of liquid in the distal esophagus.  The distal esophagus appeared cobblestoned because of the extensive erosions.  No obvious evidence of Greer's esophagus.  Stomach: Evidence of prior lap band  Duodenum: Normal    Impression:  1.  Severe erosive esophagitis  2.  Lap band    Recommendations:  1.  Pantoprazole 40 mg p.o. twice daily  2.  Iron supplementation  3.  Regular soft diet as tolerated  4.  Follow-up in my office in 2 to 3 months  5.  We will see as needed      Austin Wick MD     Date: 11/12/2021    Time: 13:08 EST      .

## 2021-11-12 NOTE — PROGRESS NOTES
[facility] Medicine Services Daily Progress Note    Patient Name: Duyen Odom  : 1955  MRN: [id number]  Primary Care Physician:  Ellen Red, APRN  Date of admission: 11/10/2021      Subjective      Chief Complaint: Dyspnea    2021: Patient seen and examined this morning.  Feels better compared to yesterday.  Dyspnea improving.  No dizziness or syncope.  States she has not followed up with any doctors for a while, last saw her PCP in early 's and denies any history of anemia, no history of malignancy or ever had EGD/colonoscopy.    2021: Patient seen and examined this morning.  Feeling tired but overall improving.  Does admit that she was having some difficulty swallowing and some nausea at home over the past 2 weeks.  Going for EGD today.    Denies any associated fever, chills, chest pain, abdominal pain, diarrhea, dysuria, hematuria, melena, hematochezia, or hematemesis.    Objective      Vitals:   Temp:  [97.3 °F (36.3 °C)-98.6 °F (37 °C)] 98.4 °F (36.9 °C)  Heart Rate:  [63-75] 63  Resp:  [15-16] 16  BP: (107-146)/(62-98) 110/71    Physical Exam:    General: Awake, alert, elderly female, lying in bed, NAD  Eyes: PERRL, EOMI, conjunctive are clear  Cardiovascular: Regular rate and rhythm, no murmurs  Respiratory: Clear to auscultation bilaterally, no wheezing or rales, unlabored breathing  Abdomen: Soft, nontender, positive bowel sounds, no guarding  Neurologic: A&O, CN grossly intact, moves all extremities spontaneously  Musculoskeletal: Normal range of motion, no deformities  Skin: Warm, dry, intact     Result Review    Result Review:  I have personally reviewed the results from the time of this admission to 2021 10:15 EST and agree with these findings:  [x]  Laboratory  [x]  Microbiology  [x]  Radiology  [x]  EKG/Telemetry   []  Cardiology/Vascular   []  Pathology  []  Old records  []  Other:          Assessment/Plan      Brief Patient  Summary:  Duyen Odom is a 66 y.o. female w/PMH of HLD, HTN who presents to [facility] ED due to dyspnea.  Patient states dyspnea has progressively worsened over the last 2 weeks, dyspnea is worse with exertion, no aggravating or alleviating factors noted. As dyspnea did not improve she decided to go to [facility] ED. in the ED patient noted to have hemoglobin of 6.2 with mildly elevated troponin and elevated D-dimer.  CTA chest negative for PE but does show changes of lap banding previously and possible obstruction however patient not having any GI symptoms.  General surgery consulted.  Cardiology also consulted for elevated troponin.  Patient denies any ACS symptoms.  She was transfused 1 unit of PRBCs with improvement.  No signs of any GI bleed.  Hematology consulted.  General surgery and GI consulted due to the CT findings.      atorvastatin, 40 mg, Oral, Nightly  pantoprazole, 40 mg, Intravenous, Q12H  sodium chloride, 10 mL, Intravenous, Q12H  sodium chloride, 3 mL, Intravenous, Q12H             Active Hospital Problems:  Active Hospital Problems    Diagnosis    • **Symptomatic anemia    • Dyspnea    • Elevated troponin    • Elevated d-dimer    • History of COVID-19      Plan:   Dyspnea  Symptomatic MALINDA  -Dyspnea likely caused by the anemia, hemoglobin 6.2 on admission, transfused 1 unit with improvement  -No signs of any GI bleed, FOBT ordered  -Iron panel reviewed  -Patient on room air, Covid negative  -Hematology following    Elevated D-dimer  History of gastric lap band  -CTA chest negative for PE but other findings noted  -General surgery and GI following, recommending EGD today  -Bilateral LE venous Doppler also ordered to rule out DVT    Elevated troponin  -Likely demand ischemia from anemia  -EKG reviewed, troponins elevated  -Start statin, hold anticoagulation for now due to anemia  -Echo shows preserved EF  -Stress test abnormal, discussed with cardiology, recommending cath  possibly tomorrow pending EGD results    DVT prophylaxis  -SCDs for now        CODE STATUS:    Level Of Support Discussed With: Patient  Code Status (Patient has no pulse and is not breathing): CPR (Attempt to Resuscitate)  Medical Interventions (Patient has pulse or is breathing): Full Support      Disposition: Home once improved    Electronically signed by Rashad Whitfield DO, 11/12/21, 10:15 EST.  [facility] Hospitalist Team

## 2021-11-12 NOTE — PROGRESS NOTES
Hematology/Oncology Inpatient Progress Note    PATIENT NAME: Duyen Odom  : 1955  MRN: [id number]    CHIEF COMPLAINT: Dyspnea    HISTORY OF PRESENT ILLNESS:    Duyen Odom is a 66 y.o. female who presented to [facility] on 11/10/2021 with complaints of shortness of breath that has progressively gotten worse over the last 2 weeks. Dyspnea is accompanied with fever, cough, chest tightness and BLE. Patient was called by PCP to go to ED after receiving elevated d-dimer that was drawn in the office.  Patient was seen in PCP office yesterday for cough, weakness, shortness of breath, some chest discomfort and leg swelling. Patient was started Z-jaja and prednisone with Robintussin for cough.  Patient has been afebrile for 48 hours.  Patient has history of pulmonary embolism years ago after kidney stone surgery, was started on anticoagulation temporarily afterwards.  Denies black tarry stools, hematochezia or hematemesis.       Upon arrival to the ED vitals temp 99.4, , RR 16, /74, O2 sat 100% on room air.  Labs notable for troponin 0 0.899, proBNP 1187, glucose 96, , K4.3, CO2 24, creatinine 1.22, BUN 17, GFR 44, WBC 6.4, Hgb 6.2, platelets 484, neutrophils 67.5.  PCR panel negative.  Covid test negative.  EKG shows sinus rhythm rate 79, low voltage extremity and precordial leads.  Chest x-ray shows no acute chest findings.  Patient treated in the ED with IV Benadryl, 125 mg of IV Solu-Medrol, 1000 mL NS bolus. Chart review shows patient presented to PCP yesterday with complaints of loss of taste, loss of smell with prior Covid diagnosis in 2021, cough, fever, sore throat with positive Covid contact 3 weeks prior, symptoms present for 9 days.  Patient has not been vaccinated     21  Hematology/Oncology was consulted for symptomatic anemia.  Hemoglobin 6.2, received 1 unit of PRBC's. CT PE protocol showed no acute PE.  The esophagus is markedly dilated and fluid  filled to the level of thoracic inlet.  Patient patient had a previous gastric lap band procedure.  This suggest that the gastric lap band may have too much tension obstructing the gastroesophageal junction.   consulted for evaluation and management of possible GE junction obstruction.  Denies history of malignancy or ever having EGD/colonoscopy.  Denies fever, chills, chest pain, abdominal pain, nausea, vomiting, diarrhea, dysuria, hematuria, melena, hematochezia or hematemesis.  Past history of PE several years ago.       He/She  has a past medical history of Anemia, Chest tightness, Elevated troponin, High platelet count, Hyperlipidemia, Hypertension, and Positive D-dimer.     PCP: Ellen Red APRN    History of present illness was reviewed and is unchanged from the previous visit. 11/12/21    Subjective     ROS:  Review of Systems   Review of Systems   Constitutional: Negative for activity change, appetite change, chills, diaphoresis, fatigue, fever and unexpected weight change.   HENT: Negative for congestion, dental problem, ear discharge, ear pain, facial swelling, hearing loss, mouth sores, nosebleeds, sore throat, tinnitus, trouble swallowing and voice change.    Eyes: Negative for photophobia and visual disturbance.   Respiratory: Negative for cough, choking, chest tightness, shortness of breath and wheezing.    Cardiovascular: Negative for chest pain, palpitations and leg swelling.   Gastrointestinal: Negative for abdominal distention, abdominal pain, blood in stool, constipation, diarrhea, nausea and vomiting.   Endocrine: Negative.    Genitourinary: Negative for decreased urine volume, difficulty urinating, dysuria, flank pain, frequency, hematuria and urgency.   Musculoskeletal: Negative for back pain, gait problem, joint swelling, myalgias, neck pain and neck stiffness.   Skin: Negative for color change, rash and wound.   Neurological: Negative for dizziness, tremors, syncope, speech  "difficulty, weakness, numbness and headaches.   Hematological: Negative.    Psychiatric/Behavioral: Negative.         MEDICATIONS:    Scheduled Meds:  atorvastatin, 40 mg, Oral, Nightly  pantoprazole, 40 mg, Intravenous, Q12H  sodium chloride, 10 mL, Intravenous, Q12H  sodium chloride, 3 mL, Intravenous, Q12H       Continuous Infusions:  sodium chloride, 50 mL/hr, Last Rate: 50 mL/hr (11/12/21 1243)       PRN Meds:  •  acetaminophen **OR** acetaminophen **OR** acetaminophen  •  Calcium Gluconate-NaCl **AND** calcium gluconate **AND** Calcium, Ionized  •  magnesium sulfate **OR** magnesium sulfate **OR** magnesium sulfate  •  nitroglycerin  •  ondansetron **OR** ondansetron  •  [COMPLETED] Insert peripheral IV **AND** sodium chloride  •  sodium chloride  •  sodium chloride  •  sodium chloride     ALLERGIES:    Allergies   Allergen Reactions   • Iodinated Diagnostic Agents Hives     HIVES ALL OVER CHEST DURING IVP       Objective    VITALS:   /68   Pulse 63   Temp 98.5 °F (36.9 °C) (Oral)   Resp 16   Ht 152.4 cm (60\")   Wt 61.3 kg (135 lb 2.3 oz)   SpO2 98%   BMI 26.39 kg/m²     PHYSICAL EXAM: (performed by MD)  Physical Exam    Vitals and nursing note reviewed. Exam conducted with a chaperone present.   Constitutional:       General: She is not in acute distress.     Appearance: Normal appearance. She is normal weight. She is not ill-appearing, toxic-appearing or diaphoretic.   HENT:      Head: Normocephalic and atraumatic.      Right Ear: Tympanic membrane, ear canal and external ear normal.      Left Ear: Tympanic membrane, ear canal and external ear normal.      Nose: Nose normal. No congestion or rhinorrhea.      Mouth/Throat:      Mouth: Mucous membranes are moist.      Pharynx: Oropharynx is clear.   Eyes:      General:         Right eye: No discharge.         Left eye: No discharge.      Extraocular Movements: Extraocular movements intact.      Conjunctiva/sclera: Conjunctivae normal.      Pupils: " Pupils are equal, round, and reactive to light.   Neck:      Vascular: No carotid bruit.   Cardiovascular:      Rate and Rhythm: Normal rate and regular rhythm.      Pulses: Normal pulses.      Heart sounds: Normal heart sounds. No murmur heard.  No friction rub. No gallop.    Pulmonary:      Effort: Pulmonary effort is normal. No respiratory distress.      Breath sounds: Normal breath sounds. No stridor. No wheezing, rhonchi or rales.   Chest:      Chest wall: No tenderness.   Abdominal:      General: Abdomen is flat. Bowel sounds are normal. There is no distension.      Palpations: Abdomen is soft. There is no mass.      Tenderness: There is abdominal tenderness. There is no guarding or rebound.      Hernia: No hernia is present.   Genitourinary:     Rectum: Normal.   Musculoskeletal:         General: No swelling, tenderness or signs of injury. Normal range of motion.      Cervical back: Normal range of motion and neck supple. No rigidity or tenderness.      Right lower leg: Edema present.      Left lower leg: Edema present.   Lymphadenopathy:      Cervical: No cervical adenopathy.   Skin:     General: Skin is warm and dry.      Capillary Refill: Capillary refill takes less than 2 seconds.      Coloration: Skin is not jaundiced.      Findings: No erythema or rash.   Neurological:      General: No focal deficit present.      Mental Status: She is alert and oriented to person, place, and time. Mental status is at baseline.      Cranial Nerves: No cranial nerve deficit.      Sensory: No sensory deficit.      Motor: No weakness.      Coordination: Coordination normal.      Gait: Gait normal.      Deep Tendon Reflexes: Reflexes normal.   Psychiatric:         Mood and Affect: Mood normal.         Behavior: Behavior normal.         Thought Content: Thought content normal.         Judgment: Judgment normal.      RECENT LABS:  Lab Results (last 24 hours)     Procedure Component Value Units Date/Time    Troponin [[id number]]   (Abnormal) Collected: 11/12/21 0519    Specimen: Blood Updated: 11/12/21 0716     Troponin T 0.732 ng/mL     Narrative:      Troponin T Reference Range:  <= 0.03 ng/mL-   Negative for AMI  >0.03 ng/mL-     Abnormal for myocardial necrosis.  Clinicians would have to utilize clinical acumen, EKG, Troponin and serial changes to determine if it is an Acute Myocardial Infarction or myocardial injury due to an underlying chronic condition.       Results may be falsely decreased if patient taking Biotin.      Magnesium [[id number]]  (Abnormal) Collected: 11/12/21 0519    Specimen: Blood Updated: 11/12/21 0715     Magnesium 3.1 mg/dL     Comprehensive Metabolic Panel [[id number]]  (Abnormal) Collected: 11/12/21 0519    Specimen: Blood Updated: 11/12/21 0715     Glucose 90 mg/dL      BUN 23 mg/dL      Creatinine 1.06 mg/dL      Sodium 143 mmol/L      Potassium 4.3 mmol/L      Chloride 109 mmol/L      CO2 22.0 mmol/L      Calcium 7.6 mg/dL      Total Protein 5.3 g/dL      Albumin 3.00 g/dL      ALT (SGPT) 6 U/L      AST (SGOT) 13 U/L      Alkaline Phosphatase 77 U/L      Total Bilirubin 0.3 mg/dL      eGFR Non African Amer 52 mL/min/1.73      Globulin 2.3 gm/dL      A/G Ratio 1.3 g/dL      BUN/Creatinine Ratio 21.7     Anion Gap 12.0 mmol/L     Narrative:      GFR Normal >60  Chronic Kidney Disease <60  Kidney Failure <15      CBC & Differential [[id number]]  (Abnormal) Collected: 11/12/21 0519    Specimen: Blood Updated: 11/12/21 0646    Narrative:      The following orders were created for panel order CBC & Differential.  Procedure                               Abnormality         Status                     ---------                               -----------         ------                     CBC Auto Differential[[id number]]        Abnormal            Final result               Scan Slide[[id number]]                                                                    Please view results for these tests on the individual orders.     CBC Auto Differential [[id number]]  (Abnormal) Collected: 11/12/21 0519    Specimen: Blood Updated: 11/12/21 0646     WBC 11.50 10*3/mm3      RBC 3.83 10*6/mm3      Hemoglobin 7.6 g/dL      Hematocrit 25.0 %      MCV 65.3 fL      Comment: Result checked         MCH 20.0 pg      MCHC 30.5 g/dL      RDW 27.2 %      RDW-SD 59.1 fl      MPV 8.5 fL      Platelets 451 10*3/mm3      Neutrophil % 73.3 %      Lymphocyte % 16.4 %      Monocyte % 9.4 %      Eosinophil % 0.2 %      Basophil % 0.7 %      Neutrophils, Absolute 8.40 10*3/mm3      Lymphocytes, Absolute 1.90 10*3/mm3      Monocytes, Absolute 1.10 10*3/mm3      Eosinophils, Absolute 0.00 10*3/mm3      Basophils, Absolute 0.10 10*3/mm3      nRBC 0.1 /100 WBC     Vitamin D 25 Hydroxy [[id number]] Collected: 11/10/21 1511    Specimen: Blood Updated: 11/11/21 2036     25 Hydroxy, Vitamin D 7.2 ng/ml     Narrative:      Reference Range for Total Vitamin D 25(OH)     Deficiency <20.0 ng/mL   Insufficiency 21-29 ng/mL   Sufficiency  ng/mL  Toxicity >100 ng/ml    Results may be falsely increased if patient taking Biotin.      Haptoglobin [[id number]]  (Abnormal) Collected: 11/11/21 1103    Specimen: Blood Updated: 11/11/21 1757     Haptoglobin 205 mg/dL     Vitamin B12 [[id number]]  (Normal) Collected: 11/11/21 1103    Specimen: Blood Updated: 11/11/21 1727     Vitamin B-12 320 pg/mL     Narrative:      Results may be falsely increased if patient taking Biotin.      Folate [[id number]]  (Normal) Collected: 11/11/21 1103    Specimen: Blood Updated: 11/11/21 1727     Folate 11.80 ng/mL     Narrative:      Results may be falsely increased if patient taking Biotin.            PENDING RESULTS:     IMAGING REVIEWED:  CT Abdomen Pelvis Without Contrast    Result Date: 11/11/2021  1. Marked fluid distention of the included esophagus, status post lap band placement. This suggests an abnormally tight band resulting in obstruction. Given the degree of esophageal distention, some  degree of chronicity is likely present. 2. There is otherwise no evidence of acute disease in the abdomen or pelvis. 3. Evidence of recent contrast administration. Electronically signed by:  German Gardner M.D.  11/10/2021 10:08 PM    CT Chest Pulmonary Embolism    Result Date: 11/10/2021   1. No acute pulmonary embolic disease. 2. The esophagus is markedly dilated and fluid-filled to the level of the thoracic inlet. The patient has had a previous gastric lap band procedure. This suggests that the gastric lap band may have too much tension obstructing the gastroesophageal junction. 3. No active pulmonary disease. 4. Additional incidental findings as noted above.  Electronically Signed By-Car Jackson MD On:11/10/2021 9:16 PM This report was finalized on [id number] by  Car Jackson MD.      Assessment/Plan   ASSESSMENT:    Symptomatic anemia: Hemoglobin 6.2-admission: received 1 unit of PRBC's. Hemoglobin today is 7.6. Iron sat 3%, iron 14, transferrin 292, TIBC 435, ferritin 3.51.   Differentials include GI bleed, inflammation, infection, DIC, malabsorption, dietary deficiencies, heart, kidney disease.  Will give patient iv iron due to iron sat being 3% and will obtain anemia work up.    The esophagus is markedly dilated and fluid filled to the level of thoracic inlet.  Patient patient had a previous gastric lap band procedure, may have too much tension obstructing the gastroesophageal junction.   consulted for evaluation      Ferrlecit IV 11/11/21  EGD: severe grade D erosive esophagitis. Started on pantoprazole 40 mg BID with follow up in GI in 2-3 months  Colonoscopy will be outpatient    Dyspnea: has improved. Patient on room air.   Cardiology consult - ECHO: normal cardiac valves with mild tricuspid regurg, EF 60%     Elevated D-dimer: Past history of PE several years ago. CTA chest negative for PE, other findings noted, general surgery consulted for possible GE junction obstruction; negative doppler  for DVT    Elevated troponin: Likely demand ischemia from anemia.  Echo ordered. Cardiology pending    DVT prophylaxis: SCD for now    Hx PE: has been several years ago. Was on anticoagulants for short period, No longer on anticoagulation.  CTA negative for PE    PLAN:   1. General surgery consulted: possible GE junction obstruction.   2. Bilateral doppler to rule out DVT: negative  3. FOBT: pending  4. Continue supportive care  5. If discharged, follow up with Dr Black in 1-2 weeks    Electronically signed by Lulu Silvestre, OTILIO, 11/12/21, 3:25 PM EST.        Ms. Odom was admitted with symptoms of anemia.  She also had very significant pica.  Her laboratory exams were consistent with both severe anemia and clear iron deficiency.  She has a history of a myeloproliferative disorder that was treated 25 years before this admission and she seems to have had a very good response.  At this point there is no suggestion of myeloproliferative disorder.  She has not seen her physician in more than 10 years.  She did have hematochezia sometime ago.  It lasted briefly and she underwent a colonoscopy that apparently did not disclose the source of the bleeding.  After that she became asymptomatic.  On direct questioning she does not have many more symptoms than the above.  She has been afebrile.  She has been free of chest pains.  She eats well and has not had any fluctuations in weight.  She is free of dysphagia and denies hematemesis.  She has not been coughing more than usual and does not have hemoptysis.  She is also free of abdominal pain.  Lately she has neither had melena nor hematochezia.  No dysuria or hematuria.  No vaginal bleeding or discharge.  She has been free of peripheral edema or skin rashes.At the time of admission she had severe anemia with very severe microcytosis and moderate thrombocytosis, all of which, of course, are highly suggestive of iron deficiency.  Her ferritin was only 3.5 and she had a total  iron binding capacity in the upper part of the range of normalcy with a very low saturation.  This confirms with very little place to doubt iron deficiency.  This most likely is the result of chronic blood loss from the gastrointestinal tract.  This in spite of the fact that she has not had melena or hematochezia lately.  The rest of the work-up for anemia is completely unremarkable and there is no suggestion of hemolysis.  She is to have an upper gastrointestinal and lower gastrointestinal endoscopy tomorrow that hopefully will clarify the source of bleeding.  Given her lack of symptoms I suspect that angiodysplasia or something along those lines is going to be responsible for chronic bleeding.  I will continue to follow her.  At this point that she will be treated with iron and will receive blood transfusion.

## 2021-11-12 NOTE — ANESTHESIA POSTPROCEDURE EVALUATION
Patient: Duyen Odom    Procedure Summary     Date: 11/12/21 Room / Location: [facility] ENDOSCOPY 1 / [facility] ENDOSCOPY    Anesthesia Start: 1243 Anesthesia Stop: 1254    Procedure: ESOPHAGOGASTRODUODENOSCOPY (N/A ) Diagnosis:       Anemia, unspecified type      (Anemia, unspecified type [D64.9])    Surgeons: Austin Wick MD Provider: Jack Sylvester MD    Anesthesia Type: MAC ASA Status: 3          Anesthesia Type: MAC    Vitals  Vitals Value Taken Time   BP 94/57 11/12/21 1318   Temp     Pulse 68 11/12/21 1318   Resp 15 11/12/21 1318   SpO2 96 % 11/12/21 1318           Post Anesthesia Care and Evaluation    Patient location during evaluation: PACU  Patient participation: complete - patient participated  Level of consciousness: awake  Pain scale: See nurse's notes for pain score.  Pain management: adequate  Airway patency: patent  Anesthetic complications: No anesthetic complications  PONV Status: none  Cardiovascular status: acceptable  Respiratory status: acceptable  Hydration status: acceptable    Comments: Patient seen and examined postoperatively; vital signs stable; SpO2 greater than or equal to 90%; cardiopulmonary status stable; nausea/vomiting adequately controlled; pain adequately controlled; no apparent anesthesia complications; patient discharged from anesthesia care when discharge criteria were met

## 2021-11-12 NOTE — ANESTHESIA PREPROCEDURE EVALUATION
Anesthesia Evaluation     Patient summary reviewed and Nursing notes reviewed   no history of anesthetic complications:  NPO Solid Status: > 8 hours  NPO Liquid Status: > 8 hours           Airway   Mallampati: II  TM distance: >3 FB  Neck ROM: full  No difficulty expected  Dental      Pulmonary - normal exam   (+) shortness of breath,   Cardiovascular - normal exam    (+) hypertension, hyperlipidemia,       Neuro/Psych- negative ROS  GI/Hepatic/Renal/Endo - negative ROS     Musculoskeletal (-) negative ROS    Abdominal  - normal exam   Substance History - negative use     OB/GYN negative ob/gyn ROS         Other   blood dyscrasia anemia,                     Anesthesia Plan    ASA 3     MAC     intravenous induction     Anesthetic plan, all risks, benefits, and alternatives have been provided, discussed and informed consent has been obtained with: patient.

## 2021-11-12 NOTE — PROGRESS NOTES
Referring Provider: Rashad Whitfield DO    Reason for follow-up:  Chest pain  Shortness of breath  Positive stress Cardiolite test     Patient Care Team:  Ellen Red APRN as PCP - General (Nurse Practitioner)    Subjective .      ROS    Since I have last seen, the patient has been without any chest discomfort ,shortness of breath, palpitations, dizziness or syncope.  Denies having any headache ,abdominal pain ,nausea, vomiting , diarrhea constipation, loss of weight or loss of appetite.  Denies having any excessive bruising ,hematuria or blood in the stool.    Review of all systems negative except as indicated    History  Past Medical History:   Diagnosis Date   • Anemia    • Chest tightness    • Elevated troponin    • High platelet count    • Hyperlipidemia    • Hypertension    • Positive D-dimer        Past Surgical History:   Procedure Laterality Date   • ABDOMINAL SURGERY      lap band   •  SECTION     • HYSTERECTOMY     • LAPAROSCOPIC GASTRIC BANDING         Family History   Problem Relation Age of Onset   • Mental illness Mother    • Hypertension Mother    • Diabetes Father    • Heart disease Father        Social History     Tobacco Use   • Smoking status: Never Smoker   • Smokeless tobacco: Never Used   Vaping Use   • Vaping Use: Never used   Substance Use Topics   • Alcohol use: Yes     Comment: occ.   • Drug use: Never        Medications Prior to Admission   Medication Sig Dispense Refill Last Dose   • azithromycin (Zithromax Z-Aneesh) 250 MG tablet Take 2 tablets by mouth on day 1, then 1 tablet daily on days 2-5 6 tablet 0 11/10/2021 at Unknown time   • benzonatate (Tessalon Perles) 100 MG capsule Take 1 capsule by mouth 3 (Three) Times a Day As Needed for Cough. 30 capsule 1 11/10/2021 at Unknown time   • predniSONE (DELTASONE) 10 MG (48) dose pack Take as directed on pack 1 each 0 11/10/2021 at Unknown time       Allergies  Iodinated diagnostic agents    Scheduled Meds:pantoprazole, 40 mg,  "Intravenous, Q12H  sodium chloride, 10 mL, Intravenous, Q12H  sodium chloride, 3 mL, Intravenous, Q12H      Continuous Infusions:   PRN Meds:.•  acetaminophen **OR** acetaminophen **OR** acetaminophen  •  Calcium Gluconate-NaCl **AND** calcium gluconate **AND** Calcium, Ionized  •  magnesium sulfate **OR** magnesium sulfate **OR** magnesium sulfate  •  nitroglycerin  •  ondansetron **OR** ondansetron  •  [COMPLETED] Insert peripheral IV **AND** sodium chloride  •  sodium chloride  •  sodium chloride    Objective     VITAL SIGNS  Vitals:    11/11/21 1600 11/11/21 1945 11/12/21 0011 11/12/21 0434   BP: 146/62 107/98 126/73 109/64   BP Location:  Right arm Right arm Right arm   Patient Position:  Lying Lying Lying   Pulse: 75 74 63 70   Resp: 16 15 16 16   Temp: 97.3 °F (36.3 °C) 98.6 °F (37 °C) 98.2 °F (36.8 °C) 98.4 °F (36.9 °C)   TempSrc: Oral Oral Oral Oral   SpO2: 100% 98% 99% 98%   Weight:    61.3 kg (135 lb 2.3 oz)   Height:           Flowsheet Rows      First Filed Value   Admission Height 152.4 cm (60\") Documented at 11/10/2021 1436   Admission Weight 58.1 kg (128 lb 1.4 oz) Documented at 11/10/2021 1436            Intake/Output Summary (Last 24 hours) at 11/12/2021 0658  Last data filed at 11/11/2021 1945  Gross per 24 hour   Intake 240 ml   Output --   Net 240 ml        TELEMETRY: Sinus rhythm    Physical Exam:  The patient is alert, oriented and in no distress.  Vital signs as noted above.  Head and neck revealed no carotid bruits or jugular venous distention.  No thyromegaly or lymphadenopathy is present  Lungs clear.  No wheezing.  Breath sounds are normal bilaterally.  Heart normal first and second heart sounds.  No murmur. No precordial rub is present.  No gallop is present.  Abdomen soft and nontender.  No organomegaly is present.  Extremities with good peripheral pulses without any pedal edema.  Skin warm and dry.  Musculoskeletal system is grossly normal  CNS grossly normal      Results Review:   I " reviewed the patient's new clinical results.  Lab Results (last 24 hours)     Procedure Component Value Units Date/Time    CBC & Differential [[id number]]  (Abnormal) Collected: 11/12/21 0519    Specimen: Blood Updated: 11/12/21 0646    Narrative:      The following orders were created for panel order CBC & Differential.  Procedure                               Abnormality         Status                     ---------                               -----------         ------                     CBC Auto Differential[[id number]]        Abnormal            Final result               Scan Slide[[id number]]                                                                    Please view results for these tests on the individual orders.    CBC Auto Differential [[id number]]  (Abnormal) Collected: 11/12/21 0519    Specimen: Blood Updated: 11/12/21 0646     WBC 11.50 10*3/mm3      RBC 3.83 10*6/mm3      Hemoglobin 7.6 g/dL      Hematocrit 25.0 %      MCV 65.3 fL      Comment: Result checked         MCH 20.0 pg      MCHC 30.5 g/dL      RDW 27.2 %      RDW-SD 59.1 fl      MPV 8.5 fL      Platelets 451 10*3/mm3      Neutrophil % 73.3 %      Lymphocyte % 16.4 %      Monocyte % 9.4 %      Eosinophil % 0.2 %      Basophil % 0.7 %      Neutrophils, Absolute 8.40 10*3/mm3      Lymphocytes, Absolute 1.90 10*3/mm3      Monocytes, Absolute 1.10 10*3/mm3      Eosinophils, Absolute 0.00 10*3/mm3      Basophils, Absolute 0.10 10*3/mm3      nRBC 0.1 /100 WBC     Magnesium [[id number]] Collected: 11/12/21 0519    Specimen: Blood Updated: 11/12/21 0624    Troponin [[id number]] Collected: 11/12/21 0519    Specimen: Blood Updated: 11/12/21 0624    Comprehensive Metabolic Panel [[id number]] Collected: 11/12/21 0519    Specimen: Blood Updated: 11/12/21 0624    Vitamin D 25 Hydroxy [[id number]] Collected: 11/10/21 1511    Specimen: Blood Updated: 11/11/21 2036     25 Hydroxy, Vitamin D 7.2 ng/ml     Narrative:      Reference Range for Total Vitamin D  25(OH)     Deficiency <20.0 ng/mL   Insufficiency 21-29 ng/mL   Sufficiency  ng/mL  Toxicity >100 ng/ml    Results may be falsely increased if patient taking Biotin.      Haptoglobin [[id number]]  (Abnormal) Collected: 11/11/21 1103    Specimen: Blood Updated: 11/11/21 1757     Haptoglobin 205 mg/dL     Vitamin B12 [[id number]]  (Normal) Collected: 11/11/21 1103    Specimen: Blood Updated: 11/11/21 1727     Vitamin B-12 320 pg/mL     Narrative:      Results may be falsely increased if patient taking Biotin.      Folate [[id number]]  (Normal) Collected: 11/11/21 1103    Specimen: Blood Updated: 11/11/21 1727     Folate 11.80 ng/mL     Narrative:      Results may be falsely increased if patient taking Biotin.      Niacin (Vitamin B3) [[id number]] Collected: 11/11/21 1216    Specimen: Blood Updated: 11/11/21 1239    Methylmalonic Acid, Serum [[id number]] Collected: 11/11/21 1216    Specimen: Blood Updated: 11/11/21 1239    Vitamin B1, Whole Blood [[id number]] Collected: 11/11/21 1216    Specimen: Blood Updated: 11/11/21 1239    Vitamin A & E [[id number]] Collected: 11/11/21 1216    Specimen: Blood Updated: 11/11/21 1239    Vitamin B2 [[id number]] Collected: 11/11/21 1215    Specimen: Blood Updated: 11/11/21 1239    Vitamin K1 [[id number]] Collected: 11/11/21 1215    Specimen: Blood Updated: 11/11/21 1239    Bilirubin, Total & Direct [[id number]] Collected: 11/11/21 1103    Specimen: Blood Updated: 11/11/21 1140     Total Bilirubin 0.5 mg/dL      Bilirubin, Direct 0.2 mg/dL      Bilirubin, Indirect 0.3 mg/dL     Lactate Dehydrogenase [[id number]]  (Abnormal) Collected: 11/11/21 1103    Specimen: Blood Updated: 11/11/21 1140      U/L     Urine Drug Screen - Urine, Clean Catch [[id number]]  (Normal) Collected: 11/11/21 1006    Specimen: Urine, Clean Catch Updated: 11/11/21 1132     Amphet/Methamphet, Screen Negative     Barbiturates Screen, Urine Negative     Benzodiazepine Screen, Urine Negative     Cocaine Screen,  Urine Negative     Opiate Screen Negative     THC, Screen, Urine Negative     Methadone Screen, Urine Negative     Oxycodone Screen, Urine Negative    Narrative:      Negative Thresholds Per Drugs Screened:    Amphetamines                 500 ng/ml  Barbiturates                 200 ng/ml  Benzodiazepines              100 ng/ml  Cocaine                      300 ng/ml  Methadone                    300 ng/ml  Opiates                      300 ng/ml  Oxycodone                    100 ng/ml  THC                           50 ng/ml    The Normal Value for all drugs tested is negative. This report includes final unconfirmed screening results to be used for medical treatment purposes only. Unconfirmed results must not be used for non-medical purposes such as employment or legal testing. Clinical consideration should be applied to any drug of abuse test, particularly when unconfirmed results are used.          All urine drugs of abuse requests without chain of custody are for medical screening purposes only.  False positives are possible.      Hemoglobin & Hematocrit, Blood [[id number]]  (Abnormal) Collected: 11/11/21 1103    Specimen: Blood Updated: 11/11/21 1118     Hemoglobin 7.6 g/dL      Hematocrit 24.3 %     Reticulocytes [[id number]]  (Normal) Collected: 11/11/21 0921    Specimen: Blood Updated: 11/11/21 1116     Reticulocyte % 1.38 %      Reticulocyte Absolute 0.0525 10*6/mm3     Folate RBC [[id number]] Collected: 11/11/21 1103    Specimen: Blood Updated: 11/11/21 1112    Methylmalonic Acid, Serum [[id number]] Collected: 11/11/21 1103    Specimen: Blood Updated: 11/11/21 1112    THAIS + PE [[id number]] Collected: 11/11/21 1103    Specimen: Blood Updated: 11/11/21 1111    Urinalysis With Culture If Indicated - Urine, Clean Catch [[id number]]  (Abnormal) Collected: 11/11/21 1006    Specimen: Urine, Clean Catch Updated: 11/11/21 1051     Color, UA Yellow     Appearance, UA Clear     pH, UA 6.0     Specific Gravity, UA 1.052      Glucose, UA Negative     Ketones, UA Negative     Bilirubin, UA Negative     Blood, UA Negative     Protein, UA Negative     Leuk Esterase, UA Negative     Nitrite, UA Negative     Urobilinogen, UA 1.0 E.U./dL    Narrative:      Urine microscopic not indicated.    CBC & Differential [[id number]]  (Abnormal) Collected: 11/11/21 0921    Specimen: Blood Updated: 11/11/21 1027    Narrative:      The following orders were created for panel order CBC & Differential.  Procedure                               Abnormality         Status                     ---------                               -----------         ------                     CBC Auto Differential[[id number]]        Abnormal            Final result               Scan Slide[[id number]]                                                                    Please view results for these tests on the individual orders.    CBC Auto Differential [[id number]]  (Abnormal) Collected: 11/11/21 0921    Specimen: Blood Updated: 11/11/21 1027     WBC 3.90 10*3/mm3      RBC 3.80 10*6/mm3      Hemoglobin 7.6 g/dL      Hematocrit 25.1 %      MCV 66.1 fL      MCH 19.9 pg      MCHC 30.1 g/dL      RDW 27.1 %      RDW-SD 60.4 fl      MPV 7.5 fL      Platelets 443 10*3/mm3      Neutrophil % 80.8 %      Lymphocyte % 14.9 %      Monocyte % 4.0 %      Eosinophil % 0.0 %      Basophil % 0.3 %      Neutrophils, Absolute 3.20 10*3/mm3      Lymphocytes, Absolute 0.60 10*3/mm3      Monocytes, Absolute 0.20 10*3/mm3      Eosinophils, Absolute 0.00 10*3/mm3      Basophils, Absolute 0.00 10*3/mm3      nRBC 0.1 /100 WBC     Ferritin [[id number]]  (Abnormal) Collected: 11/10/21 1952    Specimen: Blood Updated: 11/11/21 0812     Ferritin 3.51 ng/mL     Narrative:      Results may be falsely decreased if patient taking Biotin.      Iron Profile [[id number]]  (Abnormal) Collected: 11/10/21 1952    Specimen: Blood Updated: 11/11/21 0807     Iron 14 mcg/dL      Iron Saturation 3 %      Transferrin 292 mg/dL       TIBC 435 mcg/dL           Imaging Results (Last 24 Hours)     ** No results found for the last 24 hours. **      LAB RESULTS (LAST 7 DAYS)    CBC  Results from last 7 days   Lab Units 11/12/21  0519 11/11/21  1103 11/11/21  0921 11/11/21  0116 11/10/21  1511 11/09/21  1534   WBC 10*3/mm3 11.50*  --  3.90  --  6.40 7.0   RBC 10*6/mm3 3.83  --  3.80  --  3.46* 3.82   HEMOGLOBIN g/dL 7.6* 7.6* 7.6* 7.8* 6.2* 6.3*   HEMATOCRIT % 25.0* 24.3* 25.1* 25.7* 20.7* 24.4*   MCV fL 65.3*  --  66.1*  --  59.9* 64*   PLATELETS 10*3/mm3 451*  --  443  --  484* 527*       BMP  Results from last 7 days   Lab Units 11/11/21  0116 11/10/21  1952 11/10/21  1511 11/09/21  1534   SODIUM mmol/L 139  --  138 140   POTASSIUM mmol/L 4.3  --  4.3 CANCELED   CHLORIDE mmol/L 107  --  104 103   TOTAL CO2 mmol/L  --   --   --  20   CO2 mmol/L 21.0*  --  24.0  --    BUN mg/dL 15  --  17 17   CREATININE mg/dL 0.93  --  1.22* 1.34*   GLUCOSE mg/dL 156*  --  96 CANCELED   MAGNESIUM mg/dL 1.9 1.9  --   --    PHOSPHORUS mg/dL  --  3.3  --   --        CMP   Results from last 7 days   Lab Units 11/11/21  1103 11/11/21  0116 11/10/21  1511 11/09/21  1534   SODIUM mmol/L  --  139 138 140   POTASSIUM mmol/L  --  4.3 4.3 CANCELED   CHLORIDE mmol/L  --  107 104 103   TOTAL CO2 mmol/L  --   --   --  20   CO2 mmol/L  --  21.0* 24.0  --    BUN mg/dL  --  15 17 17   CREATININE mg/dL  --  0.93 1.22* 1.34*   GLUCOSE mg/dL  --  156* 96 CANCELED   ALBUMIN g/dL  --   --   --  3.8   BILIRUBIN mg/dL 0.5  --   --  0.2   ALK PHOS IU/L  --   --   --  82   AST (SGOT) IU/L  --   --   --  20   ALT (SGPT) IU/L  --   --   --  9         BNP  Results from last 7 days   Lab Units 11/09/21  1534   BNP pg/mL 96.3       TROPONIN  Results from last 7 days   Lab Units 11/11/21  0116   TROPONIN T ng/mL 0.499*       CoAg  Results from last 7 days   Lab Units 11/10/21  2123 11/10/21  1511   INR   --  0.94   APTT seconds 24.1  --        Creatinine Clearance  Estimated Creatinine  Clearance: 49.3 mL/min (by C-G formula based on SCr of 0.93 mg/dL).    ABG        Radiology  CT Abdomen Pelvis Without Contrast    Result Date: 11/11/2021  1. Marked fluid distention of the included esophagus, status post lap band placement. This suggests an abnormally tight band resulting in obstruction. Given the degree of esophageal distention, some degree of chronicity is likely present. 2. There is otherwise no evidence of acute disease in the abdomen or pelvis. 3. Evidence of recent contrast administration. Electronically signed by:  German Gardner M.D.  11/10/2021 10:08 PM    CT Chest Pulmonary Embolism    Result Date: 11/10/2021   1. No acute pulmonary embolic disease. 2. The esophagus is markedly dilated and fluid-filled to the level of the thoracic inlet. The patient has had a previous gastric lap band procedure. This suggests that the gastric lap band may have too much tension obstructing the gastroesophageal junction. 3. No active pulmonary disease. 4. Additional incidental findings as noted above.  Electronically Signed By-Car Jackson MD On:11/10/2021 9:16 PM This report was finalized on [id number] by  Car Jackson MD.              EKG                      I personally viewed and interpreted the patient's EKG/Telemetry data: Normal sinus rhythm normal ECG    ECHOCARDIOGRAM:    Results for orders placed during the hospital encounter of 11/10/21    Adult Transthoracic Echo Complete W/ Cont if Necessary Per Protocol    Interpretation Summary  · Estimated left ventricular EF = 60% Left ventricular systolic function is normal.    Patient  Shortness of breath    Technically satisfactory study.  Mitral valve is structurally normal.  Tricuspid valve is structurally normal.  Mild tricuspid regurgitation is present.  Aortic valve is structurally normal.  Pulmonic valve could not be well visualized.  No evidence for mitral or aortic regurgitation is seen by Doppler study.  Left atrium is normal in size.  Right  atrium is normal in size.  Left ventricle is normal in size and contractility with ejection fraction of 60%.  Right ventricle is normal in size.  Atrial septum is intact.  Aorta is normal.  No pericardial effusion or intracardiac thrombus is seen.    Impression  Structurally and functionally normal cardiac valves except for mild tricuspid regurgitation.  Left ventricular size and contractility is normal with ejection fraction of 60%.          STRESS MYOVIEW:    Cardiolite (Tc-99m Sestamibi) stress test    CARDIAC CATHETERIZATION:            OTHER:         Assessment/Plan     Principal Problem:    Symptomatic anemia  Active Problems:    Dyspnea    Elevated troponin    Elevated d-dimer    History of COVID-19        ]]]]]]]]]]]]]]]]]]]]  Impression  ===========  -Progressively worsening shortness of breath.  Exertional chest discomfort suggestive of angina pectoris.     -Elevated troponin level-non-STEMI   0.899  0.799 0.499     -Hypertension dyslipidemia     -Significant anemia with hemoglobin of 6.2  No obvious GI symptoms.     -Status post lap band surgery.  CT scan of the chest revealed markedly dilated esophagus with fluid-filled to the thoracic inlet.  Status post  section hysterectomy     -Elevated D-dimer  CT with PE protocol is negative for pulmonary embolus.     -Family history is positive for coronary artery disease.     -Non-smoker     -Allergic to iodine.  ==============  Plan  =========  Patient is having exertional chest discomfort.  Patient has progressively worsening shortness of breath.  Echocardiogram 11/10/2021 revealed normal left ventricle function mild tricuspid regurgitation.  Stress Cardiolite test is abnormal-2021 with inferior and apical ischemic infarction changes.    Significant anemia.  Admission hemoglobin 6.2  Hemoglobin 7.6-2021 after PRBC transfusion.  Not sure the exact etiology of anemia.    Status post lap band surgery.  CT scan of the chest revealed markedly  dilated esophagus with fluid-filled to the thoracic inlet.  Appreciated Dr. Hidalgo's note.  Lap band was adjusted.    Patient to have upper endoscopy today.    Patient would benefit from cardiac catheterization and coronary arteriography for assessment of coronary artery status.  Risks and benefits pros and cons of the procedure were discussed with patient including infection bleeding blood clot heart attack stroke allergic reaction to the dye renal dysfunction etc.    I had a lengthy discussion with her regarding concerns with placement of a stent in the presence of anemia and possible source of bleeding problems with LAP-BAND etc.    We will wait for upper endoscopy results prior to making a definite time for cardiac catheterization.  1 other option would be to perform cardiac catheterization and not intervene until the GI situation is resolved.    Have discussed with attending physician Dr. Whitfield for coordination of care.  Further plan will depend on patient's progress.  ]]]]]]]]]]]]]]]]]]]        Vanita Chris MD  11/12/21  06:58 EST

## 2021-11-12 NOTE — PLAN OF CARE
Goal Outcome Evaluation:  Plan of Care Reviewed With: patient        Progress: no change  Outcome Summary: Patient without complaints of pain thus far on shift. Pt had EGD- severe erosive esophagitis. Pt to have soft diet per GI. Pt to recieve 1 unit PRBCs and to have a heart cath tomorrow with MD Chris. Will continue to monitor.

## 2021-11-12 NOTE — PLAN OF CARE
Goal Outcome Evaluation:              Outcome Summary: Pt resting well. Plans for an EGD this AM. Will continue to monitor.

## 2021-11-12 NOTE — CASE MANAGEMENT/SOCIAL WORK
Continued Stay Note   [facility]     Patient Name: Duyen Odom  MRN: [id number]  Today's Date: 11/12/2021    Admit Date: 11/10/2021     Discharge Plan     Row Name 11/12/21 1554       Plan    Plan DC plan: anticipate return home with spouse.    Plan Comments DC barriers: myoview pending, EGD              Phone communication or documentation only - no physical contact with patient or family.      Megan Naegele, RN      Office Phone: [phone]  Office Cell: [phone]

## 2021-11-13 LAB
ANION GAP SERPL CALCULATED.3IONS-SCNC: 10 MMOL/L (ref 5–15)
BASOPHILS # BLD AUTO: 0 10*3/MM3 (ref 0–0.2)
BASOPHILS NFR BLD AUTO: 0.2 % (ref 0–1.5)
BUN SERPL-MCNC: 22 MG/DL (ref 8–23)
BUN/CREAT SERPL: 15.7 (ref 7–25)
CALCIUM SPEC-SCNC: 7.6 MG/DL (ref 8.6–10.5)
CHLORIDE SERPL-SCNC: 110 MMOL/L (ref 98–107)
CHOLEST SERPL-MCNC: 146 MG/DL (ref 0–200)
CO2 SERPL-SCNC: 23 MMOL/L (ref 22–29)
CREAT SERPL-MCNC: 1.4 MG/DL (ref 0.57–1)
DEPRECATED RDW RBC AUTO: 65.2 FL (ref 37–54)
EOSINOPHIL # BLD AUTO: 0.1 10*3/MM3 (ref 0–0.4)
EOSINOPHIL NFR BLD AUTO: 1.4 % (ref 0.3–6.2)
ERYTHROCYTE [DISTWIDTH] IN BLOOD BY AUTOMATED COUNT: 28.6 % (ref 12.3–15.4)
GFR SERPL CREATININE-BSD FRML MDRD: 38 ML/MIN/1.73
GLUCOSE SERPL-MCNC: 94 MG/DL (ref 65–99)
HCT VFR BLD AUTO: 26.2 % (ref 34–46.6)
HDLC SERPL-MCNC: 42 MG/DL (ref 40–60)
HGB BLD-MCNC: 8.3 G/DL (ref 12–15.9)
INR PPP: 0.96 (ref 0.93–1.1)
LDLC SERPL CALC-MCNC: 90 MG/DL (ref 0–100)
LDLC/HDLC SERPL: 2.14 {RATIO}
LYMPHOCYTES # BLD AUTO: 2 10*3/MM3 (ref 0.7–3.1)
LYMPHOCYTES NFR BLD AUTO: 31.7 % (ref 19.6–45.3)
MAGNESIUM SERPL-MCNC: 2.4 MG/DL (ref 1.6–2.4)
MCH RBC QN AUTO: 21.8 PG (ref 26.6–33)
MCHC RBC AUTO-ENTMCNC: 31.6 G/DL (ref 31.5–35.7)
MCV RBC AUTO: 68.8 FL (ref 79–97)
MONOCYTES # BLD AUTO: 0.9 10*3/MM3 (ref 0.1–0.9)
MONOCYTES NFR BLD AUTO: 14.5 % (ref 5–12)
NEUTROPHILS NFR BLD AUTO: 3.3 10*3/MM3 (ref 1.7–7)
NEUTROPHILS NFR BLD AUTO: 52.2 % (ref 42.7–76)
NRBC BLD AUTO-RTO: 0.6 /100 WBC (ref 0–0.2)
PLATELET # BLD AUTO: 344 10*3/MM3 (ref 140–450)
PMV BLD AUTO: 7.6 FL (ref 6–12)
POTASSIUM SERPL-SCNC: 4.8 MMOL/L (ref 3.5–5.2)
PROTHROMBIN TIME: 10.7 SECONDS (ref 9.6–11.7)
QT INTERVAL: 362 MS
QT INTERVAL: 373 MS
QT INTERVAL: 406 MS
RBC # BLD AUTO: 3.8 10*6/MM3 (ref 3.77–5.28)
SODIUM SERPL-SCNC: 143 MMOL/L (ref 136–145)
TRIGL SERPL-MCNC: 71 MG/DL (ref 0–150)
VLDLC SERPL-MCNC: 14 MG/DL (ref 5–40)
WBC # BLD AUTO: 6.4 10*3/MM3 (ref 3.4–10.8)

## 2021-11-13 PROCEDURE — 80048 BASIC METABOLIC PNL TOTAL CA: CPT | Performed by: INTERNAL MEDICINE

## 2021-11-13 PROCEDURE — C1894 INTRO/SHEATH, NON-LASER: HCPCS | Performed by: INTERNAL MEDICINE

## 2021-11-13 PROCEDURE — B2111ZZ FLUOROSCOPY OF MULTIPLE CORONARY ARTERIES USING LOW OSMOLAR CONTRAST: ICD-10-PCS | Performed by: INTERNAL MEDICINE

## 2021-11-13 PROCEDURE — 93456 R HRT CORONARY ARTERY ANGIO: CPT | Performed by: INTERNAL MEDICINE

## 2021-11-13 PROCEDURE — 99152 MOD SED SAME PHYS/QHP 5/>YRS: CPT | Performed by: INTERNAL MEDICINE

## 2021-11-13 PROCEDURE — 83735 ASSAY OF MAGNESIUM: CPT | Performed by: INTERNAL MEDICINE

## 2021-11-13 PROCEDURE — 4A023N8 MEASUREMENT OF CARDIAC SAMPLING AND PRESSURE, BILATERAL, PERCUTANEOUS APPROACH: ICD-10-PCS | Performed by: INTERNAL MEDICINE

## 2021-11-13 PROCEDURE — 36415 COLL VENOUS BLD VENIPUNCTURE: CPT | Performed by: INTERNAL MEDICINE

## 2021-11-13 PROCEDURE — 99232 SBSQ HOSP IP/OBS MODERATE 35: CPT | Performed by: INTERNAL MEDICINE

## 2021-11-13 PROCEDURE — 0 IOPAMIDOL PER 1 ML: Performed by: INTERNAL MEDICINE

## 2021-11-13 PROCEDURE — 80061 LIPID PANEL: CPT | Performed by: INTERNAL MEDICINE

## 2021-11-13 PROCEDURE — C1769 GUIDE WIRE: HCPCS | Performed by: INTERNAL MEDICINE

## 2021-11-13 PROCEDURE — 25010000002 METHYLPREDNISOLONE PER 125 MG: Performed by: INTERNAL MEDICINE

## 2021-11-13 PROCEDURE — 25010000002 DIPHENHYDRAMINE PER 50 MG: Performed by: INTERNAL MEDICINE

## 2021-11-13 PROCEDURE — 25010000002 MIDAZOLAM PER 1 MG: Performed by: INTERNAL MEDICINE

## 2021-11-13 PROCEDURE — 25010000002 FENTANYL CITRATE (PF) 100 MCG/2ML SOLUTION: Performed by: INTERNAL MEDICINE

## 2021-11-13 PROCEDURE — 63710000001 PREDNISONE PER 5 MG: Performed by: INTERNAL MEDICINE

## 2021-11-13 PROCEDURE — 85610 PROTHROMBIN TIME: CPT | Performed by: INTERNAL MEDICINE

## 2021-11-13 PROCEDURE — 85025 COMPLETE CBC W/AUTO DIFF WBC: CPT | Performed by: INTERNAL MEDICINE

## 2021-11-13 PROCEDURE — 99233 SBSQ HOSP IP/OBS HIGH 50: CPT | Performed by: INTERNAL MEDICINE

## 2021-11-13 RX ORDER — NITROGLYCERIN 20 MG/100ML
10-50 INJECTION INTRAVENOUS
Status: DISCONTINUED | OUTPATIENT
Start: 2021-11-13 | End: 2021-11-14 | Stop reason: HOSPADM

## 2021-11-13 RX ORDER — SODIUM CHLORIDE 9 MG/ML
250 INJECTION, SOLUTION INTRAVENOUS ONCE AS NEEDED
Status: DISCONTINUED | OUTPATIENT
Start: 2021-11-13 | End: 2021-11-14 | Stop reason: HOSPADM

## 2021-11-13 RX ORDER — ACETAMINOPHEN 325 MG/1
650 TABLET ORAL EVERY 4 HOURS PRN
Status: DISCONTINUED | OUTPATIENT
Start: 2021-11-13 | End: 2021-11-13 | Stop reason: SDUPTHER

## 2021-11-13 RX ORDER — FENTANYL CITRATE 50 UG/ML
INJECTION, SOLUTION INTRAMUSCULAR; INTRAVENOUS AS NEEDED
Status: DISCONTINUED | OUTPATIENT
Start: 2021-11-13 | End: 2021-11-13 | Stop reason: HOSPADM

## 2021-11-13 RX ORDER — ACETYLCYSTEINE 200 MG/ML
600 SOLUTION ORAL; RESPIRATORY (INHALATION) EVERY 12 HOURS SCHEDULED
Status: DISCONTINUED | OUTPATIENT
Start: 2021-11-13 | End: 2021-11-13

## 2021-11-13 RX ORDER — DIPHENHYDRAMINE HCL 25 MG
25 CAPSULE ORAL EVERY 6 HOURS PRN
Status: DISCONTINUED | OUTPATIENT
Start: 2021-11-13 | End: 2021-11-14 | Stop reason: HOSPADM

## 2021-11-13 RX ORDER — DIPHENHYDRAMINE HYDROCHLORIDE 50 MG/ML
INJECTION INTRAMUSCULAR; INTRAVENOUS AS NEEDED
Status: DISCONTINUED | OUTPATIENT
Start: 2021-11-13 | End: 2021-11-13 | Stop reason: HOSPADM

## 2021-11-13 RX ORDER — MIDAZOLAM HYDROCHLORIDE 1 MG/ML
INJECTION INTRAMUSCULAR; INTRAVENOUS AS NEEDED
Status: DISCONTINUED | OUTPATIENT
Start: 2021-11-13 | End: 2021-11-13 | Stop reason: HOSPADM

## 2021-11-13 RX ORDER — ONDANSETRON 4 MG/1
4 TABLET, FILM COATED ORAL EVERY 6 HOURS PRN
Status: DISCONTINUED | OUTPATIENT
Start: 2021-11-13 | End: 2021-11-14 | Stop reason: HOSPADM

## 2021-11-13 RX ORDER — PREDNISONE 1 MG/1
TABLET ORAL AS NEEDED
Status: DISCONTINUED | OUTPATIENT
Start: 2021-11-13 | End: 2021-11-13 | Stop reason: HOSPADM

## 2021-11-13 RX ORDER — NITROGLYCERIN 20 MG/100ML
INJECTION INTRAVENOUS
Status: COMPLETED
Start: 2021-11-13 | End: 2021-11-13

## 2021-11-13 RX ORDER — SODIUM CHLORIDE 9 MG/ML
100 INJECTION, SOLUTION INTRAVENOUS CONTINUOUS
Status: DISCONTINUED | OUTPATIENT
Start: 2021-11-13 | End: 2021-11-14 | Stop reason: HOSPADM

## 2021-11-13 RX ORDER — METHYLPREDNISOLONE SODIUM SUCCINATE 125 MG/2ML
INJECTION, POWDER, LYOPHILIZED, FOR SOLUTION INTRAMUSCULAR; INTRAVENOUS AS NEEDED
Status: DISCONTINUED | OUTPATIENT
Start: 2021-11-13 | End: 2021-11-13 | Stop reason: HOSPADM

## 2021-11-13 RX ORDER — LIDOCAINE HYDROCHLORIDE 20 MG/ML
INJECTION, SOLUTION INFILTRATION; PERINEURAL AS NEEDED
Status: DISCONTINUED | OUTPATIENT
Start: 2021-11-13 | End: 2021-11-13 | Stop reason: HOSPADM

## 2021-11-13 RX ORDER — SODIUM CHLORIDE 9 MG/ML
INJECTION, SOLUTION INTRAVENOUS CONTINUOUS PRN
Status: COMPLETED | OUTPATIENT
Start: 2021-11-13 | End: 2021-11-13

## 2021-11-13 RX ORDER — ASPIRIN 81 MG/1
81 TABLET ORAL DAILY
Status: DISCONTINUED | OUTPATIENT
Start: 2021-11-14 | End: 2021-11-14 | Stop reason: HOSPADM

## 2021-11-13 RX ORDER — ONDANSETRON 2 MG/ML
4 INJECTION INTRAMUSCULAR; INTRAVENOUS EVERY 6 HOURS PRN
Status: DISCONTINUED | OUTPATIENT
Start: 2021-11-13 | End: 2021-11-14 | Stop reason: HOSPADM

## 2021-11-13 RX ORDER — SODIUM CHLORIDE 9 MG/ML
100 INJECTION, SOLUTION INTRAVENOUS
Status: COMPLETED | OUTPATIENT
Start: 2021-11-13 | End: 2021-11-13

## 2021-11-13 RX ADMIN — SODIUM CHLORIDE, PRESERVATIVE FREE 10 ML: 5 INJECTION INTRAVENOUS at 09:00

## 2021-11-13 RX ADMIN — Medication 600 MG: at 13:20

## 2021-11-13 RX ADMIN — SODIUM CHLORIDE 100 ML/HR: 0.9 INJECTION, SOLUTION INTRAVENOUS at 10:40

## 2021-11-13 RX ADMIN — Medication 600 MG: at 21:39

## 2021-11-13 RX ADMIN — NITROGLYCERIN 15 MCG/MIN: 20 INJECTION INTRAVENOUS at 13:22

## 2021-11-13 RX ADMIN — PANTOPRAZOLE SODIUM 40 MG: 40 INJECTION, POWDER, FOR SOLUTION INTRAVENOUS at 22:07

## 2021-11-13 RX ADMIN — ATORVASTATIN CALCIUM 40 MG: 40 TABLET, FILM COATED ORAL at 20:47

## 2021-11-13 RX ADMIN — ACETAMINOPHEN 650 MG: 325 TABLET, FILM COATED ORAL at 13:20

## 2021-11-13 RX ADMIN — SODIUM CHLORIDE 100 ML/HR: 9 INJECTION, SOLUTION INTRAVENOUS at 21:39

## 2021-11-13 RX ADMIN — NICARDIPINE HYDROCHLORIDE 5 MG/HR: 25 INJECTION, SOLUTION INTRAVENOUS at 14:00

## 2021-11-13 RX ADMIN — Medication 3 ML: at 12:32

## 2021-11-13 NOTE — CONSULTS
"Nutrition Services    Patient Name: Duyen Odom  YOB: 1955  MRN: [id number]  Admission date: 11/10/2021    Comment:  Patient refused any supplements.    PPE Documentation        PPE Worn By Provider mask and eye protection   PPE Worn By Patient  None     CLINICAL NUTRITION ASSESSMENT      Reason for Assessment Consult for Nursing Admission Screen, MST of 2     H&P  66 y.o. female w/PMH of HLD, HTN who presents to [facility] ED due to dyspnea.  Patient states dyspnea has progressively worsened over the last 2 weeks, dyspnea is worse with exertion, no aggravating or alleviating factors noted.  Patient admits to cough, fever, chills, fatigue, BLE edema.  Patient denies tobacco use, illegal drug use, alcohol use, nausea, vomiting, melena.  As dyspnea did not improve she decided to go to [facility] ED.    Past Medical History:   Diagnosis Date   • Anemia    • Chest tightness    • Elevated troponin    • High platelet count    • Hyperlipidemia    • Hypertension    • Positive D-dimer        Past Surgical History:   Procedure Laterality Date   • ABDOMINAL SURGERY      lap band   •  SECTION     • HYSTERECTOMY     • LAPAROSCOPIC GASTRIC BANDING          Current Problems   Anemia  Dyspnea  Elevated troponin  Elevated D-dimer     Encounter Information        Trending Narrative     : Patient just moved to [facility] for monitoring today.  RD visited patient at bedside.  Patient refusing any supplements at this time despite reporting a recent 10# weight loss.  Patient states no N/V.     Anthropometrics        Current Height, Weight Height: 152.4 cm (60\")  Weight: 62.8 kg (138 lb 7.2 oz) (21)       Ideal Body Weight (IBW) 100#   Usual Body Weight (UBW) 140# per patient       Trending Weight Hx     This admission: 130-138# range             PTA: No weight history per chart review    Wt Readings from Last 30 Encounters:   21 62.8 kg (138 lb 7.2 oz)   21 0434 " 61.3 kg (135 lb 2.3 oz)   11/11/21 1416 59.9 kg (132 lb)   11/11/21 0408 60.1 kg (132 lb 7.9 oz)   11/10/21 1932 60.1 kg (132 lb 7.9 oz)   11/10/21 1436 58.1 kg (128 lb 1.4 oz)   11/09/21 1316 59 kg (130 lb)      BMI kg/m2 Body mass index is 27.04 kg/m².       Labs        Pertinent Labs    Results from last 7 days   Lab Units 11/13/21  0537 11/12/21  0519 11/11/21  1103 11/11/21  0116 11/10/21  1511 11/09/21  1534   SODIUM mmol/L 143 143  --  139   < > 140   POTASSIUM mmol/L 4.8 4.3  --  4.3   < > CANCELED   CHLORIDE mmol/L 110* 109*  --  107   < > 103   TOTAL CO2 mmol/L  --   --   --   --   --  20   CO2 mmol/L 23.0 22.0  --  21.0*   < >  --    BUN mg/dL 22 23  --  15   < > 17   CREATININE mg/dL 1.40* 1.06*  --  0.93   < > 1.34*   CALCIUM mg/dL 7.6* 7.6*  --  7.8*   < > 8.5*   BILIRUBIN mg/dL  --  0.3 0.5  --   --  0.2   ALK PHOS U/L  --  77  --   --   --  82   ALT (SGPT) U/L  --  6  --   --   --  9   AST (SGOT) U/L  --  13  --   --   --  20   GLUCOSE mg/dL 94 90  --  156*   < > CANCELED    < > = values in this interval not displayed.     Results from last 7 days   Lab Units 11/13/21  0537 11/12/21  2147 11/12/21  1836 11/12/21  0519 11/11/21  0116 11/10/21  1952   MAGNESIUM mg/dL 2.4  --  2.5* 3.1*   < > 1.9   PHOSPHORUS mg/dL  --   --   --   --   --  3.3   HEMOGLOBIN g/dL 8.3*   < >  --  7.6*   < >  --    HEMATOCRIT % 26.2*   < >  --  25.0*   < >  --    TRIGLYCERIDES mg/dL 71  --   --   --   --   --     < > = values in this interval not displayed.     COVID19   Date Value Ref Range Status   11/10/2021 Not Detected Not Detected - Ref. Range Final     No results found for: HGBA1C     Medications    Scheduled Medications acetylcysteine, 600 mg, Oral, Q12H  [START ON 11/14/2021] aspirin, 81 mg, Oral, Daily  atorvastatin, 40 mg, Oral, Nightly  pantoprazole, 40 mg, Intravenous, Q12H  sodium chloride, 10 mL, Intravenous, Q12H        Infusions sodium chloride, 100 mL/hr        PRN Medications •  acetaminophen **OR**  acetaminophen **OR** acetaminophen  •  atropine  •  Calcium Gluconate-NaCl **AND** calcium gluconate **AND** Calcium, Ionized  •  diphenhydrAMINE  •  magnesium sulfate **OR** magnesium sulfate **OR** magnesium sulfate  •  nitroglycerin  •  ondansetron **OR** ondansetron  •  ondansetron **OR** ondansetron  •  [COMPLETED] Insert peripheral IV **AND** sodium chloride  •  sodium chloride  •  sodium chloride     Physical Findings        Trending Physical   Appearance, NFPE 11/13: NFPE performed and not consistent with malnutrition at this time.   --  Edema  1-2+ edema to various areas     Bowel Function Last BM 11/9 (x 4 days ago)     Tubes No feeding tube     Chewing/Swallowing No issues per patient      Skin Intact     --  Current Nutrition Orders & Evaluation of Intake       Oral Nutrition     Food Allergies NKFA   Current PO Diet Diet Cardiac; Healthy Heart   Supplement None ordered   PO Evaluation     Trending % PO Intake 11/13: 56% of average po intakes since admission   --  Nutritional Risk Screening        NRS-2002 Score          Nutrition Diagnosis         Nutrition Dx Problem 1 No nutritional diagnosis noted at this time, RD will continue to monitor for any nutritional diagnosis that may arise.    Nutrition Dx Problem 2        Intervention Goal         Intervention Goal(s) PO intakes greater than 75%     Nutrition Intervention        RD Action Continue current diet and encourage good po intakes.     Nutrition Prescription          Diet Prescription Heart Healthy   Supplement Prescription None (patient refused)   --  Monitor/Evaluation        Monitor I&O, PO intake, Supplement intake, Weight, Skin status, Symptoms, POC/GOC     Electronically signed by:  Geneva Hollingsworth RD  11/13/21 12:22 EST

## 2021-11-13 NOTE — PLAN OF CARE
Goal Outcome Evaluation:              Outcome Summary: Pt resting well with no complaints of pain. NPO for heart cath this AM. Will continue to monitor.

## 2021-11-13 NOTE — H&P (VIEW-ONLY)
Referring Provider: Rashad Whitfield DO    Reason for follow-up:  Chest pain  Shortness of breath  Positive stress Cardiolite test     Patient Care Team:  Ellen Red APRN as PCP - General (Nurse Practitioner)    Subjective .      ROS    Since I have last seen, the patient has been without any chest discomfort ,shortness of breath, palpitations, dizziness or syncope.  Denies having any headache ,abdominal pain ,nausea, vomiting , diarrhea constipation, loss of weight or loss of appetite.  Denies having any excessive bruising ,hematuria or blood in the stool.    Review of all systems negative except as indicated    History  Past Medical History:   Diagnosis Date   • Anemia    • Chest tightness    • Elevated troponin    • High platelet count    • Hyperlipidemia    • Hypertension    • Positive D-dimer        Past Surgical History:   Procedure Laterality Date   • ABDOMINAL SURGERY      lap band   •  SECTION     • HYSTERECTOMY     • LAPAROSCOPIC GASTRIC BANDING         Family History   Problem Relation Age of Onset   • Mental illness Mother    • Hypertension Mother    • Diabetes Father    • Heart disease Father        Social History     Tobacco Use   • Smoking status: Never Smoker   • Smokeless tobacco: Never Used   Vaping Use   • Vaping Use: Never used   Substance Use Topics   • Alcohol use: Yes     Comment: occ.   • Drug use: Never        Medications Prior to Admission   Medication Sig Dispense Refill Last Dose   • azithromycin (Zithromax Z-Aneesh) 250 MG tablet Take 2 tablets by mouth on day 1, then 1 tablet daily on days 2-5 6 tablet 0 11/10/2021 at Unknown time   • benzonatate (Tessalon Perles) 100 MG capsule Take 1 capsule by mouth 3 (Three) Times a Day As Needed for Cough. 30 capsule 1 11/10/2021 at Unknown time   • predniSONE (DELTASONE) 10 MG (48) dose pack Take as directed on pack 1 each 0 11/10/2021 at Unknown time       Allergies  Iodinated diagnostic agents    Scheduled Meds:atorvastatin, 40 mg,  "Oral, Nightly  pantoprazole, 40 mg, Intravenous, Q12H  sodium chloride, 10 mL, Intravenous, Q12H  sodium chloride, 3 mL, Intravenous, Q12H      Continuous Infusions:   PRN Meds:.•  acetaminophen **OR** acetaminophen **OR** acetaminophen  •  Calcium Gluconate-NaCl **AND** calcium gluconate **AND** Calcium, Ionized  •  magnesium sulfate **OR** magnesium sulfate **OR** magnesium sulfate  •  nitroglycerin  •  ondansetron **OR** ondansetron  •  [COMPLETED] Insert peripheral IV **AND** sodium chloride  •  sodium chloride  •  sodium chloride    Objective     VITAL SIGNS  Vitals:    11/12/21 1924 11/12/21 2345 11/13/21 0345 11/13/21 0753   BP: 110/64 117/74 99/61 122/73   BP Location: Right arm Right arm Right arm    Patient Position: Lying Lying Lying Sitting   Pulse: 76 73 64 58   Resp: 16 16 16 16   Temp: 98.7 °F (37.1 °C) 98.7 °F (37.1 °C) 98.7 °F (37.1 °C) 98.1 °F (36.7 °C)   TempSrc: Oral Oral Oral Oral   SpO2: 95% 99% 99% 98%   Weight:   62.8 kg (138 lb 7.2 oz)    Height:           Flowsheet Rows      First Filed Value   Admission Height 152.4 cm (60\") Documented at 11/10/2021 1436   Admission Weight 58.1 kg (128 lb 1.4 oz) Documented at 11/10/2021 1436            Intake/Output Summary (Last 24 hours) at 11/13/2021 1102  Last data filed at 11/12/2021 1924  Gross per 24 hour   Intake 350 ml   Output --   Net 350 ml        TELEMETRY: Sinus rhythm    Physical Exam:  The patient is alert, oriented and in no distress.  Vital signs as noted above.  Head and neck revealed no carotid bruits or jugular venous distention.  No thyromegaly or lymphadenopathy is present  Lungs clear.  No wheezing.  Breath sounds are normal bilaterally.  Heart normal first and second heart sounds.  No murmur. No precordial rub is present.  No gallop is present.  Abdomen soft and nontender.  No organomegaly is present.  Extremities with good peripheral pulses without any pedal edema.  Skin warm and dry.  Musculoskeletal system is grossly " normal  CNS grossly normal      Results Review:   I reviewed the patient's new clinical results.  Lab Results (last 24 hours)     Procedure Component Value Units Date/Time    CBC & Differential [[id number]]  (Abnormal) Collected: 11/13/21 0537    Specimen: Blood Updated: 11/13/21 0813    Narrative:      The following orders were created for panel order CBC & Differential.  Procedure                               Abnormality         Status                     ---------                               -----------         ------                     CBC Auto Differential[[id number]]        Abnormal            Final result               Scan Slide[[id number]]                                                                    Please view results for these tests on the individual orders.    CBC Auto Differential [[id number]]  (Abnormal) Collected: 11/13/21 0537    Specimen: Blood Updated: 11/13/21 0813     WBC 6.40 10*3/mm3      RBC 3.80 10*6/mm3      Hemoglobin 8.3 g/dL      Hematocrit 26.2 %      MCV 68.8 fL      MCH 21.8 pg      MCHC 31.6 g/dL      RDW 28.6 %      RDW-SD 65.2 fl      MPV 7.6 fL      Platelets 344 10*3/mm3      Neutrophil % 52.2 %      Lymphocyte % 31.7 %      Monocyte % 14.5 %      Eosinophil % 1.4 %      Basophil % 0.2 %      Neutrophils, Absolute 3.30 10*3/mm3      Lymphocytes, Absolute 2.00 10*3/mm3      Monocytes, Absolute 0.90 10*3/mm3      Eosinophils, Absolute 0.10 10*3/mm3      Basophils, Absolute 0.00 10*3/mm3      nRBC 0.6 /100 WBC     Basic Metabolic Panel [[id number]]  (Abnormal) Collected: 11/13/21 0537    Specimen: Blood Updated: 11/13/21 0707     Glucose 94 mg/dL      BUN 22 mg/dL      Creatinine 1.40 mg/dL      Sodium 143 mmol/L      Potassium 4.8 mmol/L      Chloride 110 mmol/L      CO2 23.0 mmol/L      Calcium 7.6 mg/dL      eGFR Non African Amer 38 mL/min/1.73      BUN/Creatinine Ratio 15.7     Anion Gap 10.0 mmol/L     Narrative:      GFR Normal >60  Chronic Kidney Disease <60  Kidney  Failure <15      Lipid Panel [[id number]] Collected: 11/13/21 0537    Specimen: Blood Updated: 11/13/21 0707     Total Cholesterol 146 mg/dL      Triglycerides 71 mg/dL      HDL Cholesterol 42 mg/dL      LDL Cholesterol  90 mg/dL      VLDL Cholesterol 14 mg/dL      LDL/HDL Ratio 2.14    Narrative:      Cholesterol Reference Ranges  (U.S. Department of Health and Human Services ATP III Classifications)    Desirable          <200 mg/dL  Borderline High    200-239 mg/dL  High Risk          >240 mg/dL      Triglyceride Reference Ranges  (U.S. Department of Health and Human Services ATP III Classifications)    Normal           <150 mg/dL  Borderline High  150-199 mg/dL  High             200-499 mg/dL  Very High        >500 mg/dL    HDL Reference Ranges  (U.S. Department of Health and Human Services ATP III Classifcations)    Low     <40 mg/dl (major risk factor for CHD)  High    >60 mg/dl ('negative' risk factor for CHD)        LDL Reference Ranges  (U.S. Department of Health and Human Services ATP III Classifcations)    Optimal          <100 mg/dL  Near Optimal     100-129 mg/dL  Borderline High  130-159 mg/dL  High             160-189 mg/dL  Very High        >189 mg/dL    Magnesium [[id number]]  (Normal) Collected: 11/13/21 0537    Specimen: Blood Updated: 11/13/21 0707     Magnesium 2.4 mg/dL     Protime-INR [[id number]]  (Normal) Collected: 11/13/21 0537    Specimen: Blood Updated: 11/13/21 0650     Protime 10.7 Seconds      INR 0.96    Hemoglobin & Hematocrit, Blood [[id number]]  (Abnormal) Collected: 11/12/21 2147    Specimen: Blood Updated: 11/12/21 2202     Hemoglobin 8.7 g/dL      Hematocrit 29.1 %     Magnesium [[id number]]  (Abnormal) Collected: 11/12/21 1836    Specimen: Blood Updated: 11/12/21 1946     Magnesium 2.5 mg/dL     THAIS + PE [[id number]]  (Abnormal) Collected: 11/11/21 1103    Specimen: Blood Updated: 11/12/21 1611     IgG 869 mg/dL      IgA 346 mg/dL      IgM 90 mg/dL      Total Protein 5.7 g/dL       Albumin 2.8 g/dL      Alpha-1-Globulin 0.3 g/dL      Alpha-2-Globulin 0.7 g/dL      Beta Globulin 1.0 g/dL      Gamma Globulin 0.9 g/dL      M-Tom Not Observed g/dL      Globulin 2.9 g/dL      A/G Ratio 1.0     Immunofixation Reflex, Serum Comment     Comment: No monoclonality detected.        Please note Comment     Comment: Protein electrophoresis scan will follow via computer, mail, or   delivery.       Narrative:      Performed at:  [address] [facility]  [id number]  : Asaf Velazco PhD, Phone:  [phone]    Folate RBC [[id number]]  (Abnormal) Collected: 11/11/21 1103    Specimen: Blood Updated: 11/12/21 1611     Folate, Hemolysate 325.0 ng/mL      Hematocrit 27.5 %      RBC Folate 1182 ng/mL     Narrative:      Performed at:  [address] [facility]  [id number]  : Asaf Velazco PhD, Phone:  [phone]          Imaging Results (Last 24 Hours)     ** No results found for the last 24 hours. **      LAB RESULTS (LAST 7 DAYS)    CBC  Results from last 7 days   Lab Units 11/13/21  0537 11/12/21  2147 11/12/21  0519 11/11/21  1103 11/11/21  0921 11/11/21  0116 11/10/21  1511 11/10/21  1511 11/09/21  1534   WBC 10*3/mm3 6.40  --  11.50*  --  3.90  --   --  6.40 7.0   RBC 10*6/mm3 3.80  --  3.83  --  3.80  --   --  3.46* 3.82   HEMOGLOBIN g/dL 8.3* 8.7* 7.6* 7.6* 7.6* 7.8*  --  6.2* 6.3*   HEMATOCRIT % 26.2* 29.1* 25.0* 24.3*  27.5* 25.1* 25.7*   < > 20.7* 24.4*   MCV fL 68.8*  --  65.3*  --  66.1*  --   --  59.9* 64*   PLATELETS 10*3/mm3 344  --  451*  --  443  --   --  484* 527*    < > = values in this interval not displayed.       BMP  Results from last 7 days   Lab Units 11/13/21  0537 11/12/21  1836 11/12/21  0519 11/11/21  0116 11/10/21  1952 11/10/21  1511 11/09/21  1534   SODIUM mmol/L 143  --  143 139  --  138 140   POTASSIUM mmol/L 4.8  --  4.3 4.3  --  4.3 CANCELED   CHLORIDE mmol/L 110*  --  109* 107  --  104 103    TOTAL CO2 mmol/L  --   --   --   --   --   --  20   CO2 mmol/L 23.0  --  22.0 21.0*  --  24.0  --    BUN mg/dL 22  --  23 15  --  17 17   CREATININE mg/dL 1.40*  --  1.06* 0.93  --  1.22* 1.34*   GLUCOSE mg/dL 94  --  90 156*  --  96 CANCELED   MAGNESIUM mg/dL 2.4 2.5* 3.1* 1.9 1.9  --   --    PHOSPHORUS mg/dL  --   --   --   --  3.3  --   --        CMP   Results from last 7 days   Lab Units 11/13/21  0537 11/12/21  0519 11/11/21  1103 11/11/21  0116 11/10/21  1511 11/09/21  1534   SODIUM mmol/L 143 143  --  139 138 140   POTASSIUM mmol/L 4.8 4.3  --  4.3 4.3 CANCELED   CHLORIDE mmol/L 110* 109*  --  107 104 103   TOTAL CO2 mmol/L  --   --   --   --   --  20   CO2 mmol/L 23.0 22.0  --  21.0* 24.0  --    BUN mg/dL 22 23  --  15 17 17   CREATININE mg/dL 1.40* 1.06*  --  0.93 1.22* 1.34*   GLUCOSE mg/dL 94 90  --  156* 96 CANCELED   ALBUMIN g/dL  --  3.00* 2.8*  --   --  3.8   BILIRUBIN mg/dL  --  0.3 0.5  --   --  0.2   ALK PHOS U/L  --  77  --   --   --  82   AST (SGOT) U/L  --  13  --   --   --  20   ALT (SGPT) U/L  --  6  --   --   --  9         BNP  Results from last 7 days   Lab Units 11/09/21  1534   BNP pg/mL 96.3       TROPONIN  Results from last 7 days   Lab Units 11/12/21  0519   TROPONIN T ng/mL 0.732*       CoAg  Results from last 7 days   Lab Units 11/13/21  0537 11/10/21  2123 11/10/21  1511   INR  0.96  --  0.94   APTT seconds  --  24.1  --        Creatinine Clearance  Estimated Creatinine Clearance: 33.1 mL/min (A) (by C-G formula based on SCr of 1.4 mg/dL (H)).    ABG        Radiology  No radiology results for the last day            EKG                      I personally viewed and interpreted the patient's EKG/Telemetry data: Normal sinus rhythm normal ECG    ECHOCARDIOGRAM:    Results for orders placed during the hospital encounter of 11/10/21    Adult Transthoracic Echo Complete W/ Cont if Necessary Per Protocol    Interpretation Summary  · Estimated left ventricular EF = 60% Left ventricular  systolic function is normal.    Patient  Shortness of breath    Technically satisfactory study.  Mitral valve is structurally normal.  Tricuspid valve is structurally normal.  Mild tricuspid regurgitation is present.  Aortic valve is structurally normal.  Pulmonic valve could not be well visualized.  No evidence for mitral or aortic regurgitation is seen by Doppler study.  Left atrium is normal in size.  Right atrium is normal in size.  Left ventricle is normal in size and contractility with ejection fraction of 60%.  Right ventricle is normal in size.  Atrial septum is intact.  Aorta is normal.  No pericardial effusion or intracardiac thrombus is seen.    Impression  Structurally and functionally normal cardiac valves except for mild tricuspid regurgitation.  Left ventricular size and contractility is normal with ejection fraction of 60%.          STRESS MYOVIEW:    Cardiolite (Tc-99m Sestamibi) stress test    CARDIAC CATHETERIZATION:            OTHER:         Assessment/Plan     Principal Problem:    Symptomatic anemia  Active Problems:    Chest tightness    Non-STEMI (non-ST elevated myocardial infarction) (HCC)    Dyspnea    Elevated troponin    Elevated d-dimer    History of COVID-19        ]]]]]]]]]]]]]]]]]]]]  Impression  ===========  -Progressively worsening shortness of breath.  Exertional chest discomfort suggestive of angina pectoris.     -Elevated troponin level-non-STEMI   0.899  0.799 0.499     -Hypertension dyslipidemia     -Significant anemia with hemoglobin of 6.2  No obvious GI symptoms.     -Status post lap band surgery.  CT scan of the chest revealed markedly dilated esophagus with fluid-filled to the thoracic inlet.  Status post  section hysterectomy     -Elevated D-dimer  CT with PE protocol is negative for pulmonary embolus.     -Family history is positive for coronary artery disease.     -Non-smoker     -Allergic to iodine.  ==============  Plan  =========  Patient is having  exertional chest discomfort.  Patient has progressively worsening shortness of breath.  Echocardiogram 11/10/2021 revealed normal left ventricle function mild tricuspid regurgitation.  Stress Cardiolite test is abnormal-11/11/2021 with inferior and apical ischemic infarction changes.    Significant anemia.  Admission hemoglobin 6.2  Hemoglobin 7.6-11/12/2021 after PRBC transfusion.  Not sure the exact etiology of anemia.    Status post lap band surgery.  CT scan of the chest revealed markedly dilated esophagus with fluid-filled to the thoracic inlet.  Appreciated Dr. Hidalgo's note.  Lap band was adjusted.    Patient had upper endoscopy 11/12/2021   esophagus:     Severe grade D erosive esophagitis with pooling of liquid in the distal esophagus.  The distal esophagus appeared cobblestoned because of the extensive erosions.  No obvious evidence of Greer's esophagus.  Stomach:         Evidence of prior lap band  Duodenum:     Normal    Patient would benefit from cardiac catheterization and coronary arteriography for assessment of coronary artery status.  Risks and benefits pros and cons of the procedure were discussed with patient including infection bleeding blood clot heart attack stroke allergic reaction to the dye renal dysfunction etc.    I had a lengthy discussion with her regarding concerns with placement of a stent in the presence of anemia and possible source of bleeding problems with LAP-BAND etc.    We will wait for upper endoscopy results prior to making a definite time for cardiac catheterization.  1 other option would be to perform cardiac catheterization and not intervene until the GI situation is resolved.    Have discussed with Dr. Wick gastroenterologist impression is today and he did not think there should be any significant concern for usage of Plavix or any other anticoagulants if that is needed.    Patient is allergic to contrast.  Patient will be given IV Solu-Medrol and IV Benadryl.    Renal  dysfunction  1.34 11/9/2021  1.4 11/13/2021.  IV hydration.  Mucomyst.    Have discussed with attending physician Dr. Whitfield for coordination of care.  Further plan will depend on patient's progress.  ]]]]]]]]]]]]]]]]]]]        Vanita Chris MD  11/13/21  11:02 EST

## 2021-11-13 NOTE — PROGRESS NOTES
Referring Provider: Rashad Whitfield DO    Reason for follow-up:  Chest pain  Shortness of breath  Positive stress Cardiolite test     Patient Care Team:  Ellen Red APRN as PCP - General (Nurse Practitioner)    Subjective .      ROS    Since I have last seen, the patient has been without any chest discomfort ,shortness of breath, palpitations, dizziness or syncope.  Denies having any headache ,abdominal pain ,nausea, vomiting , diarrhea constipation, loss of weight or loss of appetite.  Denies having any excessive bruising ,hematuria or blood in the stool.    Review of all systems negative except as indicated    History  Past Medical History:   Diagnosis Date   • Anemia    • Chest tightness    • Elevated troponin    • High platelet count    • Hyperlipidemia    • Hypertension    • Positive D-dimer        Past Surgical History:   Procedure Laterality Date   • ABDOMINAL SURGERY      lap band   •  SECTION     • HYSTERECTOMY     • LAPAROSCOPIC GASTRIC BANDING         Family History   Problem Relation Age of Onset   • Mental illness Mother    • Hypertension Mother    • Diabetes Father    • Heart disease Father        Social History     Tobacco Use   • Smoking status: Never Smoker   • Smokeless tobacco: Never Used   Vaping Use   • Vaping Use: Never used   Substance Use Topics   • Alcohol use: Yes     Comment: occ.   • Drug use: Never        Medications Prior to Admission   Medication Sig Dispense Refill Last Dose   • azithromycin (Zithromax Z-Aneesh) 250 MG tablet Take 2 tablets by mouth on day 1, then 1 tablet daily on days 2-5 6 tablet 0 11/10/2021 at Unknown time   • benzonatate (Tessalon Perles) 100 MG capsule Take 1 capsule by mouth 3 (Three) Times a Day As Needed for Cough. 30 capsule 1 11/10/2021 at Unknown time   • predniSONE (DELTASONE) 10 MG (48) dose pack Take as directed on pack 1 each 0 11/10/2021 at Unknown time       Allergies  Iodinated diagnostic agents    Scheduled Meds:atorvastatin, 40 mg,  "Oral, Nightly  pantoprazole, 40 mg, Intravenous, Q12H  sodium chloride, 10 mL, Intravenous, Q12H  sodium chloride, 3 mL, Intravenous, Q12H      Continuous Infusions:   PRN Meds:.•  acetaminophen **OR** acetaminophen **OR** acetaminophen  •  Calcium Gluconate-NaCl **AND** calcium gluconate **AND** Calcium, Ionized  •  magnesium sulfate **OR** magnesium sulfate **OR** magnesium sulfate  •  nitroglycerin  •  ondansetron **OR** ondansetron  •  [COMPLETED] Insert peripheral IV **AND** sodium chloride  •  sodium chloride  •  sodium chloride    Objective     VITAL SIGNS  Vitals:    11/12/21 1924 11/12/21 2345 11/13/21 0345 11/13/21 0753   BP: 110/64 117/74 99/61 122/73   BP Location: Right arm Right arm Right arm    Patient Position: Lying Lying Lying Sitting   Pulse: 76 73 64 58   Resp: 16 16 16 16   Temp: 98.7 °F (37.1 °C) 98.7 °F (37.1 °C) 98.7 °F (37.1 °C) 98.1 °F (36.7 °C)   TempSrc: Oral Oral Oral Oral   SpO2: 95% 99% 99% 98%   Weight:   62.8 kg (138 lb 7.2 oz)    Height:           Flowsheet Rows      First Filed Value   Admission Height 152.4 cm (60\") Documented at 11/10/2021 1436   Admission Weight 58.1 kg (128 lb 1.4 oz) Documented at 11/10/2021 1436            Intake/Output Summary (Last 24 hours) at 11/13/2021 1102  Last data filed at 11/12/2021 1924  Gross per 24 hour   Intake 350 ml   Output --   Net 350 ml        TELEMETRY: Sinus rhythm    Physical Exam:  The patient is alert, oriented and in no distress.  Vital signs as noted above.  Head and neck revealed no carotid bruits or jugular venous distention.  No thyromegaly or lymphadenopathy is present  Lungs clear.  No wheezing.  Breath sounds are normal bilaterally.  Heart normal first and second heart sounds.  No murmur. No precordial rub is present.  No gallop is present.  Abdomen soft and nontender.  No organomegaly is present.  Extremities with good peripheral pulses without any pedal edema.  Skin warm and dry.  Musculoskeletal system is grossly " normal  CNS grossly normal      Results Review:   I reviewed the patient's new clinical results.  Lab Results (last 24 hours)     Procedure Component Value Units Date/Time    CBC & Differential [[id number]]  (Abnormal) Collected: 11/13/21 0537    Specimen: Blood Updated: 11/13/21 0813    Narrative:      The following orders were created for panel order CBC & Differential.  Procedure                               Abnormality         Status                     ---------                               -----------         ------                     CBC Auto Differential[[id number]]        Abnormal            Final result               Scan Slide[[id number]]                                                                    Please view results for these tests on the individual orders.    CBC Auto Differential [[id number]]  (Abnormal) Collected: 11/13/21 0537    Specimen: Blood Updated: 11/13/21 0813     WBC 6.40 10*3/mm3      RBC 3.80 10*6/mm3      Hemoglobin 8.3 g/dL      Hematocrit 26.2 %      MCV 68.8 fL      MCH 21.8 pg      MCHC 31.6 g/dL      RDW 28.6 %      RDW-SD 65.2 fl      MPV 7.6 fL      Platelets 344 10*3/mm3      Neutrophil % 52.2 %      Lymphocyte % 31.7 %      Monocyte % 14.5 %      Eosinophil % 1.4 %      Basophil % 0.2 %      Neutrophils, Absolute 3.30 10*3/mm3      Lymphocytes, Absolute 2.00 10*3/mm3      Monocytes, Absolute 0.90 10*3/mm3      Eosinophils, Absolute 0.10 10*3/mm3      Basophils, Absolute 0.00 10*3/mm3      nRBC 0.6 /100 WBC     Basic Metabolic Panel [[id number]]  (Abnormal) Collected: 11/13/21 0537    Specimen: Blood Updated: 11/13/21 0707     Glucose 94 mg/dL      BUN 22 mg/dL      Creatinine 1.40 mg/dL      Sodium 143 mmol/L      Potassium 4.8 mmol/L      Chloride 110 mmol/L      CO2 23.0 mmol/L      Calcium 7.6 mg/dL      eGFR Non African Amer 38 mL/min/1.73      BUN/Creatinine Ratio 15.7     Anion Gap 10.0 mmol/L     Narrative:      GFR Normal >60  Chronic Kidney Disease <60  Kidney  Failure <15      Lipid Panel [[id number]] Collected: 11/13/21 0537    Specimen: Blood Updated: 11/13/21 0707     Total Cholesterol 146 mg/dL      Triglycerides 71 mg/dL      HDL Cholesterol 42 mg/dL      LDL Cholesterol  90 mg/dL      VLDL Cholesterol 14 mg/dL      LDL/HDL Ratio 2.14    Narrative:      Cholesterol Reference Ranges  (U.S. Department of Health and Human Services ATP III Classifications)    Desirable          <200 mg/dL  Borderline High    200-239 mg/dL  High Risk          >240 mg/dL      Triglyceride Reference Ranges  (U.S. Department of Health and Human Services ATP III Classifications)    Normal           <150 mg/dL  Borderline High  150-199 mg/dL  High             200-499 mg/dL  Very High        >500 mg/dL    HDL Reference Ranges  (U.S. Department of Health and Human Services ATP III Classifcations)    Low     <40 mg/dl (major risk factor for CHD)  High    >60 mg/dl ('negative' risk factor for CHD)        LDL Reference Ranges  (U.S. Department of Health and Human Services ATP III Classifcations)    Optimal          <100 mg/dL  Near Optimal     100-129 mg/dL  Borderline High  130-159 mg/dL  High             160-189 mg/dL  Very High        >189 mg/dL    Magnesium [[id number]]  (Normal) Collected: 11/13/21 0537    Specimen: Blood Updated: 11/13/21 0707     Magnesium 2.4 mg/dL     Protime-INR [[id number]]  (Normal) Collected: 11/13/21 0537    Specimen: Blood Updated: 11/13/21 0650     Protime 10.7 Seconds      INR 0.96    Hemoglobin & Hematocrit, Blood [[id number]]  (Abnormal) Collected: 11/12/21 2147    Specimen: Blood Updated: 11/12/21 2202     Hemoglobin 8.7 g/dL      Hematocrit 29.1 %     Magnesium [[id number]]  (Abnormal) Collected: 11/12/21 1836    Specimen: Blood Updated: 11/12/21 1946     Magnesium 2.5 mg/dL     THAIS + PE [[id number]]  (Abnormal) Collected: 11/11/21 1103    Specimen: Blood Updated: 11/12/21 1611     IgG 869 mg/dL      IgA 346 mg/dL      IgM 90 mg/dL      Total Protein 5.7 g/dL       Albumin 2.8 g/dL      Alpha-1-Globulin 0.3 g/dL      Alpha-2-Globulin 0.7 g/dL      Beta Globulin 1.0 g/dL      Gamma Globulin 0.9 g/dL      M-Tom Not Observed g/dL      Globulin 2.9 g/dL      A/G Ratio 1.0     Immunofixation Reflex, Serum Comment     Comment: No monoclonality detected.        Please note Comment     Comment: Protein electrophoresis scan will follow via computer, mail, or   delivery.       Narrative:      Performed at:  [address]  [id number]  : Asaf Velazco PhD, Phone:  [phone]    Folate RBC [[id number]]  (Abnormal) Collected: 11/11/21 1103    Specimen: Blood Updated: 11/12/21 1611     Folate, Hemolysate 325.0 ng/mL      Hematocrit 27.5 %      RBC Folate 1182 ng/mL     Narrative:      Performed at:  [address]  [id number]  : Asaf Velazco PhD, Phone:  [phone]          Imaging Results (Last 24 Hours)     ** No results found for the last 24 hours. **      LAB RESULTS (LAST 7 DAYS)    CBC  Results from last 7 days   Lab Units 11/13/21  0537 11/12/21  2147 11/12/21  0519 11/11/21  1103 11/11/21  0921 11/11/21  0116 11/10/21  1511 11/10/21  1511 11/09/21  1534   WBC 10*3/mm3 6.40  --  11.50*  --  3.90  --   --  6.40 7.0   RBC 10*6/mm3 3.80  --  3.83  --  3.80  --   --  3.46* 3.82   HEMOGLOBIN g/dL 8.3* 8.7* 7.6* 7.6* 7.6* 7.8*  --  6.2* 6.3*   HEMATOCRIT % 26.2* 29.1* 25.0* 24.3*  27.5* 25.1* 25.7*   < > 20.7* 24.4*   MCV fL 68.8*  --  65.3*  --  66.1*  --   --  59.9* 64*   PLATELETS 10*3/mm3 344  --  451*  --  443  --   --  484* 527*    < > = values in this interval not displayed.       BMP  Results from last 7 days   Lab Units 11/13/21  0537 11/12/21  1836 11/12/21  0519 11/11/21  0116 11/10/21  1952 11/10/21  1511 11/09/21  1534   SODIUM mmol/L 143  --  143 139  --  138 140   POTASSIUM mmol/L 4.8  --  4.3 4.3  --  4.3 CANCELED   CHLORIDE mmol/L 110*  --  109* 107  --  104 103    TOTAL CO2 mmol/L  --   --   --   --   --   --  20   CO2 mmol/L 23.0  --  22.0 21.0*  --  24.0  --    BUN mg/dL 22  --  23 15  --  17 17   CREATININE mg/dL 1.40*  --  1.06* 0.93  --  1.22* 1.34*   GLUCOSE mg/dL 94  --  90 156*  --  96 CANCELED   MAGNESIUM mg/dL 2.4 2.5* 3.1* 1.9 1.9  --   --    PHOSPHORUS mg/dL  --   --   --   --  3.3  --   --        CMP   Results from last 7 days   Lab Units 11/13/21  0537 11/12/21  0519 11/11/21  1103 11/11/21  0116 11/10/21  1511 11/09/21  1534   SODIUM mmol/L 143 143  --  139 138 140   POTASSIUM mmol/L 4.8 4.3  --  4.3 4.3 CANCELED   CHLORIDE mmol/L 110* 109*  --  107 104 103   TOTAL CO2 mmol/L  --   --   --   --   --  20   CO2 mmol/L 23.0 22.0  --  21.0* 24.0  --    BUN mg/dL 22 23  --  15 17 17   CREATININE mg/dL 1.40* 1.06*  --  0.93 1.22* 1.34*   GLUCOSE mg/dL 94 90  --  156* 96 CANCELED   ALBUMIN g/dL  --  3.00* 2.8*  --   --  3.8   BILIRUBIN mg/dL  --  0.3 0.5  --   --  0.2   ALK PHOS U/L  --  77  --   --   --  82   AST (SGOT) U/L  --  13  --   --   --  20   ALT (SGPT) U/L  --  6  --   --   --  9         BNP  Results from last 7 days   Lab Units 11/09/21  1534   BNP pg/mL 96.3       TROPONIN  Results from last 7 days   Lab Units 11/12/21  0519   TROPONIN T ng/mL 0.732*       CoAg  Results from last 7 days   Lab Units 11/13/21  0537 11/10/21  2123 11/10/21  1511   INR  0.96  --  0.94   APTT seconds  --  24.1  --        Creatinine Clearance  Estimated Creatinine Clearance: 33.1 mL/min (A) (by C-G formula based on SCr of 1.4 mg/dL (H)).    ABG        Radiology  No radiology results for the last day            EKG                      I personally viewed and interpreted the patient's EKG/Telemetry data: Normal sinus rhythm normal ECG    ECHOCARDIOGRAM:    Results for orders placed during the hospital encounter of 11/10/21    Adult Transthoracic Echo Complete W/ Cont if Necessary Per Protocol    Interpretation Summary  · Estimated left ventricular EF = 60% Left ventricular  systolic function is normal.    Patient  Shortness of breath    Technically satisfactory study.  Mitral valve is structurally normal.  Tricuspid valve is structurally normal.  Mild tricuspid regurgitation is present.  Aortic valve is structurally normal.  Pulmonic valve could not be well visualized.  No evidence for mitral or aortic regurgitation is seen by Doppler study.  Left atrium is normal in size.  Right atrium is normal in size.  Left ventricle is normal in size and contractility with ejection fraction of 60%.  Right ventricle is normal in size.  Atrial septum is intact.  Aorta is normal.  No pericardial effusion or intracardiac thrombus is seen.    Impression  Structurally and functionally normal cardiac valves except for mild tricuspid regurgitation.  Left ventricular size and contractility is normal with ejection fraction of 60%.          STRESS MYOVIEW:    Cardiolite (Tc-99m Sestamibi) stress test    CARDIAC CATHETERIZATION:            OTHER:         Assessment/Plan     Principal Problem:    Symptomatic anemia  Active Problems:    Chest tightness    Non-STEMI (non-ST elevated myocardial infarction) (HCC)    Dyspnea    Elevated troponin    Elevated d-dimer    History of COVID-19        ]]]]]]]]]]]]]]]]]]]]  Impression  ===========  -Progressively worsening shortness of breath.  Exertional chest discomfort suggestive of angina pectoris.     -Elevated troponin level-non-STEMI   0.899  0.799 0.499     -Hypertension dyslipidemia     -Significant anemia with hemoglobin of 6.2  No obvious GI symptoms.     -Status post lap band surgery.  CT scan of the chest revealed markedly dilated esophagus with fluid-filled to the thoracic inlet.  Status post  section hysterectomy     -Elevated D-dimer  CT with PE protocol is negative for pulmonary embolus.     -Family history is positive for coronary artery disease.     -Non-smoker     -Allergic to iodine.  ==============  Plan  =========  Patient is having  exertional chest discomfort.  Patient has progressively worsening shortness of breath.  Echocardiogram 11/10/2021 revealed normal left ventricle function mild tricuspid regurgitation.  Stress Cardiolite test is abnormal-11/11/2021 with inferior and apical ischemic infarction changes.    Significant anemia.  Admission hemoglobin 6.2  Hemoglobin 7.6-11/12/2021 after PRBC transfusion.  Not sure the exact etiology of anemia.    Status post lap band surgery.  CT scan of the chest revealed markedly dilated esophagus with fluid-filled to the thoracic inlet.  Appreciated Dr. Hidalgo's note.  Lap band was adjusted.    Patient had upper endoscopy 11/12/2021   esophagus:     Severe grade D erosive esophagitis with pooling of liquid in the distal esophagus.  The distal esophagus appeared cobblestoned because of the extensive erosions.  No obvious evidence of Greer's esophagus.  Stomach:         Evidence of prior lap band  Duodenum:     Normal    Patient would benefit from cardiac catheterization and coronary arteriography for assessment of coronary artery status.  Risks and benefits pros and cons of the procedure were discussed with patient including infection bleeding blood clot heart attack stroke allergic reaction to the dye renal dysfunction etc.    I had a lengthy discussion with her regarding concerns with placement of a stent in the presence of anemia and possible source of bleeding problems with LAP-BAND etc.    We will wait for upper endoscopy results prior to making a definite time for cardiac catheterization.  1 other option would be to perform cardiac catheterization and not intervene until the GI situation is resolved.    Have discussed with Dr. Wick gastroenterologist impression is today and he did not think there should be any significant concern for usage of Plavix or any other anticoagulants if that is needed.    Patient is allergic to contrast.  Patient will be given IV Solu-Medrol and IV Benadryl.    Renal  dysfunction  1.34 11/9/2021  1.4 11/13/2021.  IV hydration.  Mucomyst.    Have discussed with attending physician Dr. Whitfield for coordination of care.  Further plan will depend on patient's progress.  ]]]]]]]]]]]]]]]]]]]  Cardiac catheterization 11/13/2021 revealed  No evidence for pulmonary hypertension is present.  Left ventricle angiogram was not performed due to pre-existing renal dysfunction.  50% mid LAD and 50 to 60% circumflex distal to the large marginal branch.    RECOMMENDATIONS:  Medical treatment.  ]]]]]]]]]]]]]]]    Vanita Chris MD  11/13/21  11:02 EST

## 2021-11-13 NOTE — PROGRESS NOTES
Hematology/Oncology Inpatient Progress Note    PATIENT NAME: Duyen Odom  : 1955  MRN: [id number]    CHIEF COMPLAINT: Symptomatic anemia    HISTORY OF PRESENT ILLNESS:  Ms. Odom was admitted with symptoms of anemia.  She also had very significant pica.  Her laboratory exams were consistent with both severe anemia and clear iron deficiency.  She has a history of a myeloproliferative disorder that was treated 25 years before this admission and she seems to have had a very good response.  At this point there is no suggestion of myeloproliferative disorder.  She has not seen her physician in more than 10 years.  She did have hematochezia sometime ago.  It lasted briefly and she underwent a colonoscopy that apparently did not disclose the source of the bleeding.  After that she became asymptomatic.     Subjective   Feels better since at the procedure done yesterday.  Bleeding from the areas of esophagitis is likely and at this very possibly explains the anemia.  She feels better after the transfusion and has had no new issues.  She is awaiting a decision on catheterization as she is well known for coronary artery disease  ROS:  Review of Systems   As above a 12 point review of systems conducted.  Pertinent positives and negatives were documented above  MEDICATIONS:    Scheduled Meds:  acetylcysteine, 600 mg, Oral, Q12H  atorvastatin, 40 mg, Oral, Nightly  pantoprazole, 40 mg, Intravenous, Q12H  sodium chloride, 10 mL, Intravenous, Q12H  sodium chloride, 3 mL, Intravenous, Q12H       Continuous Infusions:  sodium chloride, , Last Rate: 75 mL/hr (21 1105)       PRN Meds:  •  acetaminophen **OR** acetaminophen **OR** acetaminophen  •  Calcium Gluconate-NaCl **AND** calcium gluconate **AND** Calcium, Ionized  •  diphenhydrAMINE  •  fentaNYL citrate (PF)  •  lidocaine  •  magnesium sulfate **OR** magnesium sulfate **OR** magnesium sulfate  •  methylPREDNISolone sodium succinate  •  midazolam  •   "nitroglycerin  •  ondansetron **OR** ondansetron  •  predniSONE  •  [COMPLETED] Insert peripheral IV **AND** sodium chloride  •  sodium chloride  •  sodium chloride  •  sodium chloride     ALLERGIES:    Allergies   Allergen Reactions   • Iodinated Diagnostic Agents Hives     HIVES ALL OVER CHEST DURING IVP       Objective    VITALS:   /70   Pulse 68   Temp 98.1 °F (36.7 °C) (Oral)   Resp 16   Ht 152.4 cm (60\")   Wt 62.8 kg (138 lb 7.2 oz)   SpO2 99%   BMI 27.04 kg/m²     PHYSICAL EXAM: (performed by MD)  Physical Exam    Sitting up in bed.  Alert and well-oriented.  No jaundice and no pallor.  Well-hydrated.  Respirations not labored.  Lungs clear.  Heart regular and without murmur.  Abdomen soft.  No edema.  RECENT LABS:  Lab Results (last 24 hours)     Procedure Component Value Units Date/Time    CBC & Differential [[id number]]  (Abnormal) Collected: 11/13/21 0537    Specimen: Blood Updated: 11/13/21 0813    Narrative:      The following orders were created for panel order CBC & Differential.  Procedure                               Abnormality         Status                     ---------                               -----------         ------                     CBC Auto Differential[[id number]]        Abnormal            Final result               Scan Slide[[id number]]                                                                    Please view results for these tests on the individual orders.    CBC Auto Differential [[id number]]  (Abnormal) Collected: 11/13/21 0537    Specimen: Blood Updated: 11/13/21 0813     WBC 6.40 10*3/mm3      RBC 3.80 10*6/mm3      Hemoglobin 8.3 g/dL      Hematocrit 26.2 %      MCV 68.8 fL      MCH 21.8 pg      MCHC 31.6 g/dL      RDW 28.6 %      RDW-SD 65.2 fl      MPV 7.6 fL      Platelets 344 10*3/mm3      Neutrophil % 52.2 %      Lymphocyte % 31.7 %      Monocyte % 14.5 %      Eosinophil % 1.4 %      Basophil % 0.2 %      Neutrophils, Absolute 3.30 10*3/mm3      " Lymphocytes, Absolute 2.00 10*3/mm3      Monocytes, Absolute 0.90 10*3/mm3      Eosinophils, Absolute 0.10 10*3/mm3      Basophils, Absolute 0.00 10*3/mm3      nRBC 0.6 /100 WBC     Basic Metabolic Panel [[id number]]  (Abnormal) Collected: 11/13/21 0537    Specimen: Blood Updated: 11/13/21 0707     Glucose 94 mg/dL      BUN 22 mg/dL      Creatinine 1.40 mg/dL      Sodium 143 mmol/L      Potassium 4.8 mmol/L      Chloride 110 mmol/L      CO2 23.0 mmol/L      Calcium 7.6 mg/dL      eGFR Non African Amer 38 mL/min/1.73      BUN/Creatinine Ratio 15.7     Anion Gap 10.0 mmol/L     Narrative:      GFR Normal >60  Chronic Kidney Disease <60  Kidney Failure <15      Lipid Panel [[id number]] Collected: 11/13/21 0537    Specimen: Blood Updated: 11/13/21 0707     Total Cholesterol 146 mg/dL      Triglycerides 71 mg/dL      HDL Cholesterol 42 mg/dL      LDL Cholesterol  90 mg/dL      VLDL Cholesterol 14 mg/dL      LDL/HDL Ratio 2.14    Narrative:      Cholesterol Reference Ranges  (U.S. Department of Health and Human Services ATP III Classifications)    Desirable          <200 mg/dL  Borderline High    200-239 mg/dL  High Risk          >240 mg/dL      Triglyceride Reference Ranges  (U.S. Department of Health and Human Services ATP III Classifications)    Normal           <150 mg/dL  Borderline High  150-199 mg/dL  High             200-499 mg/dL  Very High        >500 mg/dL    HDL Reference Ranges  (U.S. Department of Health and Human Services ATP III Classifcations)    Low     <40 mg/dl (major risk factor for CHD)  High    >60 mg/dl ('negative' risk factor for CHD)        LDL Reference Ranges  (U.S. Department of Health and Human Services ATP III Classifcations)    Optimal          <100 mg/dL  Near Optimal     100-129 mg/dL  Borderline High  130-159 mg/dL  High             160-189 mg/dL  Very High        >189 mg/dL    Magnesium [[id number]]  (Normal) Collected: 11/13/21 0537    Specimen: Blood Updated: 11/13/21 0707     Magnesium  2.4 mg/dL     Protime-INR [[id number]]  (Normal) Collected: 11/13/21 0537    Specimen: Blood Updated: 11/13/21 0650     Protime 10.7 Seconds      INR 0.96    Hemoglobin & Hematocrit, Blood [[id number]]  (Abnormal) Collected: 11/12/21 2147    Specimen: Blood Updated: 11/12/21 2202     Hemoglobin 8.7 g/dL      Hematocrit 29.1 %     Magnesium [[id number]]  (Abnormal) Collected: 11/12/21 1836    Specimen: Blood Updated: 11/12/21 1946     Magnesium 2.5 mg/dL     THAIS + PE [[id number]]  (Abnormal) Collected: 11/11/21 1103    Specimen: Blood Updated: 11/12/21 1611     IgG 869 mg/dL      IgA 346 mg/dL      IgM 90 mg/dL      Total Protein 5.7 g/dL      Albumin 2.8 g/dL      Alpha-1-Globulin 0.3 g/dL      Alpha-2-Globulin 0.7 g/dL      Beta Globulin 1.0 g/dL      Gamma Globulin 0.9 g/dL      M-Tom Not Observed g/dL      Globulin 2.9 g/dL      A/G Ratio 1.0     Immunofixation Reflex, Serum Comment     Comment: No monoclonality detected.        Please note Comment     Comment: Protein electrophoresis scan will follow via computer, mail, or   delivery.       Narrative:      Performed at:  [address]  [id number]  : Asaf Velazco PhD, Phone:  [phone]    Folate RBC [[id number]]  (Abnormal) Collected: 11/11/21 1103    Specimen: Blood Updated: 11/12/21 1611     Folate, Hemolysate 325.0 ng/mL      Hematocrit 27.5 %      RBC Folate 1182 ng/mL     Narrative:      Performed at:  [address]  [id number]  : Asaf Velazco PhD, Phone:  [phone]        IMAGING REVIEWED:  No radiology results for the last day    Assessment/Plan   ASSESSMENT:  1. No changes for now.  I do suspect that Ms. Odom's bleeding is due to local factors and no to a coagulation disorder.  Her anemia likely will resolve once the bleeding ceases.  2. At this point she could have a coronary angiogram, as suggested necessary, from the hematological point of  view.  Her renal dysfunction may play a big role in this decision.    PLAN:  1. As above    Alex Becerra MD on November 13, 2021 at 11:39 AM

## 2021-11-13 NOTE — PROGRESS NOTES
[facility] Medicine Services Daily Progress Note    Patient Name: Duyen Odom  : 1955  MRN: [id number]  Primary Care Physician:  Ellen Red, APRN  Date of admission: 11/10/2021      Subjective      Chief Complaint: Dyspnea    2021: Patient seen and examined this morning.  Feels better compared to yesterday.  Dyspnea improving.  No dizziness or syncope.  States she has not followed up with any doctors for a while, last saw her PCP in early  and denies any history of anemia, no history of malignancy or ever had EGD/colonoscopy.    2021: Patient seen and examined this morning.  Feeling tired but overall improving.  Does admit that she was having some difficulty swallowing and some nausea at home over the past 2 weeks.  Going for EGD today.    2021: Patient seen examined this morning.  Feeling much better today, weakness is improved.  Going for cath today.    Denies any associated fever, chills, chest pain, abdominal pain, diarrhea, dysuria, hematuria, melena, hematochezia, or hematemesis.    Objective      Vitals:   Temp:  [98.1 °F (36.7 °C)-99 °F (37.2 °C)] 98.1 °F (36.7 °C)  Heart Rate:  [58-90] 65  Resp:  [13-18] 16  BP: ()/(49-90) 128/80  Flow (L/min):  [2-6] 2    Physical Exam:    General: Awake, alert, elderly female, lying in bed, NAD  Eyes: PERRL, EOMI, conjunctive are clear  Cardiovascular: Regular rate and rhythm, no murmurs  Respiratory: Clear to auscultation bilaterally, no wheezing or rales, unlabored breathing  Abdomen: Soft, nontender, positive bowel sounds, no guarding  Neurologic: A&O, CN grossly intact, moves all extremities spontaneously  Musculoskeletal: Normal range of motion, no deformities  Skin: Warm, dry, intact     Result Review    Result Review:  I have personally reviewed the results from the time of this admission to 2021 12:00 EST and agree with these findings:  [x]  Laboratory  [x]  Microbiology  [x]   Radiology  [x]  EKG/Telemetry   []  Cardiology/Vascular   []  Pathology  []  Old records  []  Other:          Assessment/Plan      Brief Patient Summary:  Duyen Odom is a 66 y.o. female w/PMH of HLD, HTN who presents to [facility] ED due to dyspnea.  Patient states dyspnea has progressively worsened over the last 2 weeks, dyspnea is worse with exertion, no aggravating or alleviating factors noted. As dyspnea did not improve she decided to go to [facility] ED. in the ED patient noted to have hemoglobin of 6.2 with mildly elevated troponin and elevated D-dimer.  CTA chest negative for PE but does show changes of lap banding previously and possible obstruction however patient not having any GI symptoms.  General surgery consulted.  Cardiology also consulted for elevated troponin.  Patient denies any ACS symptoms.  She was transfused 1 unit of PRBCs with improvement.  No signs of any GI bleed.  Hematology consulted.  General surgery and GI consulted due to the CT findings.      acetylcysteine, 600 mg, Oral, Q12H  [START ON 11/14/2021] aspirin, 81 mg, Oral, Daily  atorvastatin, 40 mg, Oral, Nightly  pantoprazole, 40 mg, Intravenous, Q12H  sodium chloride, 10 mL, Intravenous, Q12H  sodium chloride, 3 mL, Intravenous, Q12H             Active Hospital Problems:  Active Hospital Problems    Diagnosis    • **Symptomatic anemia    • Dyspnea    • Elevated troponin    • Elevated d-dimer    • History of COVID-19    • Chest tightness      Added automatically from request for surgery [id number]     • Non-STEMI (non-ST elevated myocardial infarction) (HCC)      Added automatically from request for surgery [id number]       Plan:   Dyspnea  Symptomatic MALINDA  -Dyspnea likely caused by the anemia, hemoglobin 6.2 on admission, transfused 1 unit with improvement, transfused 1 more unit on 11/12 prior to cardiac cath  -No signs of any GI bleed, FOBT ordered  -Iron panel reviewed  -Patient on room air, Covid  negative  -Hematology following, continue outpatient follow-up    Elevated D-dimer  History of gastric lap band  -CTA chest negative for PE but other findings noted  -Bilateral LE venous Doppler also ordered to rule out DVT  -s/p EGD with GI on 11/12, severe erosive esophagitis noted, lap band stable  -GI signed off    Elevated troponin  -Likely demand ischemia from anemia  -EKG reviewed, troponins elevated  -Continue statin, aspirin  -Echo shows preserved EF  -Stress test abnormal, discussed with cardiology, plan for cardiac cath today    OSMANY  -Mild bump in creatinine today, likely prerenal  -IV fluids pre and post cath  -Mucomyst per cardiology  -Monitor creatinine    DVT prophylaxis  -SCDs for now        CODE STATUS:    Level Of Support Discussed With: Patient  Code Status (Patient has no pulse and is not breathing): CPR (Attempt to Resuscitate)  Medical Interventions (Patient has pulse or is breathing): Full Support      Disposition: Home once improved    Electronically signed by Rashad Whitfield DO, 11/13/21, 12:00 EST.  [facility]ist Team

## 2021-11-14 ENCOUNTER — READMISSION MANAGEMENT (OUTPATIENT)
Dept: CALL CENTER | Facility: HOSPITAL | Age: 66
End: 2021-11-14

## 2021-11-14 VITALS
BODY MASS INDEX: 27.92 KG/M2 | OXYGEN SATURATION: 97 % | DIASTOLIC BLOOD PRESSURE: 87 MMHG | TEMPERATURE: 98.2 F | HEART RATE: 72 BPM | RESPIRATION RATE: 16 BRPM | WEIGHT: 142.2 LBS | HEIGHT: 60 IN | SYSTOLIC BLOOD PRESSURE: 137 MMHG

## 2021-11-14 LAB
ANION GAP SERPL CALCULATED.3IONS-SCNC: 12 MMOL/L (ref 5–15)
BASOPHILS # BLD AUTO: 0.1 10*3/MM3 (ref 0–0.2)
BASOPHILS NFR BLD AUTO: 0.9 % (ref 0–1.5)
BH BB BLOOD EXPIRATION DATE: NORMAL
BH BB BLOOD TYPE BARCODE: 5100
BH BB DISPENSE STATUS: NORMAL
BH BB PRODUCT CODE: NORMAL
BH BB UNIT NUMBER: NORMAL
BUN SERPL-MCNC: 18 MG/DL (ref 8–23)
BUN/CREAT SERPL: 18.8 (ref 7–25)
CALCIUM SPEC-SCNC: 7.5 MG/DL (ref 8.6–10.5)
CHLORIDE SERPL-SCNC: 108 MMOL/L (ref 98–107)
CO2 SERPL-SCNC: 19 MMOL/L (ref 22–29)
CREAT SERPL-MCNC: 0.96 MG/DL (ref 0.57–1)
CROSSMATCH INTERPRETATION: NORMAL
DEPRECATED RDW RBC AUTO: 66.9 FL (ref 37–54)
EOSINOPHIL # BLD AUTO: 0 10*3/MM3 (ref 0–0.4)
EOSINOPHIL NFR BLD AUTO: 0 % (ref 0.3–6.2)
ERYTHROCYTE [DISTWIDTH] IN BLOOD BY AUTOMATED COUNT: 29.7 % (ref 12.3–15.4)
GFR SERPL CREATININE-BSD FRML MDRD: 58 ML/MIN/1.73
GLUCOSE SERPL-MCNC: 136 MG/DL (ref 65–99)
HCT VFR BLD AUTO: 27.4 % (ref 34–46.6)
HGB BLD-MCNC: 8.6 G/DL (ref 12–15.9)
LYMPHOCYTES # BLD AUTO: 0.5 10*3/MM3 (ref 0.7–3.1)
LYMPHOCYTES NFR BLD AUTO: 6.5 % (ref 19.6–45.3)
MAGNESIUM SERPL-MCNC: 2.4 MG/DL (ref 1.6–2.4)
MCH RBC QN AUTO: 21.8 PG (ref 26.6–33)
MCHC RBC AUTO-ENTMCNC: 31.5 G/DL (ref 31.5–35.7)
MCV RBC AUTO: 69.4 FL (ref 79–97)
MONOCYTES # BLD AUTO: 0.4 10*3/MM3 (ref 0.1–0.9)
MONOCYTES NFR BLD AUTO: 6.1 % (ref 5–12)
NEUTROPHILS NFR BLD AUTO: 6 10*3/MM3 (ref 1.7–7)
NEUTROPHILS NFR BLD AUTO: 86.5 % (ref 42.7–76)
NRBC BLD AUTO-RTO: 0.3 /100 WBC (ref 0–0.2)
NT-PROBNP SERPL-MCNC: 1304 PG/ML (ref 0–900)
PLATELET # BLD AUTO: 336 10*3/MM3 (ref 140–450)
PMV BLD AUTO: 8.3 FL (ref 6–12)
POTASSIUM SERPL-SCNC: 4.6 MMOL/L (ref 3.5–5.2)
RBC # BLD AUTO: 3.94 10*6/MM3 (ref 3.77–5.28)
SODIUM SERPL-SCNC: 139 MMOL/L (ref 136–145)
UNIT  ABO: NORMAL
UNIT  RH: NORMAL
WBC # BLD AUTO: 7 10*3/MM3 (ref 3.4–10.8)

## 2021-11-14 PROCEDURE — 83735 ASSAY OF MAGNESIUM: CPT | Performed by: INTERNAL MEDICINE

## 2021-11-14 PROCEDURE — 99232 SBSQ HOSP IP/OBS MODERATE 35: CPT | Performed by: INTERNAL MEDICINE

## 2021-11-14 PROCEDURE — 85025 COMPLETE CBC W/AUTO DIFF WBC: CPT | Performed by: INTERNAL MEDICINE

## 2021-11-14 PROCEDURE — 99239 HOSP IP/OBS DSCHRG MGMT >30: CPT | Performed by: INTERNAL MEDICINE

## 2021-11-14 PROCEDURE — 80048 BASIC METABOLIC PNL TOTAL CA: CPT | Performed by: INTERNAL MEDICINE

## 2021-11-14 PROCEDURE — 83880 ASSAY OF NATRIURETIC PEPTIDE: CPT | Performed by: INTERNAL MEDICINE

## 2021-11-14 RX ORDER — ATORVASTATIN CALCIUM 40 MG/1
40 TABLET, FILM COATED ORAL NIGHTLY
Qty: 30 TABLET | Refills: 0 | Status: SHIPPED | OUTPATIENT
Start: 2021-11-14 | End: 2022-07-12

## 2021-11-14 RX ORDER — NITROGLYCERIN 0.4 MG/1
0.4 TABLET SUBLINGUAL
Qty: 100 TABLET | Refills: 0 | Status: SHIPPED | OUTPATIENT
Start: 2021-11-14

## 2021-11-14 RX ORDER — METOPROLOL SUCCINATE 25 MG/1
25 TABLET, EXTENDED RELEASE ORAL
Qty: 30 TABLET | Refills: 0 | Status: SHIPPED | OUTPATIENT
Start: 2021-11-15

## 2021-11-14 RX ORDER — PANTOPRAZOLE SODIUM 40 MG/1
40 TABLET, DELAYED RELEASE ORAL 2 TIMES DAILY
Qty: 60 TABLET | Refills: 0 | Status: SHIPPED | OUTPATIENT
Start: 2021-11-14 | End: 2022-07-12

## 2021-11-14 RX ORDER — ASPIRIN 81 MG/1
81 TABLET ORAL DAILY
Start: 2021-11-15

## 2021-11-14 RX ORDER — FERROUS SULFATE TAB EC 324 MG (65 MG FE EQUIVALENT) 324 (65 FE) MG
324 TABLET DELAYED RESPONSE ORAL
Status: DISCONTINUED | OUTPATIENT
Start: 2021-11-14 | End: 2021-11-14 | Stop reason: HOSPADM

## 2021-11-14 RX ORDER — FERROUS SULFATE TAB EC 324 MG (65 MG FE EQUIVALENT) 324 (65 FE) MG
324 TABLET DELAYED RESPONSE ORAL
Qty: 30 TABLET | Refills: 6 | Status: SHIPPED | OUTPATIENT
Start: 2021-11-14 | End: 2022-03-04

## 2021-11-14 RX ORDER — METOPROLOL SUCCINATE 25 MG/1
25 TABLET, EXTENDED RELEASE ORAL
Status: DISCONTINUED | OUTPATIENT
Start: 2021-11-14 | End: 2021-11-14 | Stop reason: HOSPADM

## 2021-11-14 RX ADMIN — PANTOPRAZOLE SODIUM 40 MG: 40 INJECTION, POWDER, FOR SOLUTION INTRAVENOUS at 08:46

## 2021-11-14 RX ADMIN — ASPIRIN 81 MG: 81 TABLET, COATED ORAL at 08:46

## 2021-11-14 RX ADMIN — METOPROLOL SUCCINATE 25 MG: 25 TABLET, EXTENDED RELEASE ORAL at 08:46

## 2021-11-14 RX ADMIN — SODIUM CHLORIDE, PRESERVATIVE FREE 10 ML: 5 INJECTION INTRAVENOUS at 08:46

## 2021-11-14 RX ADMIN — ACETAMINOPHEN 650 MG: 325 TABLET, FILM COATED ORAL at 04:57

## 2021-11-14 RX ADMIN — SODIUM CHLORIDE 100 ML/HR: 9 INJECTION, SOLUTION INTRAVENOUS at 08:48

## 2021-11-14 RX ADMIN — Medication 600 MG: at 08:46

## 2021-11-14 NOTE — PROGRESS NOTES
Referring Provider: Rashad Whitfield DO    Reason for follow-up:  Chest pain  Shortness of breath  Positive stress Cardiolite test     Patient Care Team:  Ellen Red APRN as PCP - General (Nurse Practitioner)    Subjective .  Feeling better     ROS    Since I have last seen, the patient has been without any chest discomfort ,shortness of breath, palpitations, dizziness or syncope.  Denies having any headache ,abdominal pain ,nausea, vomiting , diarrhea constipation, loss of weight or loss of appetite.  Denies having any excessive bruising ,hematuria or blood in the stool.    Review of all systems negative except as indicated    History  Past Medical History:   Diagnosis Date   • Anemia    • Chest tightness    • Elevated troponin    • High platelet count    • Hyperlipidemia    • Hypertension    • Positive D-dimer        Past Surgical History:   Procedure Laterality Date   • ABDOMINAL SURGERY      lap band   •  SECTION     • HYSTERECTOMY     • LAPAROSCOPIC GASTRIC BANDING         Family History   Problem Relation Age of Onset   • Mental illness Mother    • Hypertension Mother    • Diabetes Father    • Heart disease Father        Social History     Tobacco Use   • Smoking status: Never Smoker   • Smokeless tobacco: Never Used   Vaping Use   • Vaping Use: Never used   Substance Use Topics   • Alcohol use: Yes     Comment: occ.   • Drug use: Never        Medications Prior to Admission   Medication Sig Dispense Refill Last Dose   • azithromycin (Zithromax Z-Aneesh) 250 MG tablet Take 2 tablets by mouth on day 1, then 1 tablet daily on days 2-5 6 tablet 0 11/10/2021 at Unknown time   • benzonatate (Tessalon Perles) 100 MG capsule Take 1 capsule by mouth 3 (Three) Times a Day As Needed for Cough. 30 capsule 1 11/10/2021 at Unknown time   • predniSONE (DELTASONE) 10 MG (48) dose pack Take as directed on pack 1 each 0 11/10/2021 at Unknown time       Allergies  Iodinated diagnostic agents    Scheduled  "Meds:Acetylcysteine, 600 mg, Oral, BID  aspirin, 81 mg, Oral, Daily  atorvastatin, 40 mg, Oral, Nightly  pantoprazole, 40 mg, Intravenous, Q12H  sodium chloride, 10 mL, Intravenous, Q12H      Continuous Infusions:niCARdipine, 5-15 mg/hr, Last Rate: Stopped (11/13/21 1630)  nitroglycerin, 10-50 mcg/min, Last Rate: 15 mcg/min (11/13/21 1322)  sodium chloride, 100 mL/hr, Last Rate: 100 mL/hr (11/13/21 2139)      PRN Meds:.•  acetaminophen **OR** acetaminophen **OR** acetaminophen  •  atropine  •  Calcium Gluconate-NaCl **AND** calcium gluconate **AND** Calcium, Ionized  •  diphenhydrAMINE  •  magnesium sulfate **OR** magnesium sulfate **OR** magnesium sulfate  •  nitroglycerin  •  ondansetron **OR** ondansetron  •  ondansetron **OR** ondansetron  •  [COMPLETED] Insert peripheral IV **AND** sodium chloride  •  sodium chloride  •  sodium chloride    Objective     VITAL SIGNS  Vitals:    11/14/21 0400 11/14/21 0422 11/14/21 0500 11/14/21 0600   BP:  136/85     BP Location:  Right arm     Patient Position:  Lying     Pulse: 66 66 65 64   Resp:  15     Temp:  97.9 °F (36.6 °C)     TempSrc:  Oral     SpO2: 95% 97% 96% 95%   Weight:  64.5 kg (142 lb 3.2 oz)     Height:           Flowsheet Rows      First Filed Value   Admission Height 152.4 cm (60\") Documented at 11/10/2021 1436   Admission Weight 58.1 kg (128 lb 1.4 oz) Documented at 11/10/2021 1436            Intake/Output Summary (Last 24 hours) at 11/14/2021 0705  Last data filed at 11/14/2021 0422  Gross per 24 hour   Intake 1220 ml   Output 1325 ml   Net -105 ml        TELEMETRY: Sinus rhythm    Physical Exam:  The patient is alert, oriented and in no distress.  Vital signs as noted above.  Head and neck revealed no carotid bruits or jugular venous distention.  No thyromegaly or lymphadenopathy is present  Lungs clear.  No wheezing.  Breath sounds are normal bilaterally.  Heart normal first and second heart sounds.  No murmur. No precordial rub is present.  No gallop is " present.  Abdomen soft and nontender.  No organomegaly is present.  Extremities with good peripheral pulses without any pedal edema.  Cardiac cath site looks normal.  Skin warm and dry.  Musculoskeletal system is grossly normal  CNS grossly normal      Results Review:   I reviewed the patient's new clinical results.  Lab Results (last 24 hours)     Procedure Component Value Units Date/Time    Basic Metabolic Panel [[id number]]  (Abnormal) Collected: 11/14/21 0315    Specimen: Blood Updated: 11/14/21 0353     Glucose 136 mg/dL      BUN 18 mg/dL      Creatinine 0.96 mg/dL      Sodium 139 mmol/L      Potassium 4.6 mmol/L      Chloride 108 mmol/L      CO2 19.0 mmol/L      Calcium 7.5 mg/dL      eGFR Non African Amer 58 mL/min/1.73      BUN/Creatinine Ratio 18.8     Anion Gap 12.0 mmol/L     Narrative:      GFR Normal >60  Chronic Kidney Disease <60  Kidney Failure <15      Magnesium [[id number]]  (Normal) Collected: 11/14/21 0315    Specimen: Blood Updated: 11/14/21 0353     Magnesium 2.4 mg/dL     BNP [[id number]]  (Abnormal) Collected: 11/14/21 0315    Specimen: Blood Updated: 11/14/21 0348     proBNP 1,304.0 pg/mL     Narrative:      Among patients with dyspnea, NT-proBNP is highly sensitive for the detection of acute congestive heart failure. In addition NT-proBNP of <300 pg/ml effectively rules out acute congestive heart failure with 99% negative predictive value.    Results may be falsely decreased if patient taking Biotin.      CBC & Differential [[id number]]  (Abnormal) Collected: 11/14/21 0315    Specimen: Blood Updated: 11/14/21 0329    Narrative:      The following orders were created for panel order CBC & Differential.  Procedure                               Abnormality         Status                     ---------                               -----------         ------                     CBC Auto Differential[[id number]]        Abnormal            Final result               Scan Slide[[id number]]                                                                     Please view results for these tests on the individual orders.    CBC Auto Differential [[id number]]  (Abnormal) Collected: 11/14/21 0315    Specimen: Blood Updated: 11/14/21 0329     WBC 7.00 10*3/mm3      RBC 3.94 10*6/mm3      Hemoglobin 8.6 g/dL      Hematocrit 27.4 %      MCV 69.4 fL      MCH 21.8 pg      MCHC 31.5 g/dL      RDW 29.7 %      RDW-SD 66.9 fl      MPV 8.3 fL      Platelets 336 10*3/mm3      Neutrophil % 86.5 %      Lymphocyte % 6.5 %      Monocyte % 6.1 %      Eosinophil % 0.0 %      Basophil % 0.9 %      Neutrophils, Absolute 6.00 10*3/mm3      Lymphocytes, Absolute 0.50 10*3/mm3      Monocytes, Absolute 0.40 10*3/mm3      Eosinophils, Absolute 0.00 10*3/mm3      Basophils, Absolute 0.10 10*3/mm3      nRBC 0.3 /100 WBC     CBC & Differential [[id number]]  (Abnormal) Collected: 11/13/21 0537    Specimen: Blood Updated: 11/13/21 0813    Narrative:      The following orders were created for panel order CBC & Differential.  Procedure                               Abnormality         Status                     ---------                               -----------         ------                     CBC Auto Differential[[id number]]        Abnormal            Final result               Scan Slide[[id number]]                                                                    Please view results for these tests on the individual orders.    CBC Auto Differential [[id number]]  (Abnormal) Collected: 11/13/21 0537    Specimen: Blood Updated: 11/13/21 0813     WBC 6.40 10*3/mm3      RBC 3.80 10*6/mm3      Hemoglobin 8.3 g/dL      Hematocrit 26.2 %      MCV 68.8 fL      MCH 21.8 pg      MCHC 31.6 g/dL      RDW 28.6 %      RDW-SD 65.2 fl      MPV 7.6 fL      Platelets 344 10*3/mm3      Neutrophil % 52.2 %      Lymphocyte % 31.7 %      Monocyte % 14.5 %      Eosinophil % 1.4 %      Basophil % 0.2 %      Neutrophils, Absolute 3.30 10*3/mm3      Lymphocytes, Absolute 2.00  10*3/mm3      Monocytes, Absolute 0.90 10*3/mm3      Eosinophils, Absolute 0.10 10*3/mm3      Basophils, Absolute 0.00 10*3/mm3      nRBC 0.6 /100 WBC     Basic Metabolic Panel [[id number]]  (Abnormal) Collected: 11/13/21 0537    Specimen: Blood Updated: 11/13/21 0707     Glucose 94 mg/dL      BUN 22 mg/dL      Creatinine 1.40 mg/dL      Sodium 143 mmol/L      Potassium 4.8 mmol/L      Chloride 110 mmol/L      CO2 23.0 mmol/L      Calcium 7.6 mg/dL      eGFR Non African Amer 38 mL/min/1.73      BUN/Creatinine Ratio 15.7     Anion Gap 10.0 mmol/L     Narrative:      GFR Normal >60  Chronic Kidney Disease <60  Kidney Failure <15      Lipid Panel [[id number]] Collected: 11/13/21 0537    Specimen: Blood Updated: 11/13/21 0707     Total Cholesterol 146 mg/dL      Triglycerides 71 mg/dL      HDL Cholesterol 42 mg/dL      LDL Cholesterol  90 mg/dL      VLDL Cholesterol 14 mg/dL      LDL/HDL Ratio 2.14    Narrative:      Cholesterol Reference Ranges  (U.S. Department of Health and Human Services ATP III Classifications)    Desirable          <200 mg/dL  Borderline High    200-239 mg/dL  High Risk          >240 mg/dL      Triglyceride Reference Ranges  (U.S. Department of Health and Human Services ATP III Classifications)    Normal           <150 mg/dL  Borderline High  150-199 mg/dL  High             200-499 mg/dL  Very High        >500 mg/dL    HDL Reference Ranges  (U.S. Department of Health and Human Services ATP III Classifcations)    Low     <40 mg/dl (major risk factor for CHD)  High    >60 mg/dl ('negative' risk factor for CHD)        LDL Reference Ranges  (U.S. Department of Health and Human Services ATP III Classifcations)    Optimal          <100 mg/dL  Near Optimal     100-129 mg/dL  Borderline High  130-159 mg/dL  High             160-189 mg/dL  Very High        >189 mg/dL    Magnesium [[id number]]  (Normal) Collected: 11/13/21 0537    Specimen: Blood Updated: 11/13/21 0707     Magnesium 2.4 mg/dL           Imaging  Results (Last 24 Hours)     ** No results found for the last 24 hours. **      LAB RESULTS (LAST 7 DAYS)    CBC  Results from last 7 days   Lab Units 11/14/21 0315 11/13/21 0537 11/12/21  2147 11/12/21  0519 11/11/21  1103 11/11/21  0921 11/11/21  0116 11/11/21  0116 11/10/21  1511 11/10/21  1511 11/09/21  1534   WBC 10*3/mm3 7.00 6.40  --  11.50*  --  3.90  --   --   --  6.40 7.0   RBC 10*6/mm3 3.94 3.80  --  3.83  --  3.80  --   --   --  3.46* 3.82   HEMOGLOBIN g/dL 8.6* 8.3* 8.7* 7.6* 7.6* 7.6*  --  7.8*   < > 6.2* 6.3*   HEMATOCRIT % 27.4* 26.2* 29.1* 25.0* 24.3*  27.5* 25.1*   < > 25.7*   < > 20.7* 24.4*   MCV fL 69.4* 68.8*  --  65.3*  --  66.1*  --   --   --  59.9* 64*   PLATELETS 10*3/mm3 336 344  --  451*  --  443  --   --   --  484* 527*    < > = values in this interval not displayed.       BMP  Results from last 7 days   Lab Units 11/14/21 0315 11/13/21 0537 11/12/21  1836 11/12/21  0519 11/11/21  0116 11/10/21  1952 11/10/21  1511 11/09/21  1534   SODIUM mmol/L 139 143  --  143 139  --  138 140   POTASSIUM mmol/L 4.6 4.8  --  4.3 4.3  --  4.3 CANCELED   CHLORIDE mmol/L 108* 110*  --  109* 107  --  104 103   TOTAL CO2 mmol/L  --   --   --   --   --   --   --  20   CO2 mmol/L 19.0* 23.0  --  22.0 21.0*  --  24.0  --    BUN mg/dL 18 22  --  23 15  --  17 17   CREATININE mg/dL 0.96 1.40*  --  1.06* 0.93  --  1.22* 1.34*   GLUCOSE mg/dL 136* 94  --  90 156*  --  96 CANCELED   MAGNESIUM mg/dL 2.4 2.4 2.5* 3.1* 1.9 1.9  --   --    PHOSPHORUS mg/dL  --   --   --   --   --  3.3  --   --        CMP   Results from last 7 days   Lab Units 11/14/21  0315 11/13/21  0537 11/12/21  0519 11/11/21  1103 11/11/21  0116 11/10/21  1511 11/09/21  1534   SODIUM mmol/L 139 143 143  --  139 138 140   POTASSIUM mmol/L 4.6 4.8 4.3  --  4.3 4.3 CANCELED   CHLORIDE mmol/L 108* 110* 109*  --  107 104 103   TOTAL CO2 mmol/L  --   --   --   --   --   --  20   CO2 mmol/L 19.0* 23.0 22.0  --  21.0* 24.0  --    BUN mg/dL 18 22 23  --   15 17 17   CREATININE mg/dL 0.96 1.40* 1.06*  --  0.93 1.22* 1.34*   GLUCOSE mg/dL 136* 94 90  --  156* 96 CANCELED   ALBUMIN g/dL  --   --  3.00* 2.8*  --   --  3.8   BILIRUBIN mg/dL  --   --  0.3 0.5  --   --  0.2   ALK PHOS U/L  --   --  77  --   --   --  82   AST (SGOT) U/L  --   --  13  --   --   --  20   ALT (SGPT) U/L  --   --  6  --   --   --  9         BNP  Results from last 7 days   Lab Units 11/09/21  1534   BNP pg/mL 96.3       TROPONIN  Results from last 7 days   Lab Units 11/12/21  0519   TROPONIN T ng/mL 0.732*       CoAg  Results from last 7 days   Lab Units 11/13/21  0537 11/10/21  2123 11/10/21  1511   INR  0.96  --  0.94   APTT seconds  --  24.1  --        Creatinine Clearance  Estimated Creatinine Clearance: 49 mL/min (by C-G formula based on SCr of 0.96 mg/dL).    ABG        Radiology  No radiology results for the last day            EKG                      I personally viewed and interpreted the patient's EKG/Telemetry data: Normal sinus rhythm normal ECG    ECHOCARDIOGRAM:    Results for orders placed during the hospital encounter of 11/10/21    Adult Transthoracic Echo Complete W/ Cont if Necessary Per Protocol    Interpretation Summary  · Estimated left ventricular EF = 60% Left ventricular systolic function is normal.    Patient  Shortness of breath    Technically satisfactory study.  Mitral valve is structurally normal.  Tricuspid valve is structurally normal.  Mild tricuspid regurgitation is present.  Aortic valve is structurally normal.  Pulmonic valve could not be well visualized.  No evidence for mitral or aortic regurgitation is seen by Doppler study.  Left atrium is normal in size.  Right atrium is normal in size.  Left ventricle is normal in size and contractility with ejection fraction of 60%.  Right ventricle is normal in size.  Atrial septum is intact.  Aorta is normal.  No pericardial effusion or intracardiac thrombus is seen.    Impression  Structurally and functionally  normal cardiac valves except for mild tricuspid regurgitation.  Left ventricular size and contractility is normal with ejection fraction of 60%.          STRESS MYOVIEW:    Cardiolite (Tc-99m Sestamibi) stress test    CARDIAC CATHETERIZATION:            OTHER:         Assessment/Plan     Principal Problem:    Symptomatic anemia  Active Problems:    Chest tightness    Non-STEMI (non-ST elevated myocardial infarction) (HCC)    Dyspnea    Elevated troponin    Elevated d-dimer    History of COVID-19        ]]]]]]]]]]]]]]]]]]]]  Impression  ===========  -Progressively worsening shortness of breath.  Exertional chest discomfort suggestive of angina pectoris.     -Elevated troponin level-non-STEMI   0.899  0.799 0.499    Cardiac catheterization 2021 revealed  No evidence for pulmonary hypertension is present.  Left ventricle angiogram was not performed due to pre-existing renal dysfunction.  50% mid LAD and 50 to 60% circumflex distal to the large marginal branch.     -Hypertension dyslipidemia     -Significant anemia with hemoglobin of 6.2  No obvious GI symptoms.     -Status post lap band surgery.  CT scan of the chest revealed markedly dilated esophagus with fluid-filled to the thoracic inlet.  Status post  section hysterectomy     -Elevated D-dimer  CT with PE protocol is negative for pulmonary embolus.     -Family history is positive for coronary artery disease.     -Non-smoker     -Allergic to iodine.  ==============  Plan  =========  Patient is having exertional chest discomfort.  Patient has progressively worsening shortness of breath.  Echocardiogram 11/10/2021 revealed normal left ventricle function mild tricuspid regurgitation.  Stress Cardiolite test is abnormal-2021 with inferior and apical ischemic infarction changes.    Cardiac catheterization 2021 revealed  No evidence for pulmonary hypertension is present.  Left ventricle angiogram was not performed due to pre-existing renal  dysfunction.  50% mid LAD and 50 to 60% circumflex distal to the large marginal branch.  Medical treatment.  Patient was started on metoprolol.    Significant anemia.  Admission hemoglobin 6.2  Hemoglobin 7.6-11/12/2021 after PRBC transfusion.  8.6-11/14/2021  Not sure the exact etiology of anemia.  Hematology is investigating.    Status post lap band surgery.  CT scan of the chest revealed markedly dilated esophagus with fluid-filled to the thoracic inlet.  Appreciated Dr. Hidalgo's note.  Lap band was adjusted.    Patient had upper endoscopy 11/12/2021   esophagus:     Severe grade D erosive esophagitis with pooling of liquid in the distal esophagus.  The distal esophagus appeared cobblestoned because of the extensive erosions.  No obvious evidence of Greer's esophagus.  Stomach:         Evidence of prior lap band  Duodenum:     Normal    Have discussed with Dr. Wick gastroenterologist impression is today and he did not think there should be any significant concern for usage of Plavix or any other anticoagulants if that is needed.    Patient is allergic to contrast.  Patient will be given IV Solu-Medrol and IV Benadryl.    Renal dysfunction  1.34 11/9/2021  1.4 11/13/2021.  18/0.20-rtduyxev-68/14/2021    Okay with discharge plans from cardiovascular standpoint.    Follow-up in the office in 2 weeks.    Have discussed with attending nurse for coordination of care.    Current medications include aspirin 81 mg a day Lipitor 40 mg a day metoprolol XL 25 mg a day pantoprazole.    Further plan will depend on patient's progress.  ]]]]]]]]]]]]]]]]]]]    Vanita Chris MD  11/14/21  07:05 EST

## 2021-11-14 NOTE — DISCHARGE SUMMARY
[facility] Medicine Services  DISCHARGE SUMMARY    Patient Name: Duyen Odom  : 1955  MRN: [id number]    Date of Admission: 11/10/2021  Date of Discharge: 2021  Primary Care Physician: Ellen Red, OTILIO      Presenting Problem:   Chest tightness [R07.89]  Positive D-dimer [R79.89]  Elevated troponin [R77.8]  Anemia, unspecified type [D64.9]  Dyspnea, unspecified type [R06.00]    Active and Resolved Hospital Problems:  Active Hospital Problems    Diagnosis POA   • **Symptomatic anemia [D64.9] Yes   • Dyspnea [R06.00] Yes   • Elevated troponin [R77.8] Yes   • Elevated d-dimer [R79.89] Yes   • History of COVID-19 [Z86.16] Yes   • Chest tightness [R07.89] Unknown   • Non-STEMI (non-ST elevated myocardial infarction) (HCC) [I21.4] Unknown      Resolved Hospital Problems   No resolved problems to display.         Hospital Course     Hospital Course:  Duyen Odom is a 66 y.o. female w/PMH of HLD, HTN who presents to [facility] ED due to dyspnea.  Patient states dyspnea has progressively worsened over the last 2 weeks, dyspnea is worse with exertion, no aggravating or alleviating factors noted. As dyspnea did not improve she decided to go to [facility] ED. in the ED patient noted to have hemoglobin of 6.2 with mildly elevated troponin and elevated D-dimer.  CTA chest negative for PE but does show changes of lap banding previously and possible obstruction however patient not having any GI symptoms.  General surgery consulted.  Cardiology also consulted for elevated troponin.  Patient denies any ACS symptoms.  She was transfused 1 unit of PRBCs with improvement.  No signs of any GI bleed.  Hematology following, MALNIDA noted.  General surgery and GI consulted due to the CT findings. Patient underwent EGD with GI which showed esophagitis and gastric lap band. No active bleeding noted. Patient continued on PPI. Cardiology decided for cardiac cath and  patient was transfused 1 more unit of PRBCs precath. Cath report as noted below, medical management recommended. Patient has significantly improved and wants to go home today. Cleared to discharge per op subspecialties. Patient is stable to discharge home today with follow-up with PCP, cardiology, hematology, and GI as an outpatient.    A/P:  Dyspnea  Symptomatic MALINDA  -Dyspnea likely caused by the anemia, hemoglobin 6.2 on admission, transfused total of 2 units during this admission, hemoglobin stable  -No signs of any GI bleed  -Iron panel reviewed  -Patient on room air, Covid negative  -Okay to discharge with outpatient hematology follow-up     Elevated D-dimer  History of gastric lap band  -CTA chest negative for PE but other findings noted  -Bilateral LE venous Doppler also ordered to rule out DVT  -s/p EGD with GI on 11/12, severe erosive esophagitis noted, lap band stable  -Continue PPI twice daily  -GI signed off     Elevated troponin  -Likely demand ischemia from anemia  -EKG reviewed, troponins elevated  -Continue statin, aspirin, beta-blocker added  -Echo shows preserved EF  -Stress test abnormal, s/p LHC with report as noted, medical management recommended  -Okay to discharge with outpatient cardiology follow-up     OSMANY, resolved  -Mild bump in creatinine precath, likely prerenal  -IV fluids pre and post cath  -Mucomyst per cardiology  -Creatinine at baseline now  -Repeat BMP as outpatient      DISCHARGE Follow Up Recommendations for labs and diagnostics: Follow-up with PCP, hematology, GI, and cardiology.    Repeat CBC and BMP within 1 week.      Reasons For Change In Medications and Indications for New Medications:  Medication changes as below.    Day of Discharge     Vital Signs:  Temp:  [97.4 °F (36.3 °C)-98.7 °F (37.1 °C)] 98.2 °F (36.8 °C)  Heart Rate:  [] 72  Resp:  [11-18] 16  BP: (106-155)/(61-90) 137/87  Flow (L/min):  [2] 2    Physical Exam:  General: Awake, alert, elderly female, lying  in bed, NAD  Eyes: PERRL, EOMI, conjunctive are clear  Cardiovascular: Regular rate and rhythm, no murmurs  Respiratory: Clear to auscultation bilaterally, no wheezing or rales, unlabored breathing  Abdomen: Soft, nontender, positive bowel sounds, no guarding  Neurologic: A&O, CN grossly intact, moves all extremities spontaneously  Musculoskeletal: Normal range of motion, no deformities  Skin: Warm, dry, intact         Pertinent  and/or Most Recent Results     LAB RESULTS:      Lab 11/14/21  0315 11/13/21  0537 11/12/21  2147 11/12/21  0519 11/11/21  1103 11/11/21  0921 11/11/21  0116 11/10/21  2123 11/10/21  1952 11/10/21  1511 11/09/21  1534   WBC 7.00 6.40  --  11.50*  --  3.90  --   --   --  6.40  --    HEMOGLOBIN 8.6* 8.3* 8.7* 7.6* 7.6* 7.6*   < >  --   --  6.2*   < >   HEMATOCRIT 27.4* 26.2* 29.1* 25.0* 24.3*  27.5* 25.1*   < >  --   --  20.7*   < >   PLATELETS 336 344  --  451*  --  443  --   --   --  484*  --    NEUTROS ABS 6.00 3.30  --  8.40*  --  3.20  --   --   --  4.30  --    LYMPHS ABS 0.50* 2.00  --  1.90  --  0.60*  --   --   --  1.20  --    MONOS ABS 0.40 0.90  --  1.10*  --  0.20  --   --   --  0.70  --    EOS ABS 0.00 0.10  --  0.00  --  0.00  --   --   --  0.10  --    MCV 69.4* 68.8*  --  65.3*  --  66.1*  --   --   --  59.9*  --    PROCALCITONIN  --   --   --   --   --   --   --   --  0.06  --   --    LDH  --   --   --   --  215*  --   --   --   --   --   --    PROTIME  --  10.7  --   --   --   --   --   --   --  10.5  --    APTT  --   --   --   --   --   --   --  24.1  --   --   --     < > = values in this interval not displayed.         Lab 11/14/21  0315 11/13/21  0537 11/12/21  1836 11/12/21  0519 11/11/21 0116 11/10/21  1952 11/10/21  1952 11/10/21  1511   SODIUM 139 143  --  143 139  --   --  138   POTASSIUM 4.6 4.8  --  4.3 4.3  --   --  4.3   CHLORIDE 108* 110*  --  109* 107  --   --  104   CO2 19.0* 23.0  --  22.0 21.0*  --   --  24.0   ANION GAP 12.0 10.0  --  12.0 11.0  --   --   10.0   BUN 18 22  --  23 15  --   --  17   CREATININE 0.96 1.40*  --  1.06* 0.93  --   --  1.22*   GLUCOSE 136* 94  --  90 156*  --   --  96   CALCIUM 7.5* 7.6*  --  7.6* 7.8*  --   --  8.0*   MAGNESIUM 2.4 2.4 2.5* 3.1* 1.9   < > 1.9  --    PHOSPHORUS  --   --   --   --   --   --  3.3  --     < > = values in this interval not displayed.         Lab 11/12/21  0519 11/11/21  1103 11/09/21  1534   TOTAL PROTEIN 5.3*  --   --    ALBUMIN 3.00* 2.8* 3.8   GLOBULIN 2.3  --   --    ALT (SGPT) 6  --  9   AST (SGOT) 13  --  20   BILIRUBIN 0.3 0.5 0.2   INDIRECT BILIRUBIN  --  0.3  --    BILIRUBIN DIRECT  --  0.2  --    ALK PHOS 77  --  82         Lab 11/14/21  0315 11/13/21  0537 11/12/21  0519 11/11/21  0116 11/10/21  1952 11/10/21  1511   PROBNP 1,304.0*  --   --   --   --  1,187.0*   TROPONIN T  --   --  0.732* 0.499* 0.799* 0.899*   PROTIME  --  10.7  --   --   --  10.5   INR  --  0.96  --   --   --  0.94         Lab 11/13/21  0537   CHOLESTEROL 146   LDL CHOL 90   HDL CHOL 42   TRIGLYCERIDES 71         Lab 11/11/21  1103 11/10/21  1952 11/10/21  1544   IRON  --  14*  --    IRON SATURATION  --  3*  --    TIBC  --  435  --    TRANSFERRIN  --  292  --    FERRITIN  --  3.51*  --    FOLATE 11.80  --   --    VITAMIN B 12 320  --   --    ABO TYPING  --   --  O   RH TYPING  --   --  Positive   ANTIBODY SCREEN  --   --  Negative         Brief Urine Lab Results  (Last result in the past 365 days)      Color   Clarity   Blood   Leuk Est   Nitrite   Protein   CREAT   Urine HCG        11/11/21 1006 Yellow   Clear   Negative   Negative   Negative   Negative               Microbiology Results (last 10 days)     Procedure Component Value - Date/Time    Respiratory Panel PCR w/COVID-19(SARS-CoV-2) [facility]/JENNY/[facility] In-House, NP Swab in UTM/VTM, 3-4 HR TAT - Swab, Nasopharynx [[id number]]  (Normal) Collected: 11/10/21 1501    Lab Status: Final result Specimen: Swab from Nasopharynx Updated: 11/10/21 1559     ADENOVIRUS, PCR Not  Detected     Coronavirus 229E Not Detected     Coronavirus HKU1 Not Detected     Coronavirus NL63 Not Detected     Coronavirus OC43 Not Detected     COVID19 Not Detected     Human Metapneumovirus Not Detected     Human Rhinovirus/Enterovirus Not Detected     Influenza A PCR Not Detected     Influenza B PCR Not Detected     Parainfluenza Virus 1 Not Detected     Parainfluenza Virus 2 Not Detected     Parainfluenza Virus 3 Not Detected     Parainfluenza Virus 4 Not Detected     RSV, PCR Not Detected     Bordetella pertussis pcr Not Detected     Bordetella parapertussis PCR Not Detected     Chlamydophila pneumoniae PCR Not Detected     Mycoplasma pneumo by PCR Not Detected    Narrative:      In the setting of a positive respiratory panel with a viral infection PLUS a negative procalcitonin without other underlying concern for bacterial infection, consider observing off antibiotics or discontinuation of antibiotics and continue supportive care. If the respiratory panel is positive for atypical bacterial infection (Bordetella pertussis, Chlamydophila pneumoniae, or Mycoplasma pneumoniae), consider antibiotic de-escalation to target atypical bacterial infection.    COVID-19,APTIMA PANTHER(LAURA),[facility], NP/OP SWAB IN UTM/VTM/SALINE TRANSPORT MEDIA,24 HR TAT - Swab, Nasal Cavity [[id number]]  (Normal) Collected: 11/09/21 1404    Lab Status: Final result Specimen: Swab from Nasal Cavity Updated: 11/10/21 0424     COVID19 Not Detected    Narrative:      Fact sheet for providers: https://www.fda.gov/media/421106/download     Fact sheet for patients: https://www.fda.gov/media/847717/download    Test performed by RT PCR.          CT Abdomen Pelvis Without Contrast    Result Date: 11/11/2021  Impression: 1. Marked fluid distention of the included esophagus, status post lap band placement. This suggests an abnormally tight band resulting in obstruction. Given the degree of esophageal distention, some degree of chronicity is likely  present. 2. There is otherwise no evidence of acute disease in the abdomen or pelvis. 3. Evidence of recent contrast administration. Electronically signed by:  German Gardner M.D.  11/10/2021 10:08 PM    CT Chest Pulmonary Embolism    Result Date: 11/10/2021  Impression:  1. No acute pulmonary embolic disease. 2. The esophagus is markedly dilated and fluid-filled to the level of the thoracic inlet. The patient has had a previous gastric lap band procedure. This suggests that the gastric lap band may have too much tension obstructing the gastroesophageal junction. 3. No active pulmonary disease. 4. Additional incidental findings as noted above.  Electronically Signed By-Car Jackson MD On:11/10/2021 9:16 PM This report was finalized on [id number] by  Car Jackson MD.    XR Chest PA & Lateral    Result Date: 11/9/2021  Impression: No acute chest finding.  Electronically Signed By-Edel Glass MD On:11/9/2021 4:14 PM This report was finalized on [id number] by  Edel Glass MD.      Results for orders placed during the hospital encounter of 11/10/21    Duplex Venous Lower Extremity - Bilateral CAR    Interpretation Summary  · Normal bilateral lower extremity venous duplex scan.      Results for orders placed during the hospital encounter of 11/10/21    Duplex Venous Lower Extremity - Bilateral CAR    Interpretation Summary  · Normal bilateral lower extremity venous duplex scan.      Results for orders placed during the hospital encounter of 11/10/21    Adult Transthoracic Echo Complete W/ Cont if Necessary Per Protocol    Interpretation Summary  · Estimated left ventricular EF = 60% Left ventricular systolic function is normal.    Patient  Shortness of breath    Technically satisfactory study.  Mitral valve is structurally normal.  Tricuspid valve is structurally normal.  Mild tricuspid regurgitation is present.  Aortic valve is structurally normal.  Pulmonic valve could not be well visualized.  No evidence  for mitral or aortic regurgitation is seen by Doppler study.  Left atrium is normal in size.  Right atrium is normal in size.  Left ventricle is normal in size and contractility with ejection fraction of 60%.  Right ventricle is normal in size.  Atrial septum is intact.  Aorta is normal.  No pericardial effusion or intracardiac thrombus is seen.    Impression  Structurally and functionally normal cardiac valves except for mild tricuspid regurgitation.  Left ventricular size and contractility is normal with ejection fraction of 60%.      Labs Pending at Discharge:  Pending Labs     Order Current Status    Methylmalonic Acid, Serum In process    Methylmalonic Acid, Serum In process    Niacin (Vitamin B3) In process    Vitamin A & E In process    Vitamin B1, Whole Blood In process    Vitamin B2 In process    Vitamin K1 In process          Procedures Performed  Procedure(s):  Left Heart Cath and coronary angiogram  Right Heart Cath     SUMMARY:  No evidence for pulmonary hypertension is present.  Left ventricle angiogram was not performed due to pre-existing renal dysfunction.  50% mid LAD and 50 to 60% circumflex distal to the large marginal branch.     RECOMMENDATIONS:  Medical treatment.      EGD:  Impression:  1.  Severe erosive esophagitis  2.  Lap band     Recommendations:  1.  Pantoprazole 40 mg p.o. twice daily  2.  Iron supplementation  3.  Regular soft diet as tolerated  4.  Follow-up in my office in 2 to 3 months  5.  We will see as needed  Consults:   Consults     Date and Time Order Name Status Description    11/11/2021 12:40 PM Inpatient Gastroenterology Consult Completed     11/11/2021  7:42 AM Hematology & Oncology Inpatient Consult Completed     11/10/2021 11:14 PM Inpatient Bariatric Surgery Consult Completed     11/10/2021  7:35 PM Inpatient Cardiology Consult Completed             Discharge Details        Discharge Medications      New Medications      Instructions Start Date   aspirin 81 MG EC  tablet   81 mg, Oral, Daily   Start Date: November 15, 2021     atorvastatin 40 MG tablet  Commonly known as: LIPITOR   40 mg, Oral, Nightly      metoprolol succinate XL 25 MG 24 hr tablet  Commonly known as: TOPROL-XL   25 mg, Oral, Every 24 Hours Scheduled   Start Date: November 15, 2021     nitroglycerin 0.4 MG SL tablet  Commonly known as: NITROSTAT   0.4 mg, Sublingual, Every 5 Minutes PRN, Take no more than 3 doses in 15 minutes.      pantoprazole 40 MG EC tablet  Commonly known as: PROTONIX   40 mg, Oral, 2 Times Daily         Continue These Medications      Instructions Start Date   benzonatate 100 MG capsule  Commonly known as: Tessalon Perles   100 mg, Oral, 3 Times Daily PRN         Stop These Medications    azithromycin 250 MG tablet  Commonly known as: Zithromax Z-Aneesh     predniSONE 10 MG (48) dose pack  Commonly known as: DELTASONE            Allergies   Allergen Reactions   • Iodinated Diagnostic Agents Hives     HIVES ALL OVER CHEST DURING IVP         Discharge Disposition:  Home or Self Care    Diet:  Hospital:  Diet Order   Procedures   • Diet Cardiac; Healthy Heart         Discharge Activity:         CODE STATUS:  Code Status and Medical Interventions:   Ordered at: 11/10/21 1935     Level Of Support Discussed With:    Patient     Code Status (Patient has no pulse and is not breathing):    CPR (Attempt to Resuscitate)     Medical Interventions (Patient has pulse or is breathing):    Full Support         Future Appointments   Date Time Provider Department Center   2/9/2022  2:00 PM Ellen Red APRN [facility]           Time spent on Discharge including face to face service:  35 minutes    Signature:    Electronically signed by Rashad Whitfield DO 11/14/21, 10:18 AM EST.

## 2021-11-14 NOTE — PLAN OF CARE
Goal Outcome Evaluation:  Plan of Care Reviewed With: patient        Progress: improving  Outcome Summary: Pt being discharged. Discharge education completed. Pt verbalizes understanding and is aware of all follow-up appts. Iv removed at discharged. Pt discharging home with spouse via private vehicle.

## 2021-11-14 NOTE — PROGRESS NOTES
Hematology/Oncology Inpatient Progress Note    PATIENT NAME: Duyen Odom  : 1955  MRN: [id number]    CHIEF COMPLAINT: Symptomatic anemia    HISTORY OF PRESENT ILLNESS:  Ms. Odom was admitted with symptoms of anemia.  She also had very significant pica.  Her laboratory exams were consistent with both severe anemia and clear iron deficiency.  She has a history of a myeloproliferative disorder that was treated 25 years before this admission and she seems to have had a very good response.  At this point there is no suggestion of myeloproliferative disorder.  She has not seen her physician in more than 10 years.  She did have hematochezia sometime ago.  It lasted briefly and she underwent a colonoscopy that apparently did not disclose the source of the bleeding.  After that she became asymptomatic. On 21 She underwent a coronary angiogram. She was found to have moderate coronary artery disease.   Subjective   She feels well. She suffered no complications from the catheterization. She has had obvious bleeding.   ROS:  Review of Systems   Constitutional: Negative for activity change, appetite change, chills, diaphoresis, fatigue, fever and unexpected weight change.   HENT: Negative for congestion, dental problem, drooling, ear discharge, ear pain, facial swelling, hearing loss, mouth sores, nosebleeds, postnasal drip, rhinorrhea, sinus pressure, sinus pain, sneezing, sore throat, tinnitus, trouble swallowing and voice change.    Eyes: Negative for photophobia, pain, discharge, redness, itching and visual disturbance.   Respiratory: Negative for apnea, cough, choking, chest tightness, shortness of breath, wheezing and stridor.    Cardiovascular: Negative for chest pain, palpitations and leg swelling.   Gastrointestinal: Negative for abdominal distention, abdominal pain, anal bleeding, blood in stool, constipation, diarrhea, nausea, rectal pain and vomiting.   Endocrine: Negative for cold  intolerance, heat intolerance, polydipsia and polyuria.   Genitourinary: Negative for decreased urine volume, difficulty urinating, dysuria, flank pain, frequency, genital sores, hematuria and urgency.   Musculoskeletal: Negative for arthralgias, back pain, gait problem, joint swelling, myalgias, neck pain and neck stiffness.   Skin: Negative for color change, pallor and rash.   Neurological: Negative for dizziness, tremors, seizures, syncope, facial asymmetry, speech difficulty, weakness, light-headedness, numbness and headaches.   Hematological: Negative for adenopathy. Does not bruise/bleed easily.   Psychiatric/Behavioral: Negative for agitation, behavioral problems, confusion, decreased concentration, hallucinations, self-injury, sleep disturbance and suicidal ideas. The patient is not nervous/anxious.       MEDICATIONS:    Scheduled Meds:  Acetylcysteine, 600 mg, Oral, BID  aspirin, 81 mg, Oral, Daily  atorvastatin, 40 mg, Oral, Nightly  metoprolol succinate XL, 25 mg, Oral, Q24H  pantoprazole, 40 mg, Intravenous, Q12H  sodium chloride, 10 mL, Intravenous, Q12H     Continuous Infusions:  niCARdipine, 5-15 mg/hr, Last Rate: Stopped (11/13/21 1630)  nitroglycerin, 10-50 mcg/min, Last Rate: 15 mcg/min (11/13/21 1322)  sodium chloride, 100 mL/hr, Last Rate: 100 mL/hr (11/14/21 0848)     PRN Meds:  •  acetaminophen **OR** acetaminophen **OR** acetaminophen  •  atropine  •  Calcium Gluconate-NaCl **AND** calcium gluconate **AND** Calcium, Ionized  •  diphenhydrAMINE  •  magnesium sulfate **OR** magnesium sulfate **OR** magnesium sulfate  •  nitroglycerin  •  ondansetron **OR** ondansetron  •  ondansetron **OR** ondansetron  •  [COMPLETED] Insert peripheral IV **AND** sodium chloride  •  sodium chloride  •  sodium chloride     ALLERGIES:    Allergies   Allergen Reactions   • Iodinated Diagnostic Agents Hives     HIVES ALL OVER CHEST DURING IVP     Objective    VITALS:   /87 (BP Location: Right arm, Patient  "Position: Lying)   Pulse 72   Temp 98.2 °F (36.8 °C) (Oral)   Resp 16   Ht 152.4 cm (60\")   Wt 64.5 kg (142 lb 3.2 oz)   SpO2 97%   BMI 27.77 kg/m²     PHYSICAL EXAM: (performed by MD)  Physical Exam  Constitutional:       General: She is not in acute distress.     Appearance: Normal appearance. She is not ill-appearing, toxic-appearing or diaphoretic.   HENT:      Head: Normocephalic and atraumatic.      Right Ear: External ear normal.      Left Ear: External ear normal.      Nose: Nose normal.      Mouth/Throat:      Mouth: Mucous membranes are moist.      Pharynx: Oropharynx is clear. No oropharyngeal exudate or posterior oropharyngeal erythema.   Eyes:      General: No scleral icterus.        Right eye: No discharge.         Left eye: No discharge.      Conjunctiva/sclera: Conjunctivae normal.      Pupils: Pupils are equal, round, and reactive to light.   Cardiovascular:      Rate and Rhythm: Normal rate and regular rhythm.      Pulses: Normal pulses.      Heart sounds: No murmur heard.  No friction rub. No gallop.    Pulmonary:      Effort: No respiratory distress.      Breath sounds: No stridor. No wheezing, rhonchi or rales.   Chest:      Chest wall: No mass, lacerations, deformity, swelling or tenderness.   Breasts:      Right: Absent. No swelling, bleeding, inverted nipple, mass, nipple discharge, skin change, tenderness, axillary adenopathy or supraclavicular adenopathy.      Left: Absent. No swelling, bleeding, inverted nipple, mass, nipple discharge, skin change, tenderness, axillary adenopathy or supraclavicular adenopathy.       Abdominal:      General: Abdomen is flat. Bowel sounds are normal. There is no distension.      Palpations: Abdomen is soft. There is no mass.      Tenderness: There is no abdominal tenderness. There is no right CVA tenderness, left CVA tenderness, guarding or rebound.      Hernia: No hernia is present.   Musculoskeletal:         General: No swelling, tenderness, " deformity or signs of injury.      Cervical back: No rigidity.      Right lower leg: No edema.      Left lower leg: No edema.   Lymphadenopathy:      Cervical: No cervical adenopathy.      Upper Body:      Right upper body: No supraclavicular, axillary or pectoral adenopathy.      Left upper body: No supraclavicular, axillary or pectoral adenopathy.   Skin:     Coloration: Skin is not jaundiced.      Findings: No bruising, lesion or rash.   Neurological:      General: No focal deficit present.      Mental Status: She is alert and oriented to person, place, and time.      Cranial Nerves: No cranial nerve deficit.      Motor: No weakness.      Gait: Gait normal.   Psychiatric:         Mood and Affect: Mood normal.         Behavior: Behavior normal.         Thought Content: Thought content normal.         Judgment: Judgment normal.       Sitting up in bed.  Alert and well-oriented.  No jaundice and no pallor.  Well-hydrated.  Respirations not labored.  Lungs clear.  Heart regular and without murmur.  Abdomen soft.  No edema.  RECENT LABS:  Lab Results (last 24 hours)     Procedure Component Value Units Date/Time    Basic Metabolic Panel [[id number]]  (Abnormal) Collected: 11/14/21 0315    Specimen: Blood Updated: 11/14/21 0353     Glucose 136 mg/dL      BUN 18 mg/dL      Creatinine 0.96 mg/dL      Sodium 139 mmol/L      Potassium 4.6 mmol/L      Chloride 108 mmol/L      CO2 19.0 mmol/L      Calcium 7.5 mg/dL      eGFR Non African Amer 58 mL/min/1.73      BUN/Creatinine Ratio 18.8     Anion Gap 12.0 mmol/L     Narrative:      GFR Normal >60  Chronic Kidney Disease <60  Kidney Failure <15      Magnesium [[id number]]  (Normal) Collected: 11/14/21 0315    Specimen: Blood Updated: 11/14/21 0353     Magnesium 2.4 mg/dL     BNP [[id number]]  (Abnormal) Collected: 11/14/21 0315    Specimen: Blood Updated: 11/14/21 0348     proBNP 1,304.0 pg/mL     Narrative:      Among patients with dyspnea, NT-proBNP is highly sensitive for the  detection of acute congestive heart failure. In addition NT-proBNP of <300 pg/ml effectively rules out acute congestive heart failure with 99% negative predictive value.    Results may be falsely decreased if patient taking Biotin.      CBC & Differential [[id number]]  (Abnormal) Collected: 11/14/21 0315    Specimen: Blood Updated: 11/14/21 0329    Narrative:      The following orders were created for panel order CBC & Differential.  Procedure                               Abnormality         Status                     ---------                               -----------         ------                     CBC Auto Differential[[id number]]        Abnormal            Final result               Scan Slide[[id number]]                                                                    Please view results for these tests on the individual orders.    CBC Auto Differential [[id number]]  (Abnormal) Collected: 11/14/21 0315    Specimen: Blood Updated: 11/14/21 0329     WBC 7.00 10*3/mm3      RBC 3.94 10*6/mm3      Hemoglobin 8.6 g/dL      Hematocrit 27.4 %      MCV 69.4 fL      MCH 21.8 pg      MCHC 31.5 g/dL      RDW 29.7 %      RDW-SD 66.9 fl      MPV 8.3 fL      Platelets 336 10*3/mm3      Neutrophil % 86.5 %      Lymphocyte % 6.5 %      Monocyte % 6.1 %      Eosinophil % 0.0 %      Basophil % 0.9 %      Neutrophils, Absolute 6.00 10*3/mm3      Lymphocytes, Absolute 0.50 10*3/mm3      Monocytes, Absolute 0.40 10*3/mm3      Eosinophils, Absolute 0.00 10*3/mm3      Basophils, Absolute 0.10 10*3/mm3      nRBC 0.3 /100 WBC         IMAGING REVIEWED:  No radiology results for the last day    Assessment/Plan   ASSESSMENT:  1. Microcytic anemia. Persists with anemia and microcytosis. Will resume therapy with iron. A source of bleeding was not documented. I still believe the etiology of the anemia is gastrointestinal bleeding.   2. At this point asymptomatic from the cardiovascular point of view.   3. Will continue to follow.      PLAN:  1. As above    Alex Becerra on November 14/21 at 11:25 AM

## 2021-11-14 NOTE — OUTREACH NOTE
Prep Survey      Responses   [facility]ond[facility] facility patient discharged from? [facility]   Is LACE score < 7 ? No   Emergency Room discharge w/ pulse ox? No   Eligibility [facility]   Date of Admission 11/10/21   Date of Discharge 11/14/21   Discharge Disposition Home or Self Care   Discharge diagnosis Symptomatic anemia    Does the patient have one of the following disease processes/diagnoses(primary or secondary)? Other   Does the patient have Home health ordered? No   Is there a DME ordered? No   Prep survey completed? Yes          Sherri Malin RN

## 2021-11-15 ENCOUNTER — TELEPHONE (OUTPATIENT)
Dept: ONCOLOGY | Facility: CLINIC | Age: 66
End: 2021-11-15

## 2021-11-15 ENCOUNTER — TELEPHONE (OUTPATIENT)
Dept: FAMILY MEDICINE CLINIC | Facility: CLINIC | Age: 66
End: 2021-11-15

## 2021-11-15 ENCOUNTER — TRANSITIONAL CARE MANAGEMENT TELEPHONE ENCOUNTER (OUTPATIENT)
Dept: CALL CENTER | Facility: HOSPITAL | Age: 66
End: 2021-11-15

## 2021-11-15 NOTE — TELEPHONE ENCOUNTER
Caller: Duyen Odom    Relationship to patient: Self    Best call back number: [phone]    New or established patient?  [] New  [x] Established    Date of discharge: 11/14/21    Facility discharged from: [facility]    Diagnosis/Symptoms: ANEMIA, ELEVATED D-DIMER    Length of stay (If applicable): 5 DAYS

## 2021-11-15 NOTE — CASE MANAGEMENT/SOCIAL WORK
Case Management Discharge Note           Provided Post Acute Provider List?: N/A  Provided Post Acute Provider Quality & Resource List?: N/A    Selected Continued Care - Discharged on 11/14/2021 Admission date: 11/10/2021 - Discharge disposition: Home or Self Care                 Final Discharge Disposition Code: 01 - home or self-care

## 2021-11-15 NOTE — OUTREACH NOTE
Call Center TCM Note      Responses   [facility] patient discharged from? [facility]   Does the patient have one of the following disease processes/diagnoses(primary or secondary)? Other   TCM attempt successful? Yes   Call start time 1143   Call end time 1147   Discharge diagnosis Symptomatic anemia    Meds reviewed with patient/caregiver? Yes   Is the patient having any side effects they believe may be caused by any medication additions or changes? No   Does the patient have all medications ordered at discharge? No   What is keeping the patient from filling the prescriptions? --  [ going to  today. ]   Nursing Interventions No intervention needed,  Nurse provided patient education   Is the patient taking all medications as directed (includes completed medication regime)? No  [ going to  today]   Does the patient have a primary care provider?  Yes   Does the patient have an appointment with their PCP within 7 days of discharge? Yes   Comments regarding PCP Has a followup with Ellen Red on 11/16/2021    Has the patient kept scheduled appointments due by today? N/A   Has home health visited the patient within 72 hours of discharge? N/A   Psychosocial issues? No   Did the patient receive a copy of their discharge instructions? Yes   Nursing interventions Reviewed instructions with patient   What is the patient's perception of their health status since discharge? Improving   Is the patient/caregiver able to teach back signs and symptoms related to disease process for when to call PCP? Yes   Is the patient/caregiver able to teach back signs and symptoms related to disease process for when to call 911? Yes   Is the patient/caregiver able to teach back the hierarchy of who to call/visit for symptoms/problems? PCP, Specialist, Home health nurse, Urgent Care, ED, 911 Yes   If the patient is a current smoker, are they able to teach back resources for cessation? Not a smoker   TCM call  completed? Yes          Fadi Greenfield RN    11/15/2021, 11:47 EST

## 2021-11-16 ENCOUNTER — OFFICE VISIT (OUTPATIENT)
Dept: FAMILY MEDICINE CLINIC | Facility: CLINIC | Age: 66
End: 2021-11-16

## 2021-11-16 VITALS
BODY MASS INDEX: 26.86 KG/M2 | SYSTOLIC BLOOD PRESSURE: 150 MMHG | HEIGHT: 60 IN | OXYGEN SATURATION: 95 % | DIASTOLIC BLOOD PRESSURE: 80 MMHG | WEIGHT: 136.8 LBS | HEART RATE: 54 BPM | TEMPERATURE: 98.6 F

## 2021-11-16 DIAGNOSIS — R07.9 CHEST PAIN, UNSPECIFIED TYPE: ICD-10-CM

## 2021-11-16 DIAGNOSIS — D64.9 SYMPTOMATIC ANEMIA: Primary | ICD-10-CM

## 2021-11-16 DIAGNOSIS — K21.00 GASTROESOPHAGEAL REFLUX DISEASE WITH ESOPHAGITIS WITHOUT HEMORRHAGE: ICD-10-CM

## 2021-11-16 LAB
Lab: NORMAL
Lab: NORMAL
METHYLMALONATE SERPL-SCNC: 257 NMOL/L (ref 0–378)
METHYLMALONATE SERPL-SCNC: 261 NMOL/L (ref 0–378)
VIT B2 BLD-MCNC: 241 UG/L (ref 137–370)

## 2021-11-16 PROCEDURE — 99495 TRANSJ CARE MGMT MOD F2F 14D: CPT | Performed by: NURSE PRACTITIONER

## 2021-11-16 PROCEDURE — 1111F DSCHRG MED/CURRENT MED MERGE: CPT | Performed by: NURSE PRACTITIONER

## 2021-11-16 NOTE — PROGRESS NOTES
"Subjective   Duyen Odom is a 66 y.o. female presents for   Chief Complaint   Patient presents with   • Transitional Care Management       Health Maintenance Due   Topic Date Due   • MAMMOGRAM  Never done   • DXA SCAN  Never done   • COLORECTAL CANCER SCREENING  Never done   • COVID-19 Vaccine (1) Never done   • TDAP/TD VACCINES (1 - Tdap) Never done   • ZOSTER VACCINE (1 of 2) Never done   • INFLUENZA VACCINE  Never done   • Pneumococcal Vaccine 65+ (1 of 1 - PPSV23) Never done   • HEPATITIS C SCREENING  Never done   • ANNUAL WELLNESS VISIT  Never done       History of Present Illness   Pt present for hospital follow up.  She was recently in [facility] from 11/10/21-11/14/21.  She was initially seen in the office 11/9/21 with complaints of shortness of air and chest pain.  Labs revealed elevated D-dimer and significant anemia.  CT of her chest was negative for a PE, but her gastric band had shifted and was pushing up on her lungs, and EGD indicated erosive esophagitis.  She received 2 units of prbcs and states she is feeling better.  Gastric band was also adjusted to allow healing of her esophagus.  She states she is experiencing intermittent reflux and discussed dietary changes with her to help sx.      Vitals:    11/16/21 1120 11/16/21 1122   BP: 156/83 150/80   BP Location: Left arm Right arm   Patient Position: Sitting Sitting   Cuff Size: Adult Adult   Pulse: 54    Temp: 98.6 °F (37 °C)    TempSrc: Temporal    SpO2: 95%    Weight: 62.1 kg (136 lb 12.8 oz)    Height: 152 cm (59.84\")      Body mass index is 26.86 kg/m².    Current Outpatient Medications on File Prior to Visit   Medication Sig Dispense Refill   • aspirin 81 MG EC tablet Take 1 tablet by mouth Daily.     • atorvastatin (LIPITOR) 40 MG tablet Take 1 tablet by mouth Every Night. 30 tablet 0   • ferrous sulfate 324 (65 Fe) MG tablet delayed-release EC tablet Take 1 tablet by mouth Daily With Breakfast. 30 tablet 6   • metoprolol succinate XL " (TOPROL-XL) 25 MG 24 hr tablet Take 1 tablet by mouth Daily. 30 tablet 0   • nitroglycerin (NITROSTAT) 0.4 MG SL tablet Place 1 tablet under the tongue Every 5 (Five) Minutes As Needed for Chest Pain (Only if SBP Greater Than 100). Take no more than 3 doses in 15 minutes. 100 tablet 0   • pantoprazole (PROTONIX) 40 MG EC tablet Take 1 tablet by mouth 2 (Two) Times a Day. 60 tablet 0   • benzonatate (Tessalon Perles) 100 MG capsule Take 1 capsule by mouth 3 (Three) Times a Day As Needed for Cough. 30 capsule 1     No current facility-administered medications on file prior to visit.       The following portions of the patient's history were reviewed and updated as appropriate: allergies, current medications, past family history, past medical history, past social history, past surgical history, and problem list.    Review of Systems   Constitutional: Positive for fatigue. Negative for chills and fever.   HENT: Negative for sinus pressure and sore throat.    Eyes: Negative for blurred vision.   Respiratory: Negative for cough and shortness of breath.    Cardiovascular: Negative for chest pain.   Gastrointestinal: Positive for GERD (intermittent). Negative for abdominal pain.   Endocrine: Negative.    Genitourinary: Negative.    Musculoskeletal: Negative for arthralgias and joint swelling.   Skin: Negative for color change.   Allergic/Immunologic: Negative.    Neurological: Negative for dizziness.   Hematological: Negative.    Psychiatric/Behavioral: Negative for behavioral problems.       Objective   Physical Exam  Vitals and nursing note reviewed.   Constitutional:       Appearance: Normal appearance. She is well-developed.   HENT:      Head: Normocephalic and atraumatic.      Right Ear: Tympanic membrane, ear canal and external ear normal.      Left Ear: Tympanic membrane, ear canal and external ear normal.      Nose: Nose normal.   Eyes:      Extraocular Movements: Extraocular movements intact.       Conjunctiva/sclera: Conjunctivae normal.      Pupils: Pupils are equal, round, and reactive to light.   Cardiovascular:      Rate and Rhythm: Normal rate and regular rhythm.      Pulses: Normal pulses.      Heart sounds: Normal heart sounds.   Pulmonary:      Effort: Pulmonary effort is normal.      Breath sounds: Normal breath sounds.   Abdominal:      General: Bowel sounds are normal.      Palpations: Abdomen is soft.   Genitourinary:     Vagina: Normal.   Musculoskeletal:         General: Normal range of motion.      Cervical back: Normal range of motion and neck supple.   Skin:     General: Skin is warm and dry.      Coloration: Skin is pale.   Neurological:      General: No focal deficit present.      Mental Status: She is alert and oriented to person, place, and time.   Psychiatric:         Mood and Affect: Mood normal.         Behavior: Behavior normal.         Thought Content: Thought content normal.         Judgment: Judgment normal.       PHQ-9 Total Score: 0    Assessment/Plan   Diagnoses and all orders for this visit:    1. Symptomatic anemia (Primary)  Comments:  improving, follow up with hematology next week    2. Gastroesophageal reflux disease with esophagitis without hemorrhage  Comments:  continue protonix and follow up with GI as directed.  pt instructed to eat small meals/snacks throughout the day, avoid caffeine and carbonation    3. Chest pain, unspecified type  Comments:  resolved, follow up with cardiology as directed due to elevated troponin and abnormal tests        Patient Instructions   Gastroesophageal Reflux Disease, Adult    Gastroesophageal reflux (JOHANA) happens when acid from the stomach flows up into the tube that connects the mouth and the stomach (esophagus). Normally, food travels down the esophagus and stays in the stomach to be digested. With JOHANA, food and stomach acid sometimes move back up into the esophagus.  You may have a disease called gastroesophageal reflux disease  (GERD) if the reflux:  · Happens often.  · Causes frequent or very bad symptoms.  · Causes problems such as damage to the esophagus.  When this happens, the esophagus becomes sore and swollen. Over time, GERD can make small holes (ulcers) in the lining of the esophagus.  What are the causes?  This condition is caused by a problem with the muscle between the esophagus and the stomach. When this muscle is weak or not normal, it does not close properly to keep food and acid from coming back up from the stomach.  The muscle can be weak because of:  · Tobacco use.  · Pregnancy.  · Having a certain type of hernia (hiatal hernia).  · Alcohol use.  · Certain foods and drinks, such as coffee, chocolate, onions, and peppermint.  What increases the risk?  · Being overweight.  · Having a disease that affects your connective tissue.  · Taking NSAIDs, such a ibuprofen.  What are the signs or symptoms?  · Heartburn.  · Difficult or painful swallowing.  · The feeling of having a lump in the throat.  · A bitter taste in the mouth.  · Bad breath.  · Having a lot of saliva.  · Having an upset or bloated stomach.  · Burping.  · Chest pain. Different conditions can cause chest pain. Make sure you see your doctor if you have chest pain.  · Shortness of breath or wheezing.  · A long-term cough or a cough at night.  · Wearing away of the surface of teeth (tooth enamel).  · Weight loss.  How is this treated?  · Making changes to your diet.  · Taking medicine.  · Having surgery.  Treatment will depend on how bad your symptoms are.  Follow these instructions at home:  Eating and drinking    · Follow a diet as told by your doctor. You may need to avoid foods and drinks such as:  ? Coffee and tea, with or without caffeine.  ? Drinks that contain alcohol.  ? Energy drinks and sports drinks.  ? Bubbly (carbonated) drinks or sodas.  ? Chocolate and cocoa.  ? Peppermint and mint flavorings.  ? Garlic and onions.  ? Horseradish.  ? Spicy and acidic  foods. These include peppers, chili powder, rosas powder, vinegar, hot sauces, and BBQ sauce.  ? Citrus fruit juices and citrus fruits, such as oranges, phoenix, and limes.  ? Tomato-based foods. These include red sauce, chili, salsa, and pizza with red sauce.  ? Fried and fatty foods. These include donuts, french fries, potato chips, and high-fat dressings.  ? High-fat meats. These include hot dogs, rib eye steak, sausage, ham, and munoz.  ? High-fat dairy items, such as whole milk, butter, and cream cheese.  · Eat small meals often. Avoid eating large meals.  · Avoid drinking large amounts of liquid with your meals.  · Avoid eating meals during the 2-3 hours before bedtime.  · Avoid lying down right after you eat.  · Do not exercise right after you eat.    Lifestyle    · Do not smoke or use any products that contain nicotine or tobacco. If you need help quitting, ask your doctor.  · Try to lower your stress. If you need help doing this, ask your doctor.  · If you are overweight, lose an amount of weight that is healthy for you. Ask your doctor about a safe weight loss goal.    General instructions  · Pay attention to any changes in your symptoms.  · Take over-the-counter and prescription medicines only as told by your doctor.  · Do not take aspirin, ibuprofen, or other NSAIDs unless your doctor says it is okay.  · Wear loose clothes. Do not wear anything tight around your waist.  · Raise (elevate) the head of your bed about 6 inches (15 cm). You may need to use a wedge to do this.  · Avoid bending over if this makes your symptoms worse.  · Keep all follow-up visits.  Contact a doctor if:  · You have new symptoms.  · You lose weight and you do not know why.  · You have trouble swallowing or it hurts to swallow.  · You have wheezing or a cough that keeps happening.  · You have a hoarse voice.  · Your symptoms do not get better with treatment.  Get help right away if:  · You have sudden pain in your arms, neck, jaw,  teeth, or back.  · You suddenly feel sweaty, dizzy, or light-headed.  · You have chest pain or shortness of breath.  · You vomit and the vomit is green, yellow, or black, or it looks like blood or coffee grounds.  · You faint.  · Your poop (stool) is red, bloody, or black.  · You cannot swallow, drink, or eat.  These symptoms may represent a serious problem that is an emergency. Do not wait to see if the symptoms will go away. Get medical help right away. Call your local emergency services (911 in the [place] Do not drive yourself to the hospital.  Summary  · If a person has gastroesophageal reflux disease (GERD), food and stomach acid move back up into the esophagus and cause symptoms or problems such as damage to the esophagus.  · Treatment will depend on how bad your symptoms are.  · Follow a diet as told by your doctor.  · Take all medicines only as told by your doctor.  This information is not intended to replace advice given to you by your health care provider. Make sure you discuss any questions you have with your health care provider.  Document Revised: 06/28/2021 Document Reviewed: 06/28/2021  [email or web address] Patient Education © 2021 [employer]    Iron Deficiency Anemia, Adult  Iron deficiency anemia is when you do not have enough red blood cells or hemoglobin in your blood. This happens because you have too little iron in your body. Hemoglobin carries oxygen to parts of the body. Anemia can cause your body to not get enough oxygen.  What are the causes?  · Not eating enough foods that have iron in them.  · The body not being able to take in iron well.  · Needing more iron due to pregnancy or heavy menstrual periods, for females.  · Cancer.  · Bleeding in the bowels.  · Many blood draws.  What increases the risk?  · Being pregnant.  · Being a teenage girl going through a growth spurt.  What are the signs or symptoms?  · Pale skin, lips, and nails.  · Weakness, dizziness, and getting tired  easily.  · Headache.  · Feeling like you cannot breathe well when moving (shortness of breath).  · Cold hands and feet.  · Fast heartbeat or a heartbeat that is not regular.  · Feeling grouchy (irritable) or breathing fast. These are more common in very bad anemia.  Mild anemia may not cause any symptoms.  How is this treated?  This condition is treated by finding out why you do not have enough iron and then getting more iron. It may include:  · Adding foods to your diet that have a lot of iron.  · Taking iron pills (supplements). If you are pregnant or breastfeeding, you may need to take extra iron. Your diet often does not provide the amount of iron that you need.  · Getting more vitamin C in your diet. Vitamin C helps your body take in iron. You may need to take iron pills with a glass of orange juice or vitamin C pills.  · Medicines to make heavy menstrual periods lighter.  · Surgery.  You may need blood tests to see if treatment is working. If the treatment does not seem to be working, you may need more tests.  Follow these instructions at home:  Medicines  · Take over-the-counter and prescription medicines only as told by your doctor. This includes iron pills and vitamins.  ? Take iron pills when your stomach is empty. If you cannot handle this, take them with food.  ? Do not drink milk or take antacids at the same time as your iron pills.  ? Iron pills may turn your poop (stool)black.  · If you cannot handle taking iron pills by mouth, ask your doctor about getting iron through:  ? An IV tube.  ? A shot (injection) into a muscle.  Eating and drinking    · Talk with your doctor before changing the foods you eat. He or she may tell you to eat foods that have a lot of iron, such as:  ? Liver.  ? Low-fat (lean) beef.  ? Breads and cereals that have iron added to them.  ? Eggs.  ? Dried fruit.  ? Dark green, leafy vegetables.  · Eat fresh fruits and vegetables that are high in vitamin C. They help your body use  iron. Foods with a lot of vitamin C include:  ? Oranges.  ? Peppers.  ? Tomatoes.  ? Mangoes.  · Drink enough fluid to keep your pee (urine) pale yellow.    Managing constipation  If you are taking iron pills, they may cause trouble pooping (constipation). To prevent or treat trouble pooping, you may need to:  · Take over-the-counter or prescription medicines.  · Eat foods that are high in fiber. These include beans, whole grains, and fresh fruits and vegetables.  · Limit foods that are high in fat and sugar. These include fried or sweet foods.  General instructions  · Return to your normal activities as told by your doctor. Ask your doctor what activities are safe for you.  · Keep yourself clean, and keep things clean around you.  · Keep all follow-up visits as told by your doctor. This is important.  Contact a doctor if:  · You feel like you may vomit (nauseous), or you vomit.  · You feel weak.  · You are sweating for no reason.  · You have trouble pooping, such as:  ? Pooping less than 3 times a week.  ? Straining to poop.  ? Having poop that is hard, dry, or larger than normal.  ? Feeling full or bloated.  ? Pain in the lower belly.  ? Not feeling better after pooping.  Get help right away if:  · You pass out (faint).  · You have chest pain.  · You have trouble breathing that:  ? Is very bad.  ? Gets worse with physical activity.  · You have a fast heartbeat, or a heartbeat that does not feel regular.  · You get light-headed when getting up from sitting or lying down.  These symptoms may be an emergency. Do not wait to see if the symptoms will go away. Get medical help right away. Call your local emergency services (911 in the [place] Do not drive yourself to the hospital.  Summary  · Iron deficiency anemia is when you have too little iron in your body.  · This condition is treated by finding out why you do not have enough iron in your body and then getting more iron.  · Take over-the-counter and prescription  medicines only as told by your doctor.  · Eat fresh fruits and vegetables that are high in vitamin C.  · Get help right away if you cannot breathe well.  This information is not intended to replace advice given to you by your health care provider. Make sure you discuss any questions you have with your health care provider.  Document Revised: 08/25/2020 Document Reviewed: 08/25/2020  [employer] Patient Education © 2021 [employer]

## 2021-11-16 NOTE — PATIENT INSTRUCTIONS
Gastroesophageal Reflux Disease, Adult    Gastroesophageal reflux (JOHANA) happens when acid from the stomach flows up into the tube that connects the mouth and the stomach (esophagus). Normally, food travels down the esophagus and stays in the stomach to be digested. With JOHANA, food and stomach acid sometimes move back up into the esophagus.  You may have a disease called gastroesophageal reflux disease (GERD) if the reflux:  · Happens often.  · Causes frequent or very bad symptoms.  · Causes problems such as damage to the esophagus.  When this happens, the esophagus becomes sore and swollen. Over time, GERD can make small holes (ulcers) in the lining of the esophagus.  What are the causes?  This condition is caused by a problem with the muscle between the esophagus and the stomach. When this muscle is weak or not normal, it does not close properly to keep food and acid from coming back up from the stomach.  The muscle can be weak because of:  · Tobacco use.  · Pregnancy.  · Having a certain type of hernia (hiatal hernia).  · Alcohol use.  · Certain foods and drinks, such as coffee, chocolate, onions, and peppermint.  What increases the risk?  · Being overweight.  · Having a disease that affects your connective tissue.  · Taking NSAIDs, such a ibuprofen.  What are the signs or symptoms?  · Heartburn.  · Difficult or painful swallowing.  · The feeling of having a lump in the throat.  · A bitter taste in the mouth.  · Bad breath.  · Having a lot of saliva.  · Having an upset or bloated stomach.  · Burping.  · Chest pain. Different conditions can cause chest pain. Make sure you see your doctor if you have chest pain.  · Shortness of breath or wheezing.  · A long-term cough or a cough at night.  · Wearing away of the surface of teeth (tooth enamel).  · Weight loss.  How is this treated?  · Making changes to your diet.  · Taking medicine.  · Having surgery.  Treatment will depend on how bad your symptoms are.  Follow these  instructions at home:  Eating and drinking    · Follow a diet as told by your doctor. You may need to avoid foods and drinks such as:  ? Coffee and tea, with or without caffeine.  ? Drinks that contain alcohol.  ? Energy drinks and sports drinks.  ? Bubbly (carbonated) drinks or sodas.  ? Chocolate and cocoa.  ? Peppermint and mint flavorings.  ? Garlic and onions.  ? Horseradish.  ? Spicy and acidic foods. These include peppers, chili powder, rosas powder, vinegar, hot sauces, and BBQ sauce.  ? Citrus fruit juices and citrus fruits, such as oranges, phoenix, and limes.  ? Tomato-based foods. These include red sauce, chili, salsa, and pizza with red sauce.  ? Fried and fatty foods. These include donuts, french fries, potato chips, and high-fat dressings.  ? High-fat meats. These include hot dogs, rib eye steak, sausage, ham, and munoz.  ? High-fat dairy items, such as whole milk, butter, and cream cheese.  · Eat small meals often. Avoid eating large meals.  · Avoid drinking large amounts of liquid with your meals.  · Avoid eating meals during the 2-3 hours before bedtime.  · Avoid lying down right after you eat.  · Do not exercise right after you eat.    Lifestyle    · Do not smoke or use any products that contain nicotine or tobacco. If you need help quitting, ask your doctor.  · Try to lower your stress. If you need help doing this, ask your doctor.  · If you are overweight, lose an amount of weight that is healthy for you. Ask your doctor about a safe weight loss goal.    General instructions  · Pay attention to any changes in your symptoms.  · Take over-the-counter and prescription medicines only as told by your doctor.  · Do not take aspirin, ibuprofen, or other NSAIDs unless your doctor says it is okay.  · Wear loose clothes. Do not wear anything tight around your waist.  · Raise (elevate) the head of your bed about 6 inches (15 cm). You may need to use a wedge to do this.  · Avoid bending over if this makes  your symptoms worse.  · Keep all follow-up visits.  Contact a doctor if:  · You have new symptoms.  · You lose weight and you do not know why.  · You have trouble swallowing or it hurts to swallow.  · You have wheezing or a cough that keeps happening.  · You have a hoarse voice.  · Your symptoms do not get better with treatment.  Get help right away if:  · You have sudden pain in your arms, neck, jaw, teeth, or back.  · You suddenly feel sweaty, dizzy, or light-headed.  · You have chest pain or shortness of breath.  · You vomit and the vomit is green, yellow, or black, or it looks like blood or coffee grounds.  · You faint.  · Your poop (stool) is red, bloody, or black.  · You cannot swallow, drink, or eat.  These symptoms may represent a serious problem that is an emergency. Do not wait to see if the symptoms will go away. Get medical help right away. Call your local emergency services (911 in the [place] Do not drive yourself to the hospital.  Summary  · If a person has gastroesophageal reflux disease (GERD), food and stomach acid move back up into the esophagus and cause symptoms or problems such as damage to the esophagus.  · Treatment will depend on how bad your symptoms are.  · Follow a diet as told by your doctor.  · Take all medicines only as told by your doctor.  This information is not intended to replace advice given to you by your health care provider. Make sure you discuss any questions you have with your health care provider.  Document Revised: 06/28/2021 Document Reviewed: 06/28/2021  [employer] Patient Education © 2021 [employer]    Iron Deficiency Anemia, Adult  Iron deficiency anemia is when you do not have enough red blood cells or hemoglobin in your blood. This happens because you have too little iron in your body. Hemoglobin carries oxygen to parts of the body. Anemia can cause your body to not get enough oxygen.  What are the causes?  · Not eating enough foods that have iron in them.  · The  body not being able to take in iron well.  · Needing more iron due to pregnancy or heavy menstrual periods, for females.  · Cancer.  · Bleeding in the bowels.  · Many blood draws.  What increases the risk?  · Being pregnant.  · Being a teenage girl going through a growth spurt.  What are the signs or symptoms?  · Pale skin, lips, and nails.  · Weakness, dizziness, and getting tired easily.  · Headache.  · Feeling like you cannot breathe well when moving (shortness of breath).  · Cold hands and feet.  · Fast heartbeat or a heartbeat that is not regular.  · Feeling grouchy (irritable) or breathing fast. These are more common in very bad anemia.  Mild anemia may not cause any symptoms.  How is this treated?  This condition is treated by finding out why you do not have enough iron and then getting more iron. It may include:  · Adding foods to your diet that have a lot of iron.  · Taking iron pills (supplements). If you are pregnant or breastfeeding, you may need to take extra iron. Your diet often does not provide the amount of iron that you need.  · Getting more vitamin C in your diet. Vitamin C helps your body take in iron. You may need to take iron pills with a glass of orange juice or vitamin C pills.  · Medicines to make heavy menstrual periods lighter.  · Surgery.  You may need blood tests to see if treatment is working. If the treatment does not seem to be working, you may need more tests.  Follow these instructions at home:  Medicines  · Take over-the-counter and prescription medicines only as told by your doctor. This includes iron pills and vitamins.  ? Take iron pills when your stomach is empty. If you cannot handle this, take them with food.  ? Do not drink milk or take antacids at the same time as your iron pills.  ? Iron pills may turn your poop (stool)black.  · If you cannot handle taking iron pills by mouth, ask your doctor about getting iron through:  ? An IV tube.  ? A shot (injection) into a  muscle.  Eating and drinking    · Talk with your doctor before changing the foods you eat. He or she may tell you to eat foods that have a lot of iron, such as:  ? Liver.  ? Low-fat (lean) beef.  ? Breads and cereals that have iron added to them.  ? Eggs.  ? Dried fruit.  ? Dark green, leafy vegetables.  · Eat fresh fruits and vegetables that are high in vitamin C. They help your body use iron. Foods with a lot of vitamin C include:  ? Oranges.  ? Peppers.  ? Tomatoes.  ? Mangoes.  · Drink enough fluid to keep your pee (urine) pale yellow.    Managing constipation  If you are taking iron pills, they may cause trouble pooping (constipation). To prevent or treat trouble pooping, you may need to:  · Take over-the-counter or prescription medicines.  · Eat foods that are high in fiber. These include beans, whole grains, and fresh fruits and vegetables.  · Limit foods that are high in fat and sugar. These include fried or sweet foods.  General instructions  · Return to your normal activities as told by your doctor. Ask your doctor what activities are safe for you.  · Keep yourself clean, and keep things clean around you.  · Keep all follow-up visits as told by your doctor. This is important.  Contact a doctor if:  · You feel like you may vomit (nauseous), or you vomit.  · You feel weak.  · You are sweating for no reason.  · You have trouble pooping, such as:  ? Pooping less than 3 times a week.  ? Straining to poop.  ? Having poop that is hard, dry, or larger than normal.  ? Feeling full or bloated.  ? Pain in the lower belly.  ? Not feeling better after pooping.  Get help right away if:  · You pass out (faint).  · You have chest pain.  · You have trouble breathing that:  ? Is very bad.  ? Gets worse with physical activity.  · You have a fast heartbeat, or a heartbeat that does not feel regular.  · You get light-headed when getting up from sitting or lying down.  These symptoms may be an emergency. Do not wait to see if  the symptoms will go away. Get medical help right away. Call your local emergency services (911 in the [place] Do not drive yourself to the hospital.  Summary  · Iron deficiency anemia is when you have too little iron in your body.  · This condition is treated by finding out why you do not have enough iron in your body and then getting more iron.  · Take over-the-counter and prescription medicines only as told by your doctor.  · Eat fresh fruits and vegetables that are high in vitamin C.  · Get help right away if you cannot breathe well.  This information is not intended to replace advice given to you by your health care provider. Make sure you discuss any questions you have with your health care provider.  Document Revised: 08/25/2020 Document Reviewed: 08/25/2020  [employer] Patient Education © 2021 [employer]

## 2021-11-17 LAB
PHYTONADIONE SERPL-MCNC: NORMAL NG/L
VIT B1 BLD-SCNC: 115.8 NMOL/L (ref 66.5–200)

## 2021-11-20 LAB
A-TOCOPHEROL VIT E SERPL-MCNC: 8 MG/L (ref 9–29)
GAMMA TOCOPHEROL SERPL-MCNC: 1.4 MG/L (ref 0.5–4.9)
VIT A SERPL-MCNC: 33.4 UG/DL (ref 22–69.5)

## 2021-11-22 ENCOUNTER — READMISSION MANAGEMENT (OUTPATIENT)
Dept: CALL CENTER | Facility: HOSPITAL | Age: 66
End: 2021-11-22

## 2021-11-22 NOTE — OUTREACH NOTE
Medical Week 2 Survey      Responses   [facility] patient discharged from? [facility]   Does the patient have one of the following disease processes/diagnoses(primary or secondary)? Other   Week 2 attempt successful? No   Unsuccessful attempts Attempt 1          Lexus Everett LPN

## 2021-11-23 ENCOUNTER — OFFICE VISIT (OUTPATIENT)
Dept: ONCOLOGY | Facility: CLINIC | Age: 66
End: 2021-11-23

## 2021-11-23 ENCOUNTER — APPOINTMENT (OUTPATIENT)
Dept: LAB | Facility: HOSPITAL | Age: 66
End: 2021-11-23

## 2021-11-23 ENCOUNTER — PATIENT ROUNDING (BHMG ONLY) (OUTPATIENT)
Dept: FAMILY MEDICINE CLINIC | Facility: CLINIC | Age: 66
End: 2021-11-23

## 2021-11-23 ENCOUNTER — READMISSION MANAGEMENT (OUTPATIENT)
Dept: CALL CENTER | Facility: HOSPITAL | Age: 66
End: 2021-11-23

## 2021-11-23 VITALS
TEMPERATURE: 97.7 F | BODY MASS INDEX: 28.58 KG/M2 | OXYGEN SATURATION: 97 % | HEIGHT: 60 IN | RESPIRATION RATE: 18 BRPM | WEIGHT: 145.6 LBS | SYSTOLIC BLOOD PRESSURE: 133 MMHG | DIASTOLIC BLOOD PRESSURE: 75 MMHG | HEART RATE: 64 BPM

## 2021-11-23 DIAGNOSIS — R79.89 ELEVATED D-DIMER: Primary | ICD-10-CM

## 2021-11-23 LAB
BASOPHILS # BLD AUTO: 0.04 10*3/MM3 (ref 0–0.2)
BASOPHILS NFR BLD AUTO: 0.6 % (ref 0–1.5)
DEPRECATED RDW RBC AUTO: 76.6 FL (ref 37–54)
EOSINOPHIL # BLD AUTO: 0.18 10*3/MM3 (ref 0–0.4)
EOSINOPHIL NFR BLD AUTO: 2.6 % (ref 0.3–6.2)
ERYTHROCYTE [DISTWIDTH] IN BLOOD BY AUTOMATED COUNT: 30.6 % (ref 12.3–15.4)
HCT VFR BLD AUTO: 33.3 % (ref 34–46.6)
HGB BLD-MCNC: 9.8 G/DL (ref 12–15.9)
LYMPHOCYTES # BLD AUTO: 1.42 10*3/MM3 (ref 0.7–3.1)
LYMPHOCYTES NFR BLD AUTO: 20.6 % (ref 19.6–45.3)
MCH RBC QN AUTO: 22 PG (ref 26.6–33)
MCHC RBC AUTO-ENTMCNC: 29.4 G/DL (ref 31.5–35.7)
MCV RBC AUTO: 74.8 FL (ref 79–97)
MONOCYTES # BLD AUTO: 0.75 10*3/MM3 (ref 0.1–0.9)
MONOCYTES NFR BLD AUTO: 10.9 % (ref 5–12)
NEUTROPHILS NFR BLD AUTO: 4.49 10*3/MM3 (ref 1.7–7)
NEUTROPHILS NFR BLD AUTO: 65.3 % (ref 42.7–76)
PLATELET # BLD AUTO: 231 10*3/MM3 (ref 140–450)
PMV BLD AUTO: 9.8 FL (ref 6–12)
RBC # BLD AUTO: 4.45 10*6/MM3 (ref 3.77–5.28)
WBC NRBC COR # BLD: 6.88 10*3/MM3 (ref 3.4–10.8)

## 2021-11-23 PROCEDURE — 99213 OFFICE O/P EST LOW 20 MIN: CPT | Performed by: INTERNAL MEDICINE

## 2021-11-23 PROCEDURE — 85025 COMPLETE CBC W/AUTO DIFF WBC: CPT | Performed by: INTERNAL MEDICINE

## 2021-11-23 RX ORDER — FUROSEMIDE 20 MG/1
20 TABLET ORAL DAILY
Qty: 7 TABLET | Refills: 0 | Status: SHIPPED | OUTPATIENT
Start: 2021-11-23 | End: 2022-05-20

## 2021-11-23 NOTE — OUTREACH NOTE
Medical Week 2 Survey      Responses   [facility] patient discharged from? [facility]   Does the patient have one of the following disease processes/diagnoses(primary or secondary)? Other   Week 2 attempt successful? Yes   Call start time 1417   Rescheduled Rescheduled-pt requested  [ states she is at a dr appt.]   Call end time 1418          Lexus Everett LPN

## 2021-11-24 ENCOUNTER — READMISSION MANAGEMENT (OUTPATIENT)
Dept: CALL CENTER | Facility: HOSPITAL | Age: 66
End: 2021-11-24

## 2021-11-24 LAB
NIACIN SERPL-MCNC: <5 NG/ML (ref 0–5)
NICOTINAMIDE SERPL-MCNC: 13.4 NG/ML (ref 5.2–72.1)

## 2021-11-24 NOTE — OUTREACH NOTE
Medical Week 2 Survey      Responses   [facility] patient discharged from? [facility]   Does the patient have one of the following disease processes/diagnoses(primary or secondary)? Other   Week 2 attempt successful? No   Unsuccessful attempts Attempt 2          Sonia Owens LPN

## 2021-11-29 ENCOUNTER — READMISSION MANAGEMENT (OUTPATIENT)
Dept: CALL CENTER | Facility: HOSPITAL | Age: 66
End: 2021-11-29

## 2021-11-29 NOTE — OUTREACH NOTE
Medical Week 2 Survey      Responses   [facility] patient discharged from? [facility]   Does the patient have one of the following disease processes/diagnoses(primary or secondary)? Other   Week 2 attempt successful? Yes   Call start time 0953   Discharge diagnosis Symptomatic anemia    Call end time 1001   Is patient permission given to speak with other caregiver? No   Meds reviewed with patient/caregiver? Yes   Is the patient having any side effects they believe may be caused by any medication additions or changes? No   Does the patient have all medications ordered at discharge? Yes   Is the patient taking all medications as directed (includes completed medication regime)? Yes   Medication comments She had 7 days of lasix.  Her feet are swollen both of them . It is from the knee downs to the toes. She noticed a little sob. She noticed some SOB. Advised to call PCP, which she has done. Also sent a message in My chart If does not hear back from them go to urgent care, or it gets worse go to the ED.   Does the patient have a primary care provider?  Yes   Does the patient have an appointment with their PCP within 7 days of discharge? Yes   Has the patient kept scheduled appointments due by today? Yes   Has home health visited the patient within 72 hours of discharge? N/A   Psychosocial issues? No   Did the patient receive a copy of their discharge instructions? Yes   Nursing interventions Reviewed instructions with patient  [Call , Go to urgent caretoday or ED. ]   What is the patient's perception of their health status since discharge? New symptoms unrelated to diagnosis  [She has swelling from knee down in both legs. Slight amount of SOB- She is awaiting a call back from provider- She also send a message to my chart. Advised if does not hear back from providers go to Urgent care , if worsens ED. ]   Is the patient/caregiver able to teach back signs and symptoms related to disease process for when to call PCP? Yes    Is the patient/caregiver able to teach back signs and symptoms related to disease process for when to call 911? Yes   Is the patient/caregiver able to teach back the hierarchy of who to call/visit for symptoms/problems? PCP, Specialist, Home health nurse, Urgent Care, ED, 911 Yes   Additional teach back comments If does not hear from provider regarding swelling and slight sob go to Urgent care or the ED today.    Week 2 Call Completed? Yes          Tosin Choi RN

## 2021-12-01 ENCOUNTER — OFFICE VISIT (OUTPATIENT)
Dept: CARDIOLOGY | Facility: CLINIC | Age: 66
End: 2021-12-01

## 2021-12-01 VITALS
SYSTOLIC BLOOD PRESSURE: 143 MMHG | DIASTOLIC BLOOD PRESSURE: 85 MMHG | OXYGEN SATURATION: 98 % | BODY MASS INDEX: 28.47 KG/M2 | HEIGHT: 60 IN | HEART RATE: 79 BPM | WEIGHT: 145 LBS

## 2021-12-01 DIAGNOSIS — R07.89 CHEST DISCOMFORT: Primary | ICD-10-CM

## 2021-12-01 DIAGNOSIS — I25.10 CHRONIC CORONARY ARTERY DISEASE: ICD-10-CM

## 2021-12-01 PROCEDURE — 99214 OFFICE O/P EST MOD 30 MIN: CPT | Performed by: INTERNAL MEDICINE

## 2021-12-01 NOTE — PROGRESS NOTES
Encounter Date:12/01/2021  Last seen 11/14/2021      Patient ID: Duyen Odom is a 66 y.o. female.    Chief Complaint:  Shortness of breath  Chest discomfort  Non-STEMI  Chronic coronary artery disease  Hypertension  Dyslipidemia      History of Present Illness  Patient recently was admitted to [facility] with significant shortness of breath and had non-STEMI.  Cardiac catheterization revealed borderline coronary artery disease and was discharged home.  Patient has significant anemia and patient was scoped by GI and was discharged home.  Her recent hemoglobin was 9 g.  Since I have last seen, the patient has been without any chest discomfort , unusual shortness of breath, palpitations, dizziness or syncope.  Denies having any headache ,abdominal pain ,nausea, vomiting , diarrhea constipation, loss of weight or loss of appetite.  Denies having any excessive bruising ,hematuria or blood in the stool.    Review of all systems negative except as indicated.    Reviewed ROS.    Assessment and Plan       ]]]]]]]]]]]]]]]]]]]]  Impression  ===========  -Progressively worsening shortness of breath.-Better  Exertional chest discomfort suggestive of angina pectoris.     -Elevated troponin level-non-STEMI   0.899  0.799 0.499    Echocardiogram 11/10/2021 revealed normal left ventricle function mild tricuspid regurgitation.  Stress Cardiolite test is abnormal-11/11/2021 with inferior and apical ischemic infarction changes.    Cardiac catheterization 11/13/2021 revealed  No evidence for pulmonary hypertension is present.  Left ventricle angiogram was not performed due to pre-existing renal dysfunction.  50% mid LAD and 50 to 60% circumflex distal to the large marginal branch.     -Hypertension dyslipidemia     -Significant anemia with hemoglobin of 6.2  No obvious GI symptoms.     -Status post lap band surgery.  CT scan of the chest revealed markedly dilated esophagus with fluid-filled to the thoracic inlet.  Status  post  section hysterectomy     -Elevated D-dimer  CT with PE protocol is negative for pulmonary embolus.     -Family history is positive for coronary artery disease.     -Non-smoker     -Allergic to iodine.  ==============  Plan  =========  Patient recently was admitted to [facility] with significant shortness of breath and had non-STEMI.  Cardiac catheterization revealed borderline coronary artery disease and was discharged home.  Patient has significant anemia and patient was scoped by GI and was discharged home.  Her recent hemoglobin was 9 g.  Shortness of breath is better.     Significant anemia.  Admission hemoglobin 6.2  Hemoglobin 7.6-2021 after PRBC transfusion.  8.6-2021  Not sure the exact etiology of anemia.  Hematology is investigating.  Patient hemoglobin 9 g.     Status post lap band surgery.  CT scan of the chest revealed markedly dilated esophagus with fluid-filled to the thoracic inlet.  Appreciated Dr. Hidalgo's note.  Lap band was adjusted.     Patient had upper endoscopy 2021   esophagus:     Severe grade D erosive esophagitis with pooling of liquid in the distal esophagus.  The distal esophagus appeared cobblestoned because of the extensive erosions.  No obvious evidence of Greer's esophagus.  Stomach:         Evidence of prior lap band  Duodenum:     Normal      Renal dysfunction  1.34 2021  1.4 2021.  18/0.24-yavukyle-26/14/2021    Chronic nonpitting lower extremity edema-probably noncardiac.     Current medications include aspirin 81 mg a day Lipitor 40 mg a day metoprolol XL 25 mg a day pantoprazole.    Follow-up in the office in 3 months.     Further plan will depend on patient's progress.  ]]]]]]]]]]]]]]]]]]]           Diagnosis Plan   1. Chest discomfort     2. Chronic coronary artery disease     LAB RESULTS (LAST 7 DAYS)    CBC        BMP        CMP         BNP        TROPONIN        CoAg        Creatinine Clearance  Estimated Creatinine  Clearance: 49.4 mL/min (by C-G formula based on SCr of 0.96 mg/dL).    ABG        Radiology  No radiology results for the last day                The following portions of the patient's history were reviewed and updated as appropriate: allergies, current medications, past family history, past medical history, past social history, past surgical history and problem list.    Review of Systems   Constitutional: Negative for malaise/fatigue.   Cardiovascular: Positive for leg swelling and palpitations. Negative for chest pain and syncope.   Respiratory: Positive for shortness of breath.    Skin: Negative for rash.   Gastrointestinal: Negative for nausea and vomiting.   Neurological: Positive for dizziness and light-headedness. Negative for numbness.   All other systems reviewed and are negative.        Current Outpatient Medications:   •  aspirin 81 MG EC tablet, Take 1 tablet by mouth Daily., Disp: , Rfl:   •  atorvastatin (LIPITOR) 40 MG tablet, Take 1 tablet by mouth Every Night., Disp: 30 tablet, Rfl: 0  •  ferrous sulfate 324 (65 Fe) MG tablet delayed-release EC tablet, Take 1 tablet by mouth Daily With Breakfast., Disp: 30 tablet, Rfl: 6  •  furosemide (Lasix) 20 MG tablet, Take 1 tablet by mouth Daily., Disp: 7 tablet, Rfl: 0  •  metoprolol succinate XL (TOPROL-XL) 25 MG 24 hr tablet, Take 1 tablet by mouth Daily., Disp: 30 tablet, Rfl: 0  •  nitroglycerin (NITROSTAT) 0.4 MG SL tablet, Place 1 tablet under the tongue Every 5 (Five) Minutes As Needed for Chest Pain (Only if SBP Greater Than 100). Take no more than 3 doses in 15 minutes., Disp: 100 tablet, Rfl: 0  •  pantoprazole (PROTONIX) 40 MG EC tablet, Take 1 tablet by mouth 2 (Two) Times a Day., Disp: 60 tablet, Rfl: 0    Allergies   Allergen Reactions   • Iodinated Diagnostic Agents Hives     HIVES ALL OVER CHEST DURING IVP       Family History   Problem Relation Age of Onset   • Mental illness Mother    • Hypertension Mother    • Diabetes Father    • Heart  "disease Father        Past Surgical History:   Procedure Laterality Date   • ABDOMINAL SURGERY      lap band   • CARDIAC CATHETERIZATION N/A 2021    Procedure: Left Heart Cath and coronary angiogram;  Surgeon: Vanita Chris MD;  Location: [facility] CATH INVASIVE LOCATION;  Service: Cardiovascular;  Laterality: N/A;   • CARDIAC CATHETERIZATION N/A 2021    Procedure: Right Heart Cath;  Surgeon: Vanita Chris MD;  Location: [facility] CATH INVASIVE LOCATION;  Service: Cardiovascular;  Laterality: N/A;   •  SECTION     • ENDOSCOPY N/A 2021    Procedure: ESOPHAGOGASTRODUODENOSCOPY;  Surgeon: Austin Wick MD;  Location: [facility] ENDOSCOPY;  Service: Gastroenterology;  Laterality: N/A;  Post: sever erosive esophagitis, lap band   • HYSTERECTOMY     • LAPAROSCOPIC GASTRIC BANDING         Past Medical History:   Diagnosis Date   • Anemia    • Chest tightness    • Elevated troponin    • High platelet count    • Hyperlipidemia    • Hypertension    • Positive D-dimer        Family History   Problem Relation Age of Onset   • Mental illness Mother    • Hypertension Mother    • Diabetes Father    • Heart disease Father        Social History     Socioeconomic History   • Marital status:    Tobacco Use   • Smoking status: Never Smoker   • Smokeless tobacco: Never Used   Vaping Use   • Vaping Use: Never used   Substance and Sexual Activity   • Alcohol use: Yes     Comment: occ.   • Drug use: Never   • Sexual activity: Defer         Procedures      Objective:       Physical Exam    /85 (BP Location: Right arm, Patient Position: Sitting, Cuff Size: Adult)   Pulse 79   Ht 152.4 cm (60\")   Wt 65.8 kg (145 lb)   LMP  (LMP Unknown)   SpO2 98%   BMI 28.32 kg/m²   The patient is alert, oriented and in no distress.    Vital signs as noted above.    Head and neck revealed no carotid bruits or jugular venous distension.  No thyromegaly or lymphadenopathy is present.    Lungs clear.  No " wheezing.  Breath sounds are normal bilaterally.    Heart normal first and second heart sounds.  No murmur..  No pericardial rub is present.  No gallop is present.    Abdomen soft and nontender.  No organomegaly is present.    Extremities revealed good peripheral pulses.  Nonpitting edema.    Skin warm and dry.    Musculoskeletal system is grossly normal.    CNS grossly normal.    Reviewed and unchanged from last visit.

## 2021-12-08 ENCOUNTER — READMISSION MANAGEMENT (OUTPATIENT)
Dept: CALL CENTER | Facility: HOSPITAL | Age: 66
End: 2021-12-08

## 2021-12-08 NOTE — OUTREACH NOTE
Medical Week 3 Survey      Responses   [facility] patient discharged from? [facility]   Does the patient have one of the following disease processes/diagnoses(primary or secondary)? Other   Week 3 attempt successful? No   Unsuccessful attempts Attempt 1          Sonia Owens LPN

## 2021-12-10 ENCOUNTER — OFFICE (OUTPATIENT)
Dept: URBAN - METROPOLITAN AREA CLINIC 64 | Facility: CLINIC | Age: 66
End: 2021-12-10

## 2021-12-10 VITALS
HEIGHT: 60 IN | HEART RATE: 78 BPM | SYSTOLIC BLOOD PRESSURE: 98 MMHG | DIASTOLIC BLOOD PRESSURE: 53 MMHG | WEIGHT: 148 LBS

## 2021-12-10 DIAGNOSIS — D50.9 IRON DEFICIENCY ANEMIA, UNSPECIFIED: ICD-10-CM

## 2021-12-10 PROCEDURE — 99214 OFFICE O/P EST MOD 30 MIN: CPT | Performed by: NURSE PRACTITIONER

## 2021-12-13 ENCOUNTER — READMISSION MANAGEMENT (OUTPATIENT)
Dept: CALL CENTER | Facility: HOSPITAL | Age: 66
End: 2021-12-13

## 2021-12-13 NOTE — OUTREACH NOTE
Medical Week 3 Survey      Responses   [facility] patient discharged from? [facility]   Does the patient have one of the following disease processes/diagnoses(primary or secondary)? Other   Week 3 attempt successful? Yes   Call start time 1630   Call end time 1632   Meds reviewed with patient/caregiver? Yes   Is the patient having any side effects they believe may be caused by any medication additions or changes? No   Does the patient have all medications ordered at discharge? Yes   Is the patient taking all medications as directed (includes completed medication regime)? Yes   Does the patient have a primary care provider?  Yes   Has the patient kept scheduled appointments due by today? Yes   Has home health visited the patient within 72 hours of discharge? N/A   Psychosocial issues? No   Did the patient receive a copy of their discharge instructions? Yes   Nursing interventions Reviewed instructions with patient   What is the patient's perception of their health status since discharge? Improving   Is the patient/caregiver able to teach back signs and symptoms related to disease process for when to call PCP? Yes   Is the patient/caregiver able to teach back signs and symptoms related to disease process for when to call 911? Yes   Is the patient/caregiver able to teach back the hierarchy of who to call/visit for symptoms/problems? PCP, Specialist, Home health nurse, Urgent Care, ED, 911 Yes   If the patient is a current smoker, are they able to teach back resources for cessation? Not a smoker   Week 3 Call Completed? Yes   Is the patient interested in additional calls from an ambulatory ?  NOTE:  applies to high risk patients requiring additional follow-up. No   Revoked No further contact(revokes)-requires comment   Graduated/Revoked comments PATIENT STATES SHE IS DOING WELL AND NO NEED FOR FOURTH CALL.           Sonia Owens LPN

## 2022-01-17 ENCOUNTER — TELEPHONE (OUTPATIENT)
Dept: ONCOLOGY | Facility: CLINIC | Age: 67
End: 2022-01-17

## 2022-01-17 ENCOUNTER — LAB (OUTPATIENT)
Dept: LAB | Facility: HOSPITAL | Age: 67
End: 2022-01-17

## 2022-01-17 ENCOUNTER — APPOINTMENT (OUTPATIENT)
Dept: LAB | Facility: HOSPITAL | Age: 67
End: 2022-01-17

## 2022-01-17 DIAGNOSIS — D64.9 SYMPTOMATIC ANEMIA: ICD-10-CM

## 2022-01-17 LAB
BASOPHILS # BLD AUTO: 0.05 10*3/MM3 (ref 0–0.2)
BASOPHILS NFR BLD AUTO: 0.8 % (ref 0–1.5)
DEPRECATED RDW RBC AUTO: 70.2 FL (ref 37–54)
EOSINOPHIL # BLD AUTO: 0.22 10*3/MM3 (ref 0–0.4)
EOSINOPHIL NFR BLD AUTO: 3.6 % (ref 0.3–6.2)
ERYTHROCYTE [DISTWIDTH] IN BLOOD BY AUTOMATED COUNT: 22.8 % (ref 12.3–15.4)
FERRITIN SERPL-MCNC: 16.96 NG/ML (ref 13–150)
HCT VFR BLD AUTO: 34.3 % (ref 34–46.6)
HGB BLD-MCNC: 10.5 G/DL (ref 12–15.9)
IRON 24H UR-MRATE: 21 MCG/DL (ref 37–145)
IRON SATN MFR SERPL: 5 % (ref 20–50)
LYMPHOCYTES # BLD AUTO: 1.42 10*3/MM3 (ref 0.7–3.1)
LYMPHOCYTES NFR BLD AUTO: 23.4 % (ref 19.6–45.3)
MCH RBC QN AUTO: 25.9 PG (ref 26.6–33)
MCHC RBC AUTO-ENTMCNC: 30.6 G/DL (ref 31.5–35.7)
MCV RBC AUTO: 84.7 FL (ref 79–97)
MONOCYTES # BLD AUTO: 0.74 10*3/MM3 (ref 0.1–0.9)
MONOCYTES NFR BLD AUTO: 12.2 % (ref 5–12)
NEUTROPHILS NFR BLD AUTO: 3.64 10*3/MM3 (ref 1.7–7)
NEUTROPHILS NFR BLD AUTO: 60 % (ref 42.7–76)
PLATELET # BLD AUTO: 344 10*3/MM3 (ref 140–450)
PMV BLD AUTO: 8.3 FL (ref 6–12)
RBC # BLD AUTO: 4.05 10*6/MM3 (ref 3.77–5.28)
TIBC SERPL-MCNC: 410 MCG/DL (ref 298–536)
TRANSFERRIN SERPL-MCNC: 275 MG/DL (ref 200–360)
WBC NRBC COR # BLD: 6.07 10*3/MM3 (ref 3.4–10.8)

## 2022-01-17 PROCEDURE — 36415 COLL VENOUS BLD VENIPUNCTURE: CPT

## 2022-01-17 PROCEDURE — 83540 ASSAY OF IRON: CPT

## 2022-01-17 PROCEDURE — 85025 COMPLETE CBC W/AUTO DIFF WBC: CPT

## 2022-01-17 PROCEDURE — 82728 ASSAY OF FERRITIN: CPT

## 2022-01-17 PROCEDURE — 84466 ASSAY OF TRANSFERRIN: CPT

## 2022-01-17 NOTE — TELEPHONE ENCOUNTER
PATIENT STATES SHE NEEDS TO COME IN THIS AFTERNOON FOR HER LABS.  SHE IS SCHEDULED FOR 2:00PM TODAY.  SHE V/U.

## 2022-01-17 NOTE — TELEPHONE ENCOUNTER
Caller: Duyen Odom    Relationship to patient: Self    Best call back number: [phone]    Type of visit: LAB    Requested date: PLEASE CALL TO R/S.    If rescheduling, when is the original appointment: 01/17    Additional notes: HUB UNABLE TO REACH WARM TRANSFER.

## 2022-01-20 NOTE — PROGRESS NOTES
HEMATOLOGY ONCOLOGY FOLLOW UP        Patient name: Duyen Odom  : 1955  MRN: [id number]  Primary Care Physician: Ellen Red APRN  Referring Physician: Ellen Red APRN      Duyen Odom is a 66 y.o. female who presented to [facility] on 11/10/2021 with complaints of shortness of breath that has progressively gotten worse over the last 2 weeks. Dyspnea is accompanied with fever, cough, chest tightness and BLE. Patient was called by PCP to go to ED after receiving elevated d-dimer that was drawn in the office.  Patient was seen in PCP office yesterday for cough, weakness, shortness of breath, some chest discomfort and leg swelling. Patient was started Z-jaja and prednisone with Robintussin for cough.  Patient has been afebrile for 48 hours.  Patient has history of pulmonary embolism years ago after kidney stone surgery, was started on anticoagulation temporarily afterwards.  Denies black tarry stools, hematochezia or hematemesis.       Upon arrival to the ED vitals temp 99.4, , RR 16, /74, O2 sat 100% on room air.  Labs notable for troponin 0 0.899, proBNP 1187, glucose 96, , K4.3, CO2 24, creatinine 1.22, BUN 17, GFR 44, WBC 6.4, Hgb 6.2, platelets 484, neutrophils 67.5.  PCR panel negative.  Covid test negative.  EKG shows sinus rhythm rate 79, low voltage extremity and precordial leads.  Chest x-ray shows no acute chest findings.  Patient treated in the ED with IV Benadryl, 125 mg of IV Solu-Medrol, 1000 mL NS bolus. Chart review shows patient presented to PCP 1 day prior to admission with complaints of loss of taste, loss of smell with prior Covid diagnosis in 2021, cough, fever, sore throat with positive Covid contact 3 weeks prior, symptoms present for 9 days.  Patient has not been vaccinated     21  Hematology/Oncology was consulted for symptomatic anemia.  Hemoglobin 6.2, received 1 unit of PRBC's. CT PE protocol showed no  "acute PE.  The esophagus is markedly dilated and fluid filled to the level of thoracic inlet.  Patient patient had a previous gastric lap band procedure.  This suggest that the gastric lap band may have too much tension obstructing the gastroesophageal junction.   consulted for evaluation and management of possible GE junction obstruction.  Denies history of malignancy or ever having EGD/colonoscopy.     Patient was noted to have demand ischemia, troponins were elevated patient had a left heart cath with no intervention needed.  Patient eventually discharged.  Hemoglobin eventually improved to 8.6.  Patient was given IV Ferrlecit in the hospital    1/24/2022 Ms. Odom was in the office for follow-up.  She had undergone a coronary angiogram that reported some coronary artery disease but no need for angioplasty.  Following discharge from the hospital she did not feel like taking iron anymore she wanted to get it \"from natural sources\" and had not taken any of the prescribed oral iron.  Following a discussion she accepted to receive oral iron; she preferred to stay away from intravenous iron.    Subjective   She was feeling fatigued but otherwise was okay.  She reported good appetite and had not lost weight.  She was afebrile.  She had not had any chest pains and had not had palpitations, dyspnea or other cardiorespiratory symptoms.  She denied abdominal pain or diarrhea and no dysuria.  No skin rash.  No neurologic symptoms.     The following portions of the patient's history were reviewed and updated as appropriate: allergies, current medications, past family history, past medical history, past social history, past surgical history and problem list.      Past Medical History:   Diagnosis Date   • Anemia    • Chest tightness    • Elevated troponin    • High platelet count    • Hyperlipidemia    • Hypertension    • Positive D-dimer        Past Surgical History:   Procedure Laterality Date   • ABDOMINAL SURGERY "      lap band   • CARDIAC CATHETERIZATION N/A 2021    Procedure: Left Heart Cath and coronary angiogram;  Surgeon: Vanita Chris MD;  Location: [facility] CATH INVASIVE LOCATION;  Service: Cardiovascular;  Laterality: N/A;   • CARDIAC CATHETERIZATION N/A 2021    Procedure: Right Heart Cath;  Surgeon: Vanita Chris MD;  Location: [facility] CATH INVASIVE LOCATION;  Service: Cardiovascular;  Laterality: N/A;   •  SECTION     • ENDOSCOPY N/A 2021    Procedure: ESOPHAGOGASTRODUODENOSCOPY;  Surgeon: Austin Wick MD;  Location: [facility] ENDOSCOPY;  Service: Gastroenterology;  Laterality: N/A;  Post: sever erosive esophagitis, lap band   • HYSTERECTOMY     • LAPAROSCOPIC GASTRIC BANDING         Current Outpatient Medications:   •  aspirin 81 MG EC tablet, Take 1 tablet by mouth Daily., Disp: , Rfl:   •  atorvastatin (LIPITOR) 40 MG tablet, Take 1 tablet by mouth Every Night., Disp: 30 tablet, Rfl: 0  •  ferrous sulfate 324 (65 Fe) MG tablet delayed-release EC tablet, Take 1 tablet by mouth Daily With Breakfast., Disp: 30 tablet, Rfl: 6  •  furosemide (Lasix) 20 MG tablet, Take 1 tablet by mouth Daily., Disp: 7 tablet, Rfl: 0  •  metoprolol succinate XL (TOPROL-XL) 25 MG 24 hr tablet, Take 1 tablet by mouth Daily., Disp: 30 tablet, Rfl: 0  •  nitroglycerin (NITROSTAT) 0.4 MG SL tablet, Place 1 tablet under the tongue Every 5 (Five) Minutes As Needed for Chest Pain (Only if SBP Greater Than 100). Take no more than 3 doses in 15 minutes., Disp: 100 tablet, Rfl: 0  •  pantoprazole (PROTONIX) 40 MG EC tablet, Take 1 tablet by mouth 2 (Two) Times a Day., Disp: 60 tablet, Rfl: 0    Allergies   Allergen Reactions   • Iodinated Diagnostic Agents Hives     HIVES ALL OVER CHEST DURING IVP       Family History   Problem Relation Age of Onset   • Mental illness Mother    • Hypertension Mother    • Diabetes Father    • Heart disease Father        Cancer-related family history is not on file.    Social  History     Tobacco Use   • Smoking status: Never Smoker   • Smokeless tobacco: Never Used   Vaping Use   • Vaping Use: Never used   Substance Use Topics   • Alcohol use: Yes     Comment: occ.   • Drug use: Never     Social History     Social History Narrative   • Not on file        I have reviewed the history of present illness, past medical history, family history, social history, lab results, all notes and other records since the patient was last seen on  11/15/2021.    ROS:   Review of Systems   Constitutional: Positive for fatigue. Negative for activity change, appetite change, chills, diaphoresis, fever and unexpected weight change.   HENT: Negative for congestion, dental problem, drooling, ear discharge, ear pain, facial swelling, hearing loss, mouth sores, nosebleeds, postnasal drip, rhinorrhea, sinus pressure, sinus pain, sneezing, sore throat, tinnitus, trouble swallowing and voice change.    Eyes: Negative for photophobia, pain, discharge, redness, itching and visual disturbance.   Respiratory: Negative for apnea, cough, choking, chest tightness, shortness of breath, wheezing and stridor.    Cardiovascular: Negative for chest pain, palpitations and leg swelling.   Gastrointestinal: Negative for abdominal distention, abdominal pain, anal bleeding, blood in stool, constipation, diarrhea, nausea, rectal pain and vomiting.   Endocrine: Negative for cold intolerance, heat intolerance, polydipsia and polyuria.   Genitourinary: Negative for decreased urine volume, difficulty urinating, dysuria, flank pain, frequency, genital sores, hematuria and urgency.   Musculoskeletal: Negative for arthralgias, back pain, gait problem, joint swelling, myalgias, neck pain and neck stiffness.   Skin: Negative for color change, pallor and rash.   Neurological: Negative for dizziness, tremors, seizures, syncope, facial asymmetry, speech difficulty, weakness, light-headedness, numbness and headaches.   Hematological: Negative for  adenopathy. Does not bruise/bleed easily.   Psychiatric/Behavioral: Negative for agitation, behavioral problems, confusion, decreased concentration, hallucinations, self-injury, sleep disturbance and suicidal ideas. The patient is not nervous/anxious.        I have reviewed and confirmed the accuracy of the ROS as documented by the MA/LPN/RN Alaina Shaw MA    Objective:  Vital signs:  There were no vitals filed for this visit.  There is no height or weight on file to calculate BMI.  ECOG  (0) Fully active, able to carry on all predisease performance without restriction    Physical Exam:   Physical Exam  Constitutional:       General: She is not in acute distress.     Appearance: Normal appearance. She is not ill-appearing, toxic-appearing or diaphoretic.   HENT:      Head: Normocephalic and atraumatic.      Right Ear: External ear normal.      Left Ear: External ear normal.      Nose: Nose normal.      Mouth/Throat:      Mouth: Mucous membranes are moist.      Pharynx: Oropharynx is clear. No oropharyngeal exudate or posterior oropharyngeal erythema.   Eyes:      General: No scleral icterus.        Right eye: No discharge.         Left eye: No discharge.      Conjunctiva/sclera: Conjunctivae normal.      Pupils: Pupils are equal, round, and reactive to light.   Cardiovascular:      Rate and Rhythm: Normal rate and regular rhythm.      Pulses: Normal pulses.      Heart sounds: No murmur heard.  No friction rub. No gallop.    Pulmonary:      Effort: Pulmonary effort is normal. No respiratory distress.      Breath sounds: Normal breath sounds. No stridor. No wheezing, rhonchi or rales.   Abdominal:      General: Abdomen is flat. Bowel sounds are normal. There is no distension.      Palpations: Abdomen is soft. There is no mass.      Tenderness: There is no abdominal tenderness. There is no right CVA tenderness, left CVA tenderness, guarding or rebound.      Hernia: No hernia is present.   Musculoskeletal:          General: No swelling, tenderness, deformity or signs of injury. Normal range of motion.      Cervical back: Normal range of motion. No rigidity.      Right lower leg: Edema present.      Left lower leg: Edema present.   Lymphadenopathy:      Cervical: No cervical adenopathy.   Skin:     General: Skin is warm.      Coloration: Skin is not jaundiced.      Findings: No bruising, lesion or rash.   Neurological:      General: No focal deficit present.      Mental Status: She is alert and oriented to person, place, and time.      Cranial Nerves: No cranial nerve deficit.      Motor: No weakness.      Gait: Gait normal.   Psychiatric:         Mood and Affect: Mood normal.         Behavior: Behavior normal.         Thought Content: Thought content normal.         Judgment: Judgment normal.     BRAYAN Becerra MD performed the physical exam on 1/24/2022 as documented above.    Lab Results - Last 18 Months   Lab Units 01/17/22  1441 11/23/21  1401 11/14/21  0315   WBC 10*3/mm3 6.07 6.88 7.00   HEMOGLOBIN g/dL 10.5* 9.8* 8.6*   HEMATOCRIT % 34.3 33.3* 27.4*   PLATELETS 10*3/mm3 344 231 336   MCV fL 84.7 74.8* 69.4*     Lab Results - Last 18 Months   Lab Units 11/14/21  0315 11/13/21  0537 11/12/21  0519 11/11/21  1103 11/11/21  0116 11/10/21  1511 11/09/21  1534   SODIUM mmol/L 139 143 143  --    < >   < > 140   POTASSIUM mmol/L 4.6 4.8 4.3  --    < >   < > CANCELED   CHLORIDE mmol/L 108* 110* 109*  --    < >   < > 103   TOTAL CO2 mmol/L  --   --   --   --   --   --  20   CO2 mmol/L 19.0* 23.0 22.0  --    < >   < >  --    BUN mg/dL 18 22 23  --    < >   < > 17   CREATININE mg/dL 0.96 1.40* 1.06*  --    < >   < > 1.34*   CALCIUM mg/dL 7.5* 7.6* 7.6*  --    < >   < > 8.5*   BILIRUBIN mg/dL  --   --  0.3 0.5  --   --  0.2   ALK PHOS U/L  --   --  77  --   --   --  82   ALT (SGPT) U/L  --   --  6  --   --   --  9   AST (SGOT) U/L  --   --  13  --   --   --  20   GLUCOSE mg/dL 136* 94 90  --    < >   < > CANCELED    < > = values  in this interval not displayed.       Lab Results   Component Value Date    GLUCOSE 136 (H) 11/14/2021    BUN 18 11/14/2021    CREATININE 0.96 11/14/2021    EGFRIFNONA 58 (L) 11/14/2021    EGFRIFAFRI 48 (L) 11/09/2021    BCR 18.8 11/14/2021    K 4.6 11/14/2021    CO2 19.0 (L) 11/14/2021    CALCIUM 7.5 (L) 11/14/2021    PROTENTOTREF 5.7 (L) 11/11/2021    ALBUMIN 3.00 (L) 11/12/2021    LABIL2 1.0 11/11/2021    AST 13 11/12/2021    ALT 6 11/12/2021     Lab Results - Last 18 Months   Lab Units 11/13/21  0537 11/10/21  2123 11/10/21  1511   INR  0.96  --  0.94   APTT seconds  --  24.1  --      Lab Results   Component Value Date    IRON 21 (L) 01/17/2022    TIBC 410 01/17/2022    FERRITIN 16.96 01/17/2022     Lab Results   Component Value Date    FOLATE 11.80 11/11/2021     Lab Results   Component Value Date    RETICCTPCT 1.38 11/11/2021     Lab Results   Component Value Date    CGHQFAWY45 320 11/11/2021     No results found for: SPEP, UPEP  LDH   Date Value Ref Range Status   11/11/2021 215 (H) 135 - 214 U/L Final     Lab Results   Component Value Date    HAPTOGLOBIN 205 (H) 11/11/2021     Lab Results   Component Value Date    PTT 24.1 11/10/2021    INR 0.96 11/13/2021     Assessment/Plan     1.  Persists with microcytic anemia, though there has been improvement both in the hemoglobin and the mean corpuscular volume.  However, this is likely to return to the preadmission levels if additional intervention is not undertaken.  I discussed this at length with her.  Explained that in her situation diet sources of iron are not enough to restore iron sufficiency.  On this basis she accepted to be treated with oral iron.  I sent a prescription for ferrous sulfate 325 mg to be taken daily to her pharmacy.  2.  She is to see me again in approximately 6 weeks to review the results of the above treatment.    Alex Becerra MD on January 24, 2022 at 11:06 AM

## 2022-01-24 ENCOUNTER — APPOINTMENT (OUTPATIENT)
Dept: LAB | Facility: HOSPITAL | Age: 67
End: 2022-01-24

## 2022-01-24 ENCOUNTER — OFFICE VISIT (OUTPATIENT)
Dept: ONCOLOGY | Facility: CLINIC | Age: 67
End: 2022-01-24

## 2022-01-24 VITALS
HEART RATE: 67 BPM | BODY MASS INDEX: 28.47 KG/M2 | DIASTOLIC BLOOD PRESSURE: 92 MMHG | OXYGEN SATURATION: 99 % | WEIGHT: 145 LBS | TEMPERATURE: 97.1 F | SYSTOLIC BLOOD PRESSURE: 154 MMHG | RESPIRATION RATE: 18 BRPM | HEIGHT: 60 IN

## 2022-01-24 DIAGNOSIS — D50.0 IRON DEFICIENCY ANEMIA DUE TO CHRONIC BLOOD LOSS: Primary | ICD-10-CM

## 2022-01-24 PROCEDURE — 99213 OFFICE O/P EST LOW 20 MIN: CPT | Performed by: INTERNAL MEDICINE

## 2022-01-24 RX ORDER — FERROUS SULFATE 325(65) MG
325 TABLET ORAL
Qty: 30 TABLET | Refills: 11 | Status: SHIPPED | OUTPATIENT
Start: 2022-01-24 | End: 2022-03-04

## 2022-01-24 RX ORDER — FERROUS SULFATE 325(65) MG
325 TABLET ORAL DAILY
Qty: 30 TABLET | Refills: 11 | Status: SHIPPED | OUTPATIENT
Start: 2022-01-24

## 2022-02-04 ENCOUNTER — OFFICE VISIT (OUTPATIENT)
Dept: BARIATRICS/WEIGHT MGMT | Facility: CLINIC | Age: 67
End: 2022-02-04

## 2022-02-04 VITALS
HEART RATE: 144 BPM | SYSTOLIC BLOOD PRESSURE: 160 MMHG | HEIGHT: 59 IN | DIASTOLIC BLOOD PRESSURE: 95 MMHG | WEIGHT: 163 LBS | OXYGEN SATURATION: 97 % | TEMPERATURE: 98.1 F | BODY MASS INDEX: 32.86 KG/M2

## 2022-02-04 DIAGNOSIS — Z46.51 FITTING AND ADJUSTMENT OF GASTRIC LAP BAND: Primary | ICD-10-CM

## 2022-02-04 PROCEDURE — 99213 OFFICE O/P EST LOW 20 MIN: CPT | Performed by: SURGERY

## 2022-02-04 NOTE — PROGRESS NOTES
[facility] BAR SURG [facility] BARIATRIC SURGERY  [address] [place] [address]  [address] [place] [address]  Dept: [phone]  2/4/2022      Duyen Odom.  [id number]  [id number]  1955  female      Chief Complaint   Patient presents with   • Consult     lap band adjustment        BH Post-Op Bariatric Surgery:   Duyen Odom is status post procedure listed above    HPI: Is a 66-year-old lady who has a history of lap band placement in the year 2000.  Last November she was hospitalized and found to be anemic with a hemoglobin at 6.2.  Upper endoscopy demonstrated erosive esophagitis.  She also had a CT scan demonstrated a severely dilated esophagus.  At that time I was consulted and I removed 4 cc from her band.  She has returned today due to weight gain and would like to have a small fluid put back in the band.  Her anemia has resolved.    Wt Readings from Last 10 Encounters:   02/04/22 73.9 kg (163 lb)   01/24/22 65.8 kg (145 lb)   12/01/21 65.8 kg (145 lb)   11/23/21 66 kg (145 lb 9.6 oz)   11/16/21 62.1 kg (136 lb 12.8 oz)   11/14/21 64.5 kg (142 lb 3.2 oz)   11/09/21 59 kg (130 lb)        Review of Systems   Constitutional: Negative.    HENT: Negative.    Eyes: Negative.    Respiratory: Negative.    Cardiovascular: Negative.    Gastrointestinal: Negative.    Endocrine: Negative.    Genitourinary: Negative.    Musculoskeletal: Negative.    Skin: Negative.    Allergic/Immunologic: Negative.    Neurological: Negative.    Hematological: Negative.    Psychiatric/Behavioral: Negative.        Patient Active Problem List   Diagnosis   • Dyspnea   • Pain of right hip joint   • Injury of head   • Accidental fall   • Arthralgia of right elbow   • Elevated troponin   • Elevated d-dimer   • Symptomatic anemia   • History of COVID-19   • Chest tightness   • Non-STEMI (non-ST elevated myocardial infarction) (HCC)       Past Medical History:   Diagnosis Date    • Anemia    • Chest tightness    • Coronary artery disease     minimal   • Elevated troponin    • GERD (gastroesophageal reflux disease)    • High platelet count    • Hyperlipidemia    • Hypertension    • Peripheral edema    • PONV (postoperative nausea and vomiting)    • Positive D-dimer    • Pulmonary embolus (HCC)     history       The following portions of the patient's history were reviewed and updated as appropriate: allergies, current medications, past family history, past medical history, past social history, past surgical history and problem list.    Vitals:    02/04/22 1354   BP: 160/95   Pulse: (!) 144   Temp: 98.1 °F (36.7 °C)   SpO2: 97%       Physical Exam  Vitals reviewed.   Constitutional:       Appearance: She is well-developed.   HENT:      Head: Normocephalic and atraumatic.   Eyes:      Conjunctiva/sclera: Conjunctivae normal.      Pupils: Pupils are equal, round, and reactive to light.   Cardiovascular:      Rate and Rhythm: Normal rate and regular rhythm.      Heart sounds: Normal heart sounds.   Pulmonary:      Effort: Pulmonary effort is normal.      Breath sounds: Normal breath sounds.   Abdominal:      General: Bowel sounds are normal. There is no distension.      Palpations: Abdomen is soft. There is no mass.      Tenderness: There is no abdominal tenderness. There is no guarding or rebound.      Hernia: No hernia is present.   Musculoskeletal:         General: Normal range of motion.      Cervical back: Normal range of motion and neck supple.   Skin:     General: Skin is warm and dry.   Neurological:      Mental Status: She is alert and oriented to person, place, and time.           Assessment:   Patient has a history of lap band removed all the fluid from her band a few months ago due to anemia esophagitis and a dilated esophagus.  She gained about 20 pounds and would like some more fluid put back in her band.  Today under sterile technique I did add 2 cc to the band port.  Plan:   I  told her as long as she loses about 1 or 2 pounds a week that is fine but if she does reach a plateau for a few weeks she can come back and we consider placing perhaps 0.5 cc back into the but we must be careful because we do not want her to get into the trouble she was in last November.

## 2022-02-28 ENCOUNTER — LAB (OUTPATIENT)
Dept: LAB | Facility: HOSPITAL | Age: 67
End: 2022-02-28

## 2022-02-28 DIAGNOSIS — D50.0 IRON DEFICIENCY ANEMIA DUE TO CHRONIC BLOOD LOSS: ICD-10-CM

## 2022-02-28 LAB
ALBUMIN SERPL-MCNC: 3.4 G/DL (ref 3.5–5.2)
ALBUMIN/GLOB SERPL: 1.3 G/DL
ALP SERPL-CCNC: 78 U/L (ref 39–117)
ALT SERPL W P-5'-P-CCNC: 7 U/L (ref 1–33)
ANION GAP SERPL CALCULATED.3IONS-SCNC: 10 MMOL/L (ref 5–15)
AST SERPL-CCNC: 16 U/L (ref 1–32)
BASOPHILS # BLD AUTO: 0.06 10*3/MM3 (ref 0–0.2)
BASOPHILS NFR BLD AUTO: 0.9 % (ref 0–1.5)
BILIRUB SERPL-MCNC: 0.2 MG/DL (ref 0–1.2)
BUN SERPL-MCNC: 16 MG/DL (ref 8–23)
BUN/CREAT SERPL: 15.4 (ref 7–25)
CALCIUM SPEC-SCNC: 8.7 MG/DL (ref 8.6–10.5)
CHLORIDE SERPL-SCNC: 104 MMOL/L (ref 98–107)
CO2 SERPL-SCNC: 25 MMOL/L (ref 22–29)
CREAT SERPL-MCNC: 1.04 MG/DL (ref 0.57–1)
DEPRECATED RDW RBC AUTO: 54.9 FL (ref 37–54)
EGFRCR SERPLBLD CKD-EPI 2021: 59.8 ML/MIN/1.73
EOSINOPHIL # BLD AUTO: 0.22 10*3/MM3 (ref 0–0.4)
EOSINOPHIL NFR BLD AUTO: 3.3 % (ref 0.3–6.2)
ERYTHROCYTE [DISTWIDTH] IN BLOOD BY AUTOMATED COUNT: 17.8 % (ref 12.3–15.4)
FERRITIN SERPL-MCNC: 12.95 NG/ML (ref 13–150)
GLOBULIN UR ELPH-MCNC: 2.7 GM/DL
GLUCOSE SERPL-MCNC: 92 MG/DL (ref 65–99)
HCT VFR BLD AUTO: 35.7 % (ref 34–46.6)
HGB BLD-MCNC: 11 G/DL (ref 12–15.9)
IRON 24H UR-MRATE: 26 MCG/DL (ref 37–145)
IRON SATN MFR SERPL: 7 % (ref 20–50)
LYMPHOCYTES # BLD AUTO: 1.39 10*3/MM3 (ref 0.7–3.1)
LYMPHOCYTES NFR BLD AUTO: 20.8 % (ref 19.6–45.3)
MCH RBC QN AUTO: 26.8 PG (ref 26.6–33)
MCHC RBC AUTO-ENTMCNC: 30.8 G/DL (ref 31.5–35.7)
MCV RBC AUTO: 87.1 FL (ref 79–97)
MONOCYTES # BLD AUTO: 0.91 10*3/MM3 (ref 0.1–0.9)
MONOCYTES NFR BLD AUTO: 13.6 % (ref 5–12)
NEUTROPHILS NFR BLD AUTO: 4.11 10*3/MM3 (ref 1.7–7)
NEUTROPHILS NFR BLD AUTO: 61.4 % (ref 42.7–76)
PLATELET # BLD AUTO: 345 10*3/MM3 (ref 140–450)
PMV BLD AUTO: 8.8 FL (ref 6–12)
POTASSIUM SERPL-SCNC: 4.4 MMOL/L (ref 3.5–5.2)
PROT SERPL-MCNC: 6.1 G/DL (ref 6–8.5)
RBC # BLD AUTO: 4.1 10*6/MM3 (ref 3.77–5.28)
SODIUM SERPL-SCNC: 139 MMOL/L (ref 136–145)
TIBC SERPL-MCNC: 386 MCG/DL (ref 298–536)
TRANSFERRIN SERPL-MCNC: 259 MG/DL (ref 200–360)
WBC NRBC COR # BLD: 6.69 10*3/MM3 (ref 3.4–10.8)

## 2022-02-28 PROCEDURE — 82728 ASSAY OF FERRITIN: CPT

## 2022-02-28 PROCEDURE — 80053 COMPREHEN METABOLIC PANEL: CPT

## 2022-02-28 PROCEDURE — 84466 ASSAY OF TRANSFERRIN: CPT

## 2022-02-28 PROCEDURE — 85025 COMPLETE CBC W/AUTO DIFF WBC: CPT

## 2022-02-28 PROCEDURE — 83540 ASSAY OF IRON: CPT

## 2022-02-28 PROCEDURE — 36415 COLL VENOUS BLD VENIPUNCTURE: CPT

## 2022-03-03 ENCOUNTER — TELEPHONE (OUTPATIENT)
Dept: BARIATRICS/WEIGHT MGMT | Facility: CLINIC | Age: 67
End: 2022-03-03

## 2022-03-03 NOTE — TELEPHONE ENCOUNTER
LMVM. Need to see about moving appt time to an earlier time due to a change in Dr Hidalgo's surgery schedule. Going to send a my chart message too.

## 2022-03-04 ENCOUNTER — OFFICE VISIT (OUTPATIENT)
Dept: BARIATRICS/WEIGHT MGMT | Facility: CLINIC | Age: 67
End: 2022-03-04

## 2022-03-04 VITALS
HEIGHT: 59 IN | RESPIRATION RATE: 16 BRPM | SYSTOLIC BLOOD PRESSURE: 137 MMHG | BODY MASS INDEX: 33.3 KG/M2 | OXYGEN SATURATION: 97 % | HEART RATE: 102 BPM | WEIGHT: 165.2 LBS | DIASTOLIC BLOOD PRESSURE: 92 MMHG

## 2022-03-04 DIAGNOSIS — Z46.51 FITTING AND ADJUSTMENT OF GASTRIC LAP BAND: Primary | ICD-10-CM

## 2022-03-04 PROCEDURE — 99214 OFFICE O/P EST MOD 30 MIN: CPT | Performed by: SURGERY

## 2022-03-04 NOTE — PROGRESS NOTES
[facility] BARIATRIC SURGERY  [address] [place] [address]  [address] [place] [address]  Dept: [phone]  3/4/2022      Duyen Odom.  [id number]  [id number]  1955  female      Chief Complaint   Patient presents with   • Lap Band Adjustment     Wants fluid added       BH Post-Op Bariatric Surgery:   Duyen Odom is status post procedure listed above  HPI:     Wt Readings from Last 10 Encounters:   03/04/22 74.9 kg (165 lb 3.2 oz)   02/04/22 73.9 kg (163 lb)   01/24/22 65.8 kg (145 lb)   12/01/21 65.8 kg (145 lb)   11/23/21 66 kg (145 lb 9.6 oz)   11/16/21 62.1 kg (136 lb 12.8 oz)   11/14/21 64.5 kg (142 lb 3.2 oz)   11/09/21 59 kg (130 lb)      Still not losing weight.  I removed 4 cc from her band last November because of severe anemia and severely dilated esophagus on CT scan.  Because she had gained 20 pounds.  I added 2 cc to the band.  In talking her about her diet it sounds like she drinks a lot of Gorge-Aid throughout the day which does contain sugar.  She also does have about 1 cigarette/day.  She says she eats breakfast and dinner much food between.  Review of Systems    Patient Active Problem List   Diagnosis   • Dyspnea   • Pain of right hip joint   • Injury of head   • Accidental fall   • Arthralgia of right elbow   • Elevated troponin   • Elevated d-dimer   • Symptomatic anemia   • History of COVID-19   • Chest tightness   • Non-STEMI (non-ST elevated myocardial infarction) (HCC)       Past Medical History:   Diagnosis Date   • Anemia    • Chest tightness    • Coronary artery disease     minimal   • Elevated troponin    • GERD (gastroesophageal reflux disease)    • High platelet count    • Hyperlipidemia    • Hypertension    • Peripheral edema    • PONV (postoperative nausea and vomiting)    • Positive D-dimer    • Pulmonary embolus (HCC)     history       The following portions of the patient's history were  reviewed and updated as appropriate: allergies, current medications, past family history, past medical history, past social history, past surgical history and problem list.    Vitals:    03/04/22 1129   BP: 137/92   Pulse: 102   Resp: 16   SpO2: 97%       Physical Exam  No distress  Abdomen nontender nondistended    Assessment:   Post-op, the patient has a BMI of 33.9 and has not been able to lose weight.  She has a history of her band being too tight leading to dilated esophagus removed all the fluid from her band which was 4 cc.  She gained 20 pounds.  Last month I had to cc back to the band because of weight gain.  Today she is saying she is unable to lose any weight..     Plan:   Today under sterile technique I did add 0.5 cc back to the band so she has a total of 2.5 cc.  She previously had 4 cc in the band for which she was having a lot of problems.    I told her that she really needs to cut back on the liquid calories and try to limit that altogether.  I am concerned about putting any more fluid in the band because of the risk of causing increased problems with esophagitis and anemia.  She does want to follow-up in 1 month.

## 2022-03-07 ENCOUNTER — APPOINTMENT (OUTPATIENT)
Dept: LAB | Facility: HOSPITAL | Age: 67
End: 2022-03-07

## 2022-03-24 ENCOUNTER — TELEPHONE (OUTPATIENT)
Dept: FAMILY MEDICINE CLINIC | Facility: CLINIC | Age: 67
End: 2022-03-24

## 2022-04-04 ENCOUNTER — OFFICE VISIT (OUTPATIENT)
Dept: BARIATRICS/WEIGHT MGMT | Facility: CLINIC | Age: 67
End: 2022-04-04

## 2022-04-04 VITALS
OXYGEN SATURATION: 100 % | HEART RATE: 104 BPM | DIASTOLIC BLOOD PRESSURE: 72 MMHG | HEIGHT: 59 IN | BODY MASS INDEX: 33.38 KG/M2 | WEIGHT: 165.6 LBS | SYSTOLIC BLOOD PRESSURE: 138 MMHG | RESPIRATION RATE: 15 BRPM

## 2022-04-04 DIAGNOSIS — E66.9 OBESITY, CLASS I, BMI 30-34.9: Primary | ICD-10-CM

## 2022-04-04 PROCEDURE — 99213 OFFICE O/P EST LOW 20 MIN: CPT | Performed by: SURGERY

## 2022-04-04 RX ORDER — TRAZODONE HYDROCHLORIDE 50 MG/1
50 TABLET ORAL NIGHTLY
Qty: 30 TABLET | Refills: 3 | Status: SHIPPED | OUTPATIENT
Start: 2022-04-04

## 2022-04-04 NOTE — PROGRESS NOTES
[facility] BAR SURG [facility] BARIATRIC SURGERY  [address] [place] [address]  [address] [place] [address]  Dept: [phone]  4/4/2022      Duyen Odom.  [id number]  [id number]  1955  female      Chief Complaint   Patient presents with   • Lap Band Adjustment     Band adjustment - currently holding 2.5cc       BH Post-Op Bariatric Surgery:   Duyen Odom is status post procedure listed above  HPI:     Wt Readings from Last 10 Encounters:   04/04/22 75.1 kg (165 lb 9.6 oz)   03/04/22 74.9 kg (165 lb 3.2 oz)   02/04/22 73.9 kg (163 lb)   01/24/22 65.8 kg (145 lb)   12/01/21 65.8 kg (145 lb)   11/23/21 66 kg (145 lb 9.6 oz)   11/16/21 62.1 kg (136 lb 12.8 oz)   11/14/21 64.5 kg (142 lb 3.2 oz)   11/09/21 59 kg (130 lb)          She says she eats 3 meals a day and also grazes. She makes some poor choices.  She says that she does not have the type of restriction that she had before.  She used to have 4 cc in her band and [facility] from her machine presented with anemia and CT scan demonstrated extreme dilation of the esophagus.  At that time I removed all the fluid from her band.  She had esophagitis on upper endoscopy.        I spoke to her about her diet and I suggest that she try NOOM or some other type of calorie counting software.  She is actually pretty active.    I added 0.5 cc to add 3 cc.         Supplements: none.     Review of Systems   Constitutional: Negative.    HENT: Negative.    Eyes: Negative.    Respiratory: Negative.    Cardiovascular: Negative.    Gastrointestinal: Negative.    Endocrine: Negative.    Genitourinary: Negative.    Musculoskeletal: Negative.    Skin: Negative.    Allergic/Immunologic: Negative.    Neurological: Negative.    Hematological: Negative.    Psychiatric/Behavioral: Negative.        Patient Active Problem List   Diagnosis   • Dyspnea   • Pain of right hip joint   • Injury of head   • Accidental fall   •  Arthralgia of right elbow   • Elevated troponin   • Elevated d-dimer   • Symptomatic anemia   • History of COVID-19   • Chest tightness   • Non-STEMI (non-ST elevated myocardial infarction) (HCC)       Past Medical History:   Diagnosis Date   • Anemia    • Chest tightness    • Coronary artery disease     minimal   • Elevated troponin    • GERD (gastroesophageal reflux disease)    • High platelet count    • Hyperlipidemia    • Hypertension    • Peripheral edema    • PONV (postoperative nausea and vomiting)    • Positive D-dimer    • Pulmonary embolus (HCC)     history       The following portions of the patient's history were reviewed and updated as appropriate: allergies, current medications, past family history, past medical history, past social history, past surgical history and problem list.    Vitals:    04/04/22 1004   BP: 138/72   Pulse: 104   Resp: 15   SpO2: 100%       Physical Exam  Vitals reviewed.   Constitutional:       Appearance: She is well-developed.   HENT:      Head: Normocephalic and atraumatic.   Eyes:      Conjunctiva/sclera: Conjunctivae normal.      Pupils: Pupils are equal, round, and reactive to light.   Cardiovascular:      Rate and Rhythm: Normal rate and regular rhythm.      Heart sounds: Normal heart sounds.   Pulmonary:      Effort: Pulmonary effort is normal.      Breath sounds: Normal breath sounds.   Abdominal:      General: Bowel sounds are normal. There is no distension.      Palpations: Abdomen is soft. There is no mass.      Tenderness: There is no abdominal tenderness. There is no guarding or rebound.      Hernia: No hernia is present.   Musculoskeletal:         General: Normal range of motion.      Cervical back: Normal range of motion and neck supple.   Skin:     General: Skin is warm and dry.   Neurological:      Mental Status: She is alert and oriented to person, place, and time.            Assessment:   Post-op, the patient has a history of her LAP-BAND being too tight  causing esophageal dilation bleeding.  I removed all the fluid from her band a few months ago but she does come back with weight gain and would like it tightened again.  She now has a total of 3 cc in her band after added 0.5 cc today..     Plan:     Encouraged patient to be sure to get plenty of lean protein per day through small frequent meals all with a protein source.   Activity restrictions: none.   Recommended patient be sure to get at least 70 grams of protein per day by eating small, frequent meals all with high lean protein choices. Be sure to limit/cut back on daily carbohydrate intake. Discussed with the patient the recommended amount of water per day to intake- half of body weight in ounces. Reviewed vitamin requirements. Be sure to do routine exercise, 150 minutes per week minimum, including both cardio and strength training.     Instructions / Recommendations: dietary counseling recommended, recommended a daily protein intake of  grams, vitamin supplement(s) recommended, recommended exercising at least 150 minutes per week, behavior modifications recommended and instructed to call the office for concerns, questions, or problems.     The patient was instructed to follow up in 1 month .     The patient was counseled regarding. Total time spent face to face was 15 minutes and 15 minutes was spent counseling.

## 2022-04-21 ENCOUNTER — APPOINTMENT (OUTPATIENT)
Dept: GENERAL RADIOLOGY | Facility: HOSPITAL | Age: 67
End: 2022-04-21

## 2022-04-21 ENCOUNTER — HOSPITAL ENCOUNTER (EMERGENCY)
Facility: HOSPITAL | Age: 67
Discharge: HOME | End: 2022-04-21
Attending: EMERGENCY MEDICINE | Admitting: EMERGENCY MEDICINE

## 2022-04-21 VITALS
DIASTOLIC BLOOD PRESSURE: 80 MMHG | HEIGHT: 60 IN | HEART RATE: 58 BPM | BODY MASS INDEX: 31.51 KG/M2 | TEMPERATURE: 98.2 F | SYSTOLIC BLOOD PRESSURE: 150 MMHG | RESPIRATION RATE: 12 BRPM | OXYGEN SATURATION: 98 % | WEIGHT: 160.5 LBS

## 2022-04-21 DIAGNOSIS — J40 BRONCHITIS: Primary | ICD-10-CM

## 2022-04-21 DIAGNOSIS — Z86.2 HISTORY OF ANEMIA: ICD-10-CM

## 2022-04-21 DIAGNOSIS — T73.3XXA FATIGUE DUE TO EXCESSIVE EXERTION, INITIAL ENCOUNTER: ICD-10-CM

## 2022-04-21 LAB
ABO GROUP BLD: NORMAL
ALBUMIN SERPL-MCNC: 3.4 G/DL (ref 3.5–5.2)
ALBUMIN/GLOB SERPL: 1.1 G/DL
ALP SERPL-CCNC: 62 U/L (ref 39–117)
ALT SERPL W P-5'-P-CCNC: 10 U/L (ref 1–33)
ANION GAP SERPL CALCULATED.3IONS-SCNC: 11 MMOL/L (ref 5–15)
AST SERPL-CCNC: 19 U/L (ref 1–32)
BASOPHILS # BLD AUTO: 0 10*3/MM3 (ref 0–0.2)
BASOPHILS NFR BLD AUTO: 0.9 % (ref 0–1.5)
BILIRUB SERPL-MCNC: 0.2 MG/DL (ref 0–1.2)
BLD GP AB SCN SERPL QL: NEGATIVE
BUN SERPL-MCNC: 11 MG/DL (ref 8–23)
BUN/CREAT SERPL: 10.9 (ref 7–25)
CALCIUM SPEC-SCNC: 8.4 MG/DL (ref 8.6–10.5)
CHLORIDE SERPL-SCNC: 106 MMOL/L (ref 98–107)
CO2 SERPL-SCNC: 23 MMOL/L (ref 22–29)
CREAT SERPL-MCNC: 1.01 MG/DL (ref 0.57–1)
DEPRECATED RDW RBC AUTO: 52.1 FL (ref 37–54)
EGFRCR SERPLBLD CKD-EPI 2021: 61.9 ML/MIN/1.73
EOSINOPHIL # BLD AUTO: 0.1 10*3/MM3 (ref 0–0.4)
EOSINOPHIL NFR BLD AUTO: 2.2 % (ref 0.3–6.2)
ERYTHROCYTE [DISTWIDTH] IN BLOOD BY AUTOMATED COUNT: 18 % (ref 12.3–15.4)
ERYTHROCYTE [SEDIMENTATION RATE] IN BLOOD: 24 MM/HR (ref 0–30)
GLOBULIN UR ELPH-MCNC: 3 GM/DL
GLUCOSE SERPL-MCNC: 93 MG/DL (ref 65–99)
HCT VFR BLD AUTO: 40.5 % (ref 34–46.6)
HGB BLD-MCNC: 13.4 G/DL (ref 12–15.9)
LYMPHOCYTES # BLD AUTO: 0.7 10*3/MM3 (ref 0.7–3.1)
LYMPHOCYTES NFR BLD AUTO: 22.5 % (ref 19.6–45.3)
MAGNESIUM SERPL-MCNC: 1.7 MG/DL (ref 1.6–2.4)
MCH RBC QN AUTO: 27.4 PG (ref 26.6–33)
MCHC RBC AUTO-ENTMCNC: 33 G/DL (ref 31.5–35.7)
MCV RBC AUTO: 82.8 FL (ref 79–97)
MONOCYTES # BLD AUTO: 0.5 10*3/MM3 (ref 0.1–0.9)
MONOCYTES NFR BLD AUTO: 16.9 % (ref 5–12)
NEUTROPHILS NFR BLD AUTO: 1.7 10*3/MM3 (ref 1.7–7)
NEUTROPHILS NFR BLD AUTO: 57.5 % (ref 42.7–76)
NRBC BLD AUTO-RTO: 0.2 /100 WBC (ref 0–0.2)
NT-PROBNP SERPL-MCNC: 613.8 PG/ML (ref 0–900)
PLATELET # BLD AUTO: 246 10*3/MM3 (ref 140–450)
PMV BLD AUTO: 7.2 FL (ref 6–12)
POTASSIUM SERPL-SCNC: 3.8 MMOL/L (ref 3.5–5.2)
PROT SERPL-MCNC: 6.4 G/DL (ref 6–8.5)
RBC # BLD AUTO: 4.89 10*6/MM3 (ref 3.77–5.28)
RH BLD: POSITIVE
SODIUM SERPL-SCNC: 140 MMOL/L (ref 136–145)
T&S EXPIRATION DATE: NORMAL
TROPONIN T SERPL-MCNC: <0.01 NG/ML (ref 0–0.03)
TSH SERPL DL<=0.05 MIU/L-ACNC: 2.32 UIU/ML (ref 0.27–4.2)
WBC NRBC COR # BLD: 3 10*3/MM3 (ref 3.4–10.8)

## 2022-04-21 PROCEDURE — 83880 ASSAY OF NATRIURETIC PEPTIDE: CPT | Performed by: EMERGENCY MEDICINE

## 2022-04-21 PROCEDURE — 80053 COMPREHEN METABOLIC PANEL: CPT | Performed by: EMERGENCY MEDICINE

## 2022-04-21 PROCEDURE — 86850 RBC ANTIBODY SCREEN: CPT | Performed by: EMERGENCY MEDICINE

## 2022-04-21 PROCEDURE — 36415 COLL VENOUS BLD VENIPUNCTURE: CPT | Performed by: EMERGENCY MEDICINE

## 2022-04-21 PROCEDURE — 86901 BLOOD TYPING SEROLOGIC RH(D): CPT | Performed by: EMERGENCY MEDICINE

## 2022-04-21 PROCEDURE — 83735 ASSAY OF MAGNESIUM: CPT | Performed by: EMERGENCY MEDICINE

## 2022-04-21 PROCEDURE — 85025 COMPLETE CBC W/AUTO DIFF WBC: CPT | Performed by: EMERGENCY MEDICINE

## 2022-04-21 PROCEDURE — 85652 RBC SED RATE AUTOMATED: CPT | Performed by: EMERGENCY MEDICINE

## 2022-04-21 PROCEDURE — 99283 EMERGENCY DEPT VISIT LOW MDM: CPT

## 2022-04-21 PROCEDURE — 86900 BLOOD TYPING SEROLOGIC ABO: CPT | Performed by: EMERGENCY MEDICINE

## 2022-04-21 PROCEDURE — 71045 X-RAY EXAM CHEST 1 VIEW: CPT

## 2022-04-21 PROCEDURE — 84443 ASSAY THYROID STIM HORMONE: CPT | Performed by: EMERGENCY MEDICINE

## 2022-04-21 PROCEDURE — 84484 ASSAY OF TROPONIN QUANT: CPT | Performed by: EMERGENCY MEDICINE

## 2022-04-21 RX ORDER — GUAIFENESIN AND CODEINE PHOSPHATE 100; 10 MG/5ML; MG/5ML
5 SOLUTION ORAL 4 TIMES DAILY PRN
Qty: 60 ML | Refills: 0 | Status: SHIPPED | OUTPATIENT
Start: 2022-04-21 | End: 2022-05-20

## 2022-04-21 RX ORDER — AZITHROMYCIN 500 MG/1
500 TABLET, FILM COATED ORAL DAILY
Qty: 3 TABLET | Refills: 0 | Status: SHIPPED | OUTPATIENT
Start: 2022-04-21 | End: 2022-05-20

## 2022-04-21 NOTE — ED PROVIDER NOTES
Subjective   66-year-old female complaining of weakness and fatigue increasing over the last couple of weeks.  She says last 5 days she has had a nonproductive cough.  She reports no fever or chills.  She reports that she has had no focal weakness.  States she has not had muscle aches or pain she reports that she has felt tired.  She reports last time she felt this way she was significantly anemic, possibly but not conclusively from GI blood loss.  The patient reports that she has had no recent hemoptysis or nosebleeds.  She reports no melena hematemesis or hematochezia.  She denies dysuria or hematuria.  She reports no recent vaccinations or biologic injections          Review of Systems   Constitutional: Positive for chills, fatigue and fever.   HENT: Negative for sore throat and trouble swallowing.    Eyes: Negative for photophobia and discharge.   Respiratory: Positive for cough, chest tightness and shortness of breath.    Cardiovascular: Negative for palpitations and leg swelling.   Gastrointestinal: Negative for diarrhea, nausea and vomiting.   Endocrine: Negative for polydipsia, polyphagia and polyuria.   Genitourinary: Negative for flank pain and vaginal bleeding.   Musculoskeletal: Negative for back pain, neck pain and neck stiffness.   Skin: Negative for rash.   Neurological: Positive for dizziness, weakness and light-headedness. Negative for tremors, syncope and headaches.   Hematological: Does not bruise/bleed easily.   Psychiatric/Behavioral: The patient is not nervous/anxious.    All other systems reviewed and are negative.      Past Medical History:   Diagnosis Date   • Anemia    • Chest tightness    • Coronary artery disease     minimal   • Elevated troponin    • GERD (gastroesophageal reflux disease)    • High platelet count    • Hyperlipidemia    • Hypertension    • Peripheral edema    • PONV (postoperative nausea and vomiting)    • Positive D-dimer    • Pulmonary embolus (HCC)     history        Allergies   Allergen Reactions   • Iodinated Diagnostic Agents Hives     HIVES ALL OVER CHEST DURING IVP       Past Surgical History:   Procedure Laterality Date   • ABDOMINAL SURGERY      lap band   • CARDIAC CATHETERIZATION N/A 2021    Procedure: Left Heart Cath and coronary angiogram;  Surgeon: Vanita Chris MD;  Location: [facility] CATH INVASIVE LOCATION;  Service: Cardiovascular;  Laterality: N/A;   • CARDIAC CATHETERIZATION N/A 2021    Procedure: Right Heart Cath;  Surgeon: Vanita Chris MD;  Location: [facility] CATH INVASIVE LOCATION;  Service: Cardiovascular;  Laterality: N/A;   •  SECTION     • ENDOSCOPY N/A 2021    Procedure: ESOPHAGOGASTRODUODENOSCOPY;  Surgeon: Austin Wick MD;  Location: [facility] ENDOSCOPY;  Service: Gastroenterology;  Laterality: N/A;  Post: sever erosive esophagitis, lap band   • HYSTERECTOMY     • LAPAROSCOPIC GASTRIC BANDING         Family History   Problem Relation Age of Onset   • Mental illness Mother    • Hypertension Mother    • Diabetes Father    • Heart disease Father        Social History     Socioeconomic History   • Marital status:    Tobacco Use   • Smoking status: Never Smoker   • Smokeless tobacco: Never Used   Vaping Use   • Vaping Use: Never used   Substance and Sexual Activity   • Alcohol use: Yes     Comment: occ.   • Drug use: Never   • Sexual activity: Defer     Reports no unusual food water travel or activity      Objective   Physical Exam  Alert Bal[place] Coma Scale 15   HEENT: Pupils equal and reactive to light. Conjunctivae are not injected. normal tympanic membranes. Oropharynx and nares are normal.   Neck: Supple. Midline trachea. No JVD. No goiter.   Chest: Few rhonchi are noted bilaterally without wheezing or rales.  And equal breath sounds bilaterally regular rate and rhythm without murmur or rub.   Abdomen: Positive bowel sounds nontender nondistended. No rebound or peritoneal signs. No CVA tenderness.    Extremities no clubbing cyanosis or edema motor sensory exam is normal the full range of motion is intact   skin: Warm and dry, no rashes or petechia.   Lymphatic: No regional lymphadenopathy. No calf pain, swelling or Yobani's sign    Procedures           ED Course                                       Labs Reviewed   COMPREHENSIVE METABOLIC PANEL - Abnormal; Notable for the following components:       Result Value    Creatinine 1.01 (*)     Calcium 8.4 (*)     Albumin 3.40 (*)     All other components within normal limits    Narrative:     GFR Normal >60  Chronic Kidney Disease <60  Kidney Failure <15     CBC WITH AUTO DIFFERENTIAL - Abnormal; Notable for the following components:    WBC 3.00 (*)     RDW 18.0 (*)     Monocyte % 16.9 (*)     All other components within normal limits   BNP (IN-HOUSE) - Normal    Narrative:     Among patients with dyspnea, NT-proBNP is highly sensitive for the detection of acute congestive heart failure. In addition NT-proBNP of <300 pg/ml effectively rules out acute congestive heart failure with 99% negative predictive value.    Results may be falsely decreased if patient taking Biotin.     TROPONIN (IN-HOUSE) - Normal    Narrative:     Troponin T Reference Range:  <= 0.03 ng/mL-   Negative for AMI  >0.03 ng/mL-     Abnormal for myocardial necrosis.  Clinicians would have to utilize clinical acumen, EKG, Troponin and serial changes to determine if it is an Acute Myocardial Infarction or myocardial injury due to an underlying chronic condition.       Results may be falsely decreased if patient taking Biotin.     SEDIMENTATION RATE - Normal   MAGNESIUM - Normal   TSH - Normal   TYPE AND SCREEN   BB ARMBAND CHECK   CBC AND DIFFERENTIAL    Narrative:     The following orders were created for panel order CBC & Differential.  Procedure                               Abnormality         Status                     ---------                               -----------         ------                      CBC Auto Differential[[id number]]        Abnormal            Final result                 Please view results for these tests on the individual orders.     Medications - No data to display  XR Chest 1 View    Result Date: 4/21/2022  No acute chest finding.  Electronically Signed By-Edel Glass MD On:4/21/2022 11:11 AM This report was finalized on [id number] by  Edel Glass MD.                 MDM  Number of Diagnoses or Management Options     Amount and/or Complexity of Data Reviewed  Clinical lab tests: ordered and reviewed  Tests in the radiology section of CPT®: ordered and reviewed  Discuss the patient with other providers: yes    Risk of Complications, Morbidity, and/or Mortality  Presenting problems: high  Diagnostic procedures: high  Management options: high  General comments: Patient reports she was sent in by her primary care provider.  We contacted that office and they had no record that they had sent the patient in.  They were advised of the test results today.  The patient replaced on Cheratussin and Zithromax.  She is encouraged to follow-up closely primary care provider for additional evaluation the patient was stable at discharge vocalized understanding of discharge instructions and warnings.        Final diagnoses:   Bronchitis   Fatigue due to excessive exertion, initial encounter   History of anemia       ED Disposition  ED Disposition     ED Disposition   Discharge    Condition   Stable    Comment   --             Ellen Red, APRN  [address] [id number]  [phone]               Medication List      Changed    aspirin 81 MG EC tablet  Take 1 tablet by mouth Daily.  What changed: additional instructions             Darron Benson MD  04/21/22 6778

## 2022-04-21 NOTE — ED NOTES
Pt came in with complaints of feeling fatigued for the past week. Pt has a cough. No report of a fever. Pt was here by in October 2021 with anemia and states she feels the same as she did with that admission.

## 2022-04-21 NOTE — DISCHARGE INSTRUCTIONS
Follow-up with primary care provider for repeat CBC  Rest, avoid exertion, next 2 days  Medication as directed  Return for worsening symptoms or follow-up with primary care provider

## 2022-05-20 ENCOUNTER — OFFICE VISIT (OUTPATIENT)
Dept: BARIATRICS/WEIGHT MGMT | Facility: CLINIC | Age: 67
End: 2022-05-20

## 2022-05-20 VITALS
BODY MASS INDEX: 32.04 KG/M2 | WEIGHT: 163.2 LBS | HEART RATE: 107 BPM | RESPIRATION RATE: 15 BRPM | OXYGEN SATURATION: 96 % | SYSTOLIC BLOOD PRESSURE: 157 MMHG | HEIGHT: 60 IN | DIASTOLIC BLOOD PRESSURE: 110 MMHG

## 2022-05-20 DIAGNOSIS — Z46.51 FITTING AND ADJUSTMENT OF GASTRIC LAP BAND: Primary | ICD-10-CM

## 2022-05-20 PROCEDURE — 99213 OFFICE O/P EST LOW 20 MIN: CPT | Performed by: SURGERY

## 2022-05-20 NOTE — PROGRESS NOTES
[facility] BAR SURG [facility] BARIATRIC SURGERY  [address] [place] [address]  [address] [place] [address]  Dept: [phone]  5/20/2022      Duyen Odom.  [id number]  [id number]  1955  female      Chief Complaint   Patient presents with   • Lap Band Adjustment     Wants more fluid       BH Post-Op Bariatric Surgery:   Duyen Odom is status post procedure listed above  HPI: She has a history of Lap-Band placement.  Last November I removed all the fluid from her band because she had a lot of esophageal dilation and anemia.  The only work-up from her anemia was some esophagitis so perhaps she was bleeding from this area but that is unclear.  About a month or 2 after that she gained 20 pounds and would really like the band to be back where it was before.  Last time we added 0.5 cc and got it up to 3 cc.  It had been up to 4 cc last year.    She continues to have a grazing type behavior where she eats small amounts of food frequently.  She continues to really want the band to be back up to where it was before but I have been hesitant to do that in the past for fears of more esophageal problems.  She claims that she never had any bad symptoms preceding this.    Therefore today under sterile technique I did add 1 more cc to her port to get her up to 4 cc.  I told her that we should not put any more fluid in there.    Wt Readings from Last 10 Encounters:   05/20/22 74 kg (163 lb 3.2 oz)   04/21/22 72.8 kg (160 lb 7.9 oz)   04/04/22 75.1 kg (165 lb 9.6 oz)   03/04/22 74.9 kg (165 lb 3.2 oz)   02/04/22 73.9 kg (163 lb)   01/24/22 65.8 kg (145 lb)   12/01/21 65.8 kg (145 lb)   11/23/21 66 kg (145 lb 9.6 oz)   11/16/21 62.1 kg (136 lb 12.8 oz)   11/14/21 64.5 kg (142 lb 3.2 oz)        Liquids consumed- coffee, water,   Snacks during the day- hamburger eat 1/2 eat more      Review of Systems   Constitutional: Negative.    HENT: Negative.    Eyes: Negative.     Respiratory: Negative.    Cardiovascular: Negative.    Gastrointestinal: Negative.    Endocrine: Negative.    Genitourinary: Negative.    Musculoskeletal: Negative.    Skin: Negative.    Allergic/Immunologic: Negative.    Neurological: Negative.    Hematological: Negative.    Psychiatric/Behavioral: Negative.        Patient Active Problem List   Diagnosis   • Dyspnea   • Pain of right hip joint   • Injury of head   • Accidental fall   • Arthralgia of right elbow   • Elevated troponin   • Elevated d-dimer   • Symptomatic anemia   • History of COVID-19   • Chest tightness   • Non-STEMI (non-ST elevated myocardial infarction) (HCC)       Past Medical History:   Diagnosis Date   • Anemia    • Chest tightness    • Coronary artery disease     minimal   • Elevated troponin    • GERD (gastroesophageal reflux disease)    • High platelet count    • Hyperlipidemia    • Hypertension    • Peripheral edema    • PONV (postoperative nausea and vomiting)    • Positive D-dimer    • Pulmonary embolus (HCC)     history       The following portions of the patient's history were reviewed and updated as appropriate: allergies, current medications, past family history, past medical history, past social history, past surgical history and problem list.    Vitals:    05/20/22 1304   BP: (!) 157/110   Pulse: 107   Resp: 15   SpO2: 96%       Physical Exam  Awake and alert  Normal mental status  Normal pulmonary effort  Abdomen appropriate tenderness  Incisions no erythema  Extremities no tenderness or swelling      Assessment:   Post-op, the patient still was not losing weight.  This is mostly due to her grazing behavior.  I had a long talk with her about using an anish to figure out how many calories she seems to be eating a day and then making sure she writes down all the calories that she eats.  We talked about mindful eating.  We talked about being accountable.    She now has 4 cc in her band and this the most we should put in.  She  acknowledged an understanding of being more mindful of her calories that she is taking in..     Plan:     Encouraged patient to be sure to get plenty of lean protein per day through small frequent meals all with a protein source.   Activity restrictions: none.   Recommended patient be sure to get at least 70 grams of protein per day by eating small, frequent meals all with high lean protein choices. Be sure to limit/cut back on daily carbohydrate intake. Discussed with the patient the recommended amount of water per day to intake- half of body weight in ounces. Reviewed vitamin requirements. Be sure to do routine exercise, 150 minutes per week minimum, including both cardio and strength training.     Instructions / Recommendations: dietary counseling recommended, recommended a daily protein intake of  grams, vitamin supplement(s) recommended, recommended exercising at least 150 minutes per week, behavior modifications recommended and instructed to call the office for concerns, questions, or problems.     The patient was instructed to follow up in 1 month .     The patient was counseled regarding. Total time spent face to face was 15 minutes and 15 minutes was spent counseling.

## 2022-07-12 ENCOUNTER — OFFICE VISIT (OUTPATIENT)
Dept: FAMILY MEDICINE CLINIC | Facility: CLINIC | Age: 67
End: 2022-07-12

## 2022-07-12 VITALS
OXYGEN SATURATION: 96 % | WEIGHT: 154.6 LBS | SYSTOLIC BLOOD PRESSURE: 126 MMHG | BODY MASS INDEX: 30.35 KG/M2 | HEART RATE: 80 BPM | TEMPERATURE: 97.3 F | DIASTOLIC BLOOD PRESSURE: 92 MMHG | HEIGHT: 60 IN

## 2022-07-12 DIAGNOSIS — R07.9 CHEST PAIN, UNSPECIFIED TYPE: ICD-10-CM

## 2022-07-12 DIAGNOSIS — E55.9 VITAMIN D DEFICIENCY: ICD-10-CM

## 2022-07-12 DIAGNOSIS — K21.00 GASTROESOPHAGEAL REFLUX DISEASE WITH ESOPHAGITIS WITHOUT HEMORRHAGE: ICD-10-CM

## 2022-07-12 DIAGNOSIS — D50.9 IRON DEFICIENCY ANEMIA, UNSPECIFIED IRON DEFICIENCY ANEMIA TYPE: Primary | ICD-10-CM

## 2022-07-12 DIAGNOSIS — R53.83 FATIGUE, UNSPECIFIED TYPE: ICD-10-CM

## 2022-07-12 PROCEDURE — 84443 ASSAY THYROID STIM HORMONE: CPT | Performed by: NURSE PRACTITIONER

## 2022-07-12 PROCEDURE — 82306 VITAMIN D 25 HYDROXY: CPT | Performed by: NURSE PRACTITIONER

## 2022-07-12 PROCEDURE — 82728 ASSAY OF FERRITIN: CPT | Performed by: NURSE PRACTITIONER

## 2022-07-12 PROCEDURE — 82607 VITAMIN B-12: CPT | Performed by: NURSE PRACTITIONER

## 2022-07-12 PROCEDURE — 85025 COMPLETE CBC W/AUTO DIFF WBC: CPT | Performed by: NURSE PRACTITIONER

## 2022-07-12 PROCEDURE — 85045 AUTOMATED RETICULOCYTE COUNT: CPT | Performed by: NURSE PRACTITIONER

## 2022-07-12 PROCEDURE — 93000 ELECTROCARDIOGRAM COMPLETE: CPT | Performed by: NURSE PRACTITIONER

## 2022-07-12 PROCEDURE — 36415 COLL VENOUS BLD VENIPUNCTURE: CPT | Performed by: NURSE PRACTITIONER

## 2022-07-12 PROCEDURE — 82746 ASSAY OF FOLIC ACID SERUM: CPT | Performed by: NURSE PRACTITIONER

## 2022-07-12 PROCEDURE — 80053 COMPREHEN METABOLIC PANEL: CPT | Performed by: NURSE PRACTITIONER

## 2022-07-12 PROCEDURE — 83540 ASSAY OF IRON: CPT | Performed by: NURSE PRACTITIONER

## 2022-07-12 PROCEDURE — 99214 OFFICE O/P EST MOD 30 MIN: CPT | Performed by: NURSE PRACTITIONER

## 2022-07-12 PROCEDURE — 84466 ASSAY OF TRANSFERRIN: CPT | Performed by: NURSE PRACTITIONER

## 2022-07-12 RX ORDER — PANTOPRAZOLE SODIUM 40 MG/1
40 TABLET, DELAYED RELEASE ORAL DAILY
Qty: 30 TABLET | Refills: 3 | Status: SHIPPED | OUTPATIENT
Start: 2022-07-12

## 2022-07-12 RX ORDER — PANTOPRAZOLE SODIUM 40 MG/1
40 TABLET, DELAYED RELEASE ORAL DAILY
COMMUNITY
End: 2022-07-12 | Stop reason: SDUPTHER

## 2022-07-12 NOTE — PROGRESS NOTES
Venipuncture Blood Specimen Collection  Venipuncture performed in left arm by Mary Beth Villagran MA with good hemostasis. Patient tolerated the procedure well without complications.   07/12/22   Mary Beth Villagran MA     EKG performed; Mary Beth Villagran MA

## 2022-07-12 NOTE — PROGRESS NOTES
"Subjective   Duyen Odom is a 66 y.o. female presents for   Chief Complaint   Patient presents with   • Fatigue     Patient has been feeling weakness for the past week        Health Maintenance Due   Topic Date Due   • MAMMOGRAM  Never done   • DXA SCAN  Never done   • COLORECTAL CANCER SCREENING  Never done   • Pneumococcal Vaccine 65+ (1 - PCV) Never done   • TDAP/TD VACCINES (1 - Tdap) Never done   • ZOSTER VACCINE (1 of 2) Never done   • HEPATITIS C SCREENING  Never done   • ANNUAL WELLNESS VISIT  Never done   • COVID-19 Vaccine (3 - Booster) 06/03/2022       History of Present Illness   Pt present with concerns for fatigue x 1 week.  She states her ankles were swollen for a few days but resolved with rest and elevation.  She states she has been having chest pain off and on after eating.  She has been out of protonix for several weeks and didn't have refills.  She has seen GI in the past but states it has been a while and needs to make a follow up appt.  She has appt with Dr. Hidalgo next month.  Prior labs reviewed and Hgb and Hct were both normal but iron and ferritin were low.   Vitals:    07/12/22 1340   BP: 126/92   BP Location: Left arm   Patient Position: Sitting   Cuff Size: Adult   Pulse: 80   Temp: 97.3 °F (36.3 °C)   TempSrc: Temporal   SpO2: 96%   Weight: 70.1 kg (154 lb 9.6 oz)   Height: 152.4 cm (60\")     Body mass index is 30.19 kg/m².    Current Outpatient Medications on File Prior to Visit   Medication Sig Dispense Refill   • aspirin 81 MG EC tablet Take 1 tablet by mouth Daily. (Patient taking differently: Take 81 mg by mouth Daily. dont take preop)     • ferrous sulfate 325 (65 FE) MG tablet Take 1 tablet by mouth Daily. 30 tablet 11   • metoprolol succinate XL (TOPROL-XL) 25 MG 24 hr tablet Take 1 tablet by mouth Daily. 30 tablet 0   • Multiple Vitamins-Minerals (MULTI-VITAMIN GUMMIES PO) Take 1 each by mouth Daily.     • nitroglycerin (NITROSTAT) 0.4 MG SL tablet Place 1 tablet under the " tongue Every 5 (Five) Minutes As Needed for Chest Pain (Only if SBP Greater Than 100). Take no more than 3 doses in 15 minutes. 100 tablet 0   • traZODone (DESYREL) 50 MG tablet Take 1 tablet by mouth Every Night. Start by taking only half tablet and only take a full tablet if it does not work 30 tablet 3   • [DISCONTINUED] pantoprazole (PROTONIX) 40 MG EC tablet Take 40 mg by mouth Daily.     • [DISCONTINUED] atorvastatin (LIPITOR) 40 MG tablet Take 1 tablet by mouth Every Night. 30 tablet 0   • [DISCONTINUED] pantoprazole (PROTONIX) 40 MG EC tablet Take 1 tablet by mouth 2 (Two) Times a Day. 60 tablet 0     No current facility-administered medications on file prior to visit.       The following portions of the patient's history were reviewed and updated as appropriate: allergies, current medications, past family history, past medical history, past social history, past surgical history, and problem list.    Review of Systems   Constitutional: Positive for fatigue. Negative for chills and fever.   HENT: Negative for sinus pressure and sore throat.    Eyes: Negative for blurred vision.   Respiratory: Negative for cough and shortness of breath.    Cardiovascular: Positive for chest pain.   Gastrointestinal: Positive for indigestion. Negative for abdominal pain.   Endocrine: Negative.    Genitourinary: Negative.    Musculoskeletal: Negative for arthralgias and joint swelling.   Skin: Negative for color change.   Allergic/Immunologic: Negative.    Neurological: Positive for weakness. Negative for dizziness.   Hematological: Negative.    Psychiatric/Behavioral: Negative for behavioral problems.       Objective   Physical Exam  Vitals and nursing note reviewed.   Constitutional:       Appearance: Normal appearance. She is well-developed. She is ill-appearing.   HENT:      Head: Normocephalic and atraumatic.      Right Ear: External ear normal.      Left Ear: External ear normal.      Nose: Nose normal.   Eyes:       Extraocular Movements: Extraocular movements intact.      Conjunctiva/sclera: Conjunctivae normal.      Pupils: Pupils are equal, round, and reactive to light.   Cardiovascular:      Rate and Rhythm: Normal rate. Rhythm irregular.      Pulses: Normal pulses.      Heart sounds: Normal heart sounds.   Pulmonary:      Effort: Pulmonary effort is normal.      Breath sounds: Normal breath sounds.   Abdominal:      General: Bowel sounds are normal.      Palpations: Abdomen is soft.   Genitourinary:     Vagina: Normal.   Musculoskeletal:         General: Normal range of motion.      Cervical back: Normal range of motion and neck supple.   Skin:     General: Skin is warm and dry.   Neurological:      General: No focal deficit present.      Mental Status: She is alert and oriented to person, place, and time.   Psychiatric:         Mood and Affect: Mood normal.         Behavior: Behavior normal.         Judgment: Judgment normal.       PHQ-9 Total Score:      Assessment & Plan   Diagnoses and all orders for this visit:    1. Iron deficiency anemia, unspecified iron deficiency anemia type (Primary)  -     CBC & Differential  -     Comprehensive Metabolic Panel  -     Ferritin  -     Iron Profile  -     Reticulocytes  -     Vitamin B12  -     Folate    2. Gastroesophageal reflux disease with esophagitis without hemorrhage  -     pantoprazole (PROTONIX) 40 MG EC tablet; Take 1 tablet by mouth Daily.  Dispense: 30 tablet; Refill: 3    3. Fatigue, unspecified type  -     TSH    4. Vitamin D deficiency  -     Vitamin D 25 Hydroxy    5. Chest pain, unspecified type  Comments:  EKG completed, similar to prior EKG.  likely related to GERD but also instructed to f/u with cardiology  Orders:  -     ECG 12 Lead        There are no Patient Instructions on file for this visit.

## 2022-07-12 NOTE — PROGRESS NOTES
Procedure     ECG 12 Lead    Date/Time: 7/12/2022 3:16 PM  Performed by: Ellen Red APRN  Authorized by: Ellen Red APRN   Comparison: compared with previous ECG from 11/13/2021  Similar to previous ECG  Rhythm: sinus rhythm  Rate: normal  Conduction: conduction normal  ST Segments: ST segments normal  T Waves: T waves normal  QRS axis: normal    Clinical impression: abnormal EKG

## 2022-07-13 LAB
25(OH)D3 SERPL-MCNC: 21.2 NG/ML (ref 30–100)
ALBUMIN SERPL-MCNC: 3.5 G/DL (ref 3.5–5.2)
ALBUMIN/GLOB SERPL: 1 G/DL
ALP SERPL-CCNC: 80 U/L (ref 39–117)
ALT SERPL W P-5'-P-CCNC: 9 U/L (ref 1–33)
ANION GAP SERPL CALCULATED.3IONS-SCNC: 10.7 MMOL/L (ref 5–15)
AST SERPL-CCNC: 14 U/L (ref 1–32)
BASOPHILS # BLD AUTO: 0.06 10*3/MM3 (ref 0–0.2)
BASOPHILS NFR BLD AUTO: 0.9 % (ref 0–1.5)
BILIRUB SERPL-MCNC: 0.4 MG/DL (ref 0–1.2)
BUN SERPL-MCNC: 14 MG/DL (ref 8–23)
BUN/CREAT SERPL: 14.6 (ref 7–25)
CALCIUM SPEC-SCNC: 8.6 MG/DL (ref 8.6–10.5)
CHLORIDE SERPL-SCNC: 103 MMOL/L (ref 98–107)
CO2 SERPL-SCNC: 24.3 MMOL/L (ref 22–29)
CREAT SERPL-MCNC: 0.96 MG/DL (ref 0.57–1)
DEPRECATED RDW RBC AUTO: 47.3 FL (ref 37–54)
EGFRCR SERPLBLD CKD-EPI 2021: 65.8 ML/MIN/1.73
EOSINOPHIL # BLD AUTO: 0.19 10*3/MM3 (ref 0–0.4)
EOSINOPHIL NFR BLD AUTO: 2.8 % (ref 0.3–6.2)
ERYTHROCYTE [DISTWIDTH] IN BLOOD BY AUTOMATED COUNT: 15.5 % (ref 12.3–15.4)
FERRITIN SERPL-MCNC: 29.9 NG/ML (ref 13–150)
FOLATE SERPL-MCNC: 12.4 NG/ML (ref 4.78–24.2)
GLOBULIN UR ELPH-MCNC: 3.4 GM/DL
GLUCOSE SERPL-MCNC: 85 MG/DL (ref 65–99)
HCT VFR BLD AUTO: 43.2 % (ref 34–46.6)
HGB BLD-MCNC: 14.4 G/DL (ref 12–15.9)
IMM GRANULOCYTES # BLD AUTO: 0.04 10*3/MM3 (ref 0–0.05)
IMM GRANULOCYTES NFR BLD AUTO: 0.6 % (ref 0–0.5)
IRON 24H UR-MRATE: 72 MCG/DL (ref 37–145)
IRON SATN MFR SERPL: 18 % (ref 20–50)
LYMPHOCYTES # BLD AUTO: 1.56 10*3/MM3 (ref 0.7–3.1)
LYMPHOCYTES NFR BLD AUTO: 23.3 % (ref 19.6–45.3)
MCH RBC QN AUTO: 28.5 PG (ref 26.6–33)
MCHC RBC AUTO-ENTMCNC: 33.3 G/DL (ref 31.5–35.7)
MCV RBC AUTO: 85.5 FL (ref 79–97)
MONOCYTES # BLD AUTO: 0.82 10*3/MM3 (ref 0.1–0.9)
MONOCYTES NFR BLD AUTO: 12.3 % (ref 5–12)
NEUTROPHILS NFR BLD AUTO: 4.02 10*3/MM3 (ref 1.7–7)
NEUTROPHILS NFR BLD AUTO: 60.1 % (ref 42.7–76)
NRBC BLD AUTO-RTO: 0 /100 WBC (ref 0–0.2)
PLATELET # BLD AUTO: 374 10*3/MM3 (ref 140–450)
PMV BLD AUTO: 10.3 FL (ref 6–12)
POTASSIUM SERPL-SCNC: 4.1 MMOL/L (ref 3.5–5.2)
PROT SERPL-MCNC: 6.9 G/DL (ref 6–8.5)
RBC # BLD AUTO: 5.05 10*6/MM3 (ref 3.77–5.28)
RETICS # AUTO: 0.05 10*6/MM3 (ref 0.02–0.13)
RETICS/RBC NFR AUTO: 1.06 % (ref 0.7–1.9)
SODIUM SERPL-SCNC: 138 MMOL/L (ref 136–145)
TIBC SERPL-MCNC: 390 MCG/DL (ref 298–536)
TRANSFERRIN SERPL-MCNC: 262 MG/DL (ref 200–360)
TSH SERPL DL<=0.05 MIU/L-ACNC: 1.87 UIU/ML (ref 0.27–4.2)
VIT B12 BLD-MCNC: 346 PG/ML (ref 211–946)
WBC NRBC COR # BLD: 6.69 10*3/MM3 (ref 3.4–10.8)

## 2022-11-17 ENCOUNTER — OFFICE VISIT (OUTPATIENT)
Dept: FAMILY MEDICINE CLINIC | Facility: CLINIC | Age: 67
End: 2022-11-17

## 2022-11-17 VITALS
SYSTOLIC BLOOD PRESSURE: 160 MMHG | HEART RATE: 76 BPM | OXYGEN SATURATION: 96 % | DIASTOLIC BLOOD PRESSURE: 84 MMHG | BODY MASS INDEX: 31.14 KG/M2 | TEMPERATURE: 98.9 F | WEIGHT: 158.6 LBS | HEIGHT: 60 IN

## 2022-11-17 DIAGNOSIS — D50.9 IRON DEFICIENCY ANEMIA, UNSPECIFIED IRON DEFICIENCY ANEMIA TYPE: ICD-10-CM

## 2022-11-17 DIAGNOSIS — I10 PRIMARY HYPERTENSION: ICD-10-CM

## 2022-11-17 DIAGNOSIS — J40 BRONCHITIS: Primary | ICD-10-CM

## 2022-11-17 LAB
BASOPHILS # BLD AUTO: 0.05 10*3/MM3 (ref 0–0.2)
BASOPHILS NFR BLD AUTO: 0.8 % (ref 0–1.5)
DEPRECATED RDW RBC AUTO: 44.8 FL (ref 37–54)
EOSINOPHIL # BLD AUTO: 0.12 10*3/MM3 (ref 0–0.4)
EOSINOPHIL NFR BLD AUTO: 1.9 % (ref 0.3–6.2)
ERYTHROCYTE [DISTWIDTH] IN BLOOD BY AUTOMATED COUNT: 13.6 % (ref 12.3–15.4)
FERRITIN SERPL-MCNC: 36.2 NG/ML (ref 13–150)
FOLATE SERPL-MCNC: 4.24 NG/ML (ref 4.78–24.2)
HCT VFR BLD AUTO: 42.3 % (ref 34–46.6)
HGB BLD-MCNC: 14.1 G/DL (ref 12–15.9)
IMM GRANULOCYTES # BLD AUTO: 0.05 10*3/MM3 (ref 0–0.05)
IMM GRANULOCYTES NFR BLD AUTO: 0.8 % (ref 0–0.5)
IRON 24H UR-MRATE: 50 MCG/DL (ref 37–145)
IRON SATN MFR SERPL: 13 % (ref 20–50)
LYMPHOCYTES # BLD AUTO: 1.35 10*3/MM3 (ref 0.7–3.1)
LYMPHOCYTES NFR BLD AUTO: 21.4 % (ref 19.6–45.3)
MCH RBC QN AUTO: 30.2 PG (ref 26.6–33)
MCHC RBC AUTO-ENTMCNC: 33.3 G/DL (ref 31.5–35.7)
MCV RBC AUTO: 90.6 FL (ref 79–97)
MONOCYTES # BLD AUTO: 0.67 10*3/MM3 (ref 0.1–0.9)
MONOCYTES NFR BLD AUTO: 10.6 % (ref 5–12)
NEUTROPHILS NFR BLD AUTO: 4.07 10*3/MM3 (ref 1.7–7)
NEUTROPHILS NFR BLD AUTO: 64.5 % (ref 42.7–76)
NRBC BLD AUTO-RTO: 0 /100 WBC (ref 0–0.2)
PLATELET # BLD AUTO: 344 10*3/MM3 (ref 140–450)
PMV BLD AUTO: 10.1 FL (ref 6–12)
RBC # BLD AUTO: 4.67 10*6/MM3 (ref 3.77–5.28)
RETICS # AUTO: 0.05 10*6/MM3 (ref 0.02–0.13)
RETICS/RBC NFR AUTO: 1.07 % (ref 0.7–1.9)
TIBC SERPL-MCNC: 395 MCG/DL (ref 298–536)
TRANSFERRIN SERPL-MCNC: 265 MG/DL (ref 200–360)
VIT B12 BLD-MCNC: 308 PG/ML (ref 211–946)
WBC NRBC COR # BLD: 6.31 10*3/MM3 (ref 3.4–10.8)

## 2022-11-17 PROCEDURE — 85025 COMPLETE CBC W/AUTO DIFF WBC: CPT | Performed by: NURSE PRACTITIONER

## 2022-11-17 PROCEDURE — 84466 ASSAY OF TRANSFERRIN: CPT | Performed by: NURSE PRACTITIONER

## 2022-11-17 PROCEDURE — 82728 ASSAY OF FERRITIN: CPT | Performed by: NURSE PRACTITIONER

## 2022-11-17 PROCEDURE — 82746 ASSAY OF FOLIC ACID SERUM: CPT | Performed by: NURSE PRACTITIONER

## 2022-11-17 PROCEDURE — 99214 OFFICE O/P EST MOD 30 MIN: CPT | Performed by: NURSE PRACTITIONER

## 2022-11-17 PROCEDURE — 83540 ASSAY OF IRON: CPT | Performed by: NURSE PRACTITIONER

## 2022-11-17 PROCEDURE — 36415 COLL VENOUS BLD VENIPUNCTURE: CPT | Performed by: NURSE PRACTITIONER

## 2022-11-17 PROCEDURE — 85045 AUTOMATED RETICULOCYTE COUNT: CPT | Performed by: NURSE PRACTITIONER

## 2022-11-17 PROCEDURE — 82607 VITAMIN B-12: CPT | Performed by: NURSE PRACTITIONER

## 2022-11-17 RX ORDER — PREDNISONE 10 MG/1
TABLET ORAL
Qty: 1 EACH | Refills: 0 | Status: SHIPPED | OUTPATIENT
Start: 2022-11-17

## 2022-11-17 RX ORDER — AZITHROMYCIN 250 MG/1
TABLET, FILM COATED ORAL
Qty: 6 TABLET | Refills: 0 | Status: SHIPPED | OUTPATIENT
Start: 2022-11-17

## 2022-11-17 NOTE — PROGRESS NOTES
Venipuncture Blood Specimen Collection  Venipuncture performed in right ac by Ashley Valdez MA with good hemostasis. Patient tolerated the procedure well without complications.   11/17/22   Ashley Valdez MA

## 2022-11-17 NOTE — PROGRESS NOTES
"Subjective   Duyen Odom is a 67 y.o. female presents for   Chief Complaint   Patient presents with   • Cough       Health Maintenance Due   Topic Date Due   • MAMMOGRAM  Never done   • DXA SCAN  Never done   • COLORECTAL CANCER SCREENING  Never done   • Pneumococcal Vaccine 65+ (1 - PCV) Never done   • TDAP/TD VACCINES (1 - Tdap) Never done   • ZOSTER VACCINE (1 of 2) Never done   • HEPATITIS C SCREENING  Never done   • ANNUAL WELLNESS VISIT  Never done   • COVID-19 Vaccine (3 - Booster) 02/28/2022   • INFLUENZA VACCINE  Never done   • LIPID PANEL  11/13/2022       History of Present Illness   Pt present with complaints of cough.  She states she has been coughing x 2 weeks and has taken delsym, tessalon perle, and cough syrup with codeine and states the cough syrup made her nauseasous.  She reports low grade fever the first week but denies fever this week.  She reports shortness of breath the past 2 weeks all the time.  She has been monitoring O2 at home.    She also requested recheck of anemia labs.    Vitals:    11/17/22 1300 11/17/22 1317   BP: (!) 159/104 160/84   BP Location:  Left arm   Patient Position:  Sitting   Pulse: 76    Temp: 98.9 °F (37.2 °C)    SpO2: 96%    Weight: 71.9 kg (158 lb 9.6 oz)    Height: 152.4 cm (60\")      Body mass index is 30.97 kg/m².    Current Outpatient Medications on File Prior to Visit   Medication Sig Dispense Refill   • aspirin 81 MG EC tablet Take 1 tablet by mouth Daily. (Patient taking differently: Take 81 mg by mouth Daily. dont take preop)     • ferrous sulfate 325 (65 FE) MG tablet Take 1 tablet by mouth Daily. 30 tablet 11   • metoprolol succinate XL (TOPROL-XL) 25 MG 24 hr tablet Take 1 tablet by mouth Daily. 30 tablet 0   • Multiple Vitamins-Minerals (MULTI-VITAMIN GUMMIES PO) Take 1 each by mouth Daily.     • nitroglycerin (NITROSTAT) 0.4 MG SL tablet Place 1 tablet under the tongue Every 5 (Five) Minutes As Needed for Chest Pain (Only if SBP Greater Than 100). " Take no more than 3 doses in 15 minutes. 100 tablet 0   • pantoprazole (PROTONIX) 40 MG EC tablet Take 1 tablet by mouth Daily. 30 tablet 3   • traZODone (DESYREL) 50 MG tablet Take 1 tablet by mouth Every Night. Start by taking only half tablet and only take a full tablet if it does not work 30 tablet 3     No current facility-administered medications on file prior to visit.       The following portions of the patient's history were reviewed and updated as appropriate: allergies, current medications, past family history, past medical history, past social history, past surgical history, and problem list.    Review of Systems   Constitutional: Negative for chills and fever.   HENT: Negative for sinus pressure and sore throat.    Eyes: Negative for blurred vision.   Respiratory: Positive for cough and shortness of breath.    Cardiovascular: Negative for chest pain.   Gastrointestinal: Negative for abdominal pain.   Musculoskeletal: Negative for arthralgias and joint swelling.   Skin: Negative for color change.   Neurological: Negative for dizziness.   Psychiatric/Behavioral: Negative for behavioral problems.       Objective   Physical Exam  Vitals and nursing note reviewed.   Constitutional:       Appearance: Normal appearance. She is well-developed. She is ill-appearing.   HENT:      Head: Normocephalic and atraumatic.      Right Ear: Tympanic membrane, ear canal and external ear normal.      Left Ear: Tympanic membrane, ear canal and external ear normal.      Nose: Nose normal.   Eyes:      Extraocular Movements: Extraocular movements intact.      Conjunctiva/sclera: Conjunctivae normal.      Pupils: Pupils are equal, round, and reactive to light.   Cardiovascular:      Rate and Rhythm: Normal rate and regular rhythm.      Heart sounds: Normal heart sounds.   Pulmonary:      Effort: Pulmonary effort is normal.      Breath sounds: Wheezing present.   Genitourinary:     Vagina: Normal.   Musculoskeletal:          General: Normal range of motion.      Cervical back: Normal range of motion and neck supple.   Skin:     General: Skin is warm and dry.   Neurological:      General: No focal deficit present.      Mental Status: She is alert and oriented to person, place, and time.   Psychiatric:         Mood and Affect: Mood normal.         Behavior: Behavior normal.         Judgment: Judgment normal.       PHQ-9 Total Score:      Assessment & Plan   Diagnoses and all orders for this visit:    1. Bronchitis (Primary)  -     azithromycin (Zithromax Z-Aneesh) 250 MG tablet; Take 2 tablets by mouth on day 1, then 1 tablet daily on days 2-5  Dispense: 6 tablet; Refill: 0  -     predniSONE (DELTASONE) 10 MG (21) dose pack; Use as directed on package  Dispense: 1 each; Refill: 0    2. Iron deficiency anemia, unspecified iron deficiency anemia type  -     CBC & Differential  -     Ferritin  -     Iron Profile  -     Reticulocytes  -     Vitamin B12  -     Folate    3. Primary hypertension  Comments:  slighlty improved during appt.  pt instructed to monitor as it may be elevated due to current illness.  if remains elevated, i will adjust medication.         There are no Patient Instructions on file for this visit.

## 2022-11-18 ENCOUNTER — TELEPHONE (OUTPATIENT)
Dept: FAMILY MEDICINE CLINIC | Facility: CLINIC | Age: 67
End: 2022-11-18

## 2022-11-18 NOTE — TELEPHONE ENCOUNTER
Caller: Duyen Odom    Relationship: Self    Best call back number: [phone]    Caller requesting test results: PATIENT     What test was performed: BLOOD WORK    When was the test performed: 11/17/22    Where was the test performed: OFFICE     Additional notes: REQUESTED CALLBACK WITH LAB RESULTS

## 2022-11-22 DIAGNOSIS — R05.9 COUGH: ICD-10-CM

## 2022-11-22 RX ORDER — BENZONATATE 100 MG/1
CAPSULE ORAL
Qty: 30 CAPSULE | Refills: 1 | OUTPATIENT
Start: 2022-11-22

## 2022-12-02 ENCOUNTER — TELEPHONE (OUTPATIENT)
Dept: FAMILY MEDICINE CLINIC | Facility: CLINIC | Age: 67
End: 2022-12-02

## 2022-12-02 DIAGNOSIS — R05.9 COUGH: ICD-10-CM

## 2022-12-02 RX ORDER — BENZONATATE 100 MG/1
100 CAPSULE ORAL 3 TIMES DAILY PRN
Qty: 30 CAPSULE | Refills: 1 | Status: SHIPPED | OUTPATIENT
Start: 2022-12-02

## 2022-12-02 NOTE — TELEPHONE ENCOUNTER
Caller: Duyen Odom    Relationship: Self    Best call back number: [phone]    Requested Prescriptions: [facility]    Pharmacy where request should be sent: [facility]Y #[id number] - [place], [place]  [address] - [phone] [facility] [phone] FX     Additional details provided by patient: THE PATIENT STATES THAT SHE WOULD LIKE A REFILL OF THIS MEDICATION DUE TO HER CONTINUOUS COUGH.    Does the patient have less than a 3 day supply:  [x] Yes  [] No    Would you like a call back once the refill request has been completed: [x] Yes [] No    If the office needs to give you a call back, can they leave a voicemail: [x] Yes [] No    Al Grant Rep   12/02/22 09:43 EST

## 2023-05-05 DIAGNOSIS — Z12.31 SCREENING MAMMOGRAM FOR BREAST CANCER: Primary | ICD-10-CM

## 2023-05-15 ENCOUNTER — TELEPHONE (OUTPATIENT)
Dept: FAMILY MEDICINE CLINIC | Facility: CLINIC | Age: 68
End: 2023-05-15
Payer: MEDICARE

## 2023-08-03 ENCOUNTER — OFFICE VISIT (OUTPATIENT)
Dept: FAMILY MEDICINE CLINIC | Facility: CLINIC | Age: 68
End: 2023-08-03
Payer: MEDICARE

## 2023-08-03 ENCOUNTER — TELEPHONE (OUTPATIENT)
Dept: FAMILY MEDICINE CLINIC | Facility: CLINIC | Age: 68
End: 2023-08-03

## 2023-08-03 VITALS
HEART RATE: 90 BPM | BODY MASS INDEX: 32.67 KG/M2 | SYSTOLIC BLOOD PRESSURE: 137 MMHG | WEIGHT: 166.4 LBS | OXYGEN SATURATION: 97 % | TEMPERATURE: 97.7 F | HEIGHT: 60 IN | DIASTOLIC BLOOD PRESSURE: 92 MMHG

## 2023-08-03 DIAGNOSIS — D50.9 IRON DEFICIENCY ANEMIA, UNSPECIFIED IRON DEFICIENCY ANEMIA TYPE: ICD-10-CM

## 2023-08-03 DIAGNOSIS — I10 PRIMARY HYPERTENSION: ICD-10-CM

## 2023-08-03 DIAGNOSIS — R13.10 DYSPHAGIA, UNSPECIFIED TYPE: ICD-10-CM

## 2023-08-03 DIAGNOSIS — E55.9 VITAMIN D DEFICIENCY: ICD-10-CM

## 2023-08-03 DIAGNOSIS — Z00.00 MEDICARE ANNUAL WELLNESS VISIT, INITIAL: Primary | ICD-10-CM

## 2023-08-03 DIAGNOSIS — G89.29 CHRONIC RIGHT-SIDED LOW BACK PAIN WITHOUT SCIATICA: ICD-10-CM

## 2023-08-03 DIAGNOSIS — Z00.01 ENCOUNTER FOR GENERAL ADULT MEDICAL EXAMINATION WITH ABNORMAL FINDINGS: ICD-10-CM

## 2023-08-03 DIAGNOSIS — N81.4 UTEROVAGINAL PROLAPSE: ICD-10-CM

## 2023-08-03 DIAGNOSIS — M54.50 CHRONIC RIGHT-SIDED LOW BACK PAIN WITHOUT SCIATICA: ICD-10-CM

## 2023-08-03 DIAGNOSIS — Z12.11 SCREEN FOR COLON CANCER: ICD-10-CM

## 2023-08-03 LAB
25(OH)D3 SERPL-MCNC: 18 NG/ML (ref 30–100)
ALBUMIN SERPL-MCNC: 3.9 G/DL (ref 3.5–5.2)
ALBUMIN/GLOB SERPL: 1.4 G/DL
ALP SERPL-CCNC: 81 U/L (ref 39–117)
ALT SERPL W P-5'-P-CCNC: 7 U/L (ref 1–33)
ANION GAP SERPL CALCULATED.3IONS-SCNC: 10 MMOL/L (ref 5–15)
AST SERPL-CCNC: 13 U/L (ref 1–32)
BASOPHILS # BLD AUTO: 0.04 10*3/MM3 (ref 0–0.2)
BASOPHILS NFR BLD AUTO: 0.8 % (ref 0–1.5)
BILIRUB SERPL-MCNC: 0.3 MG/DL (ref 0–1.2)
BUN SERPL-MCNC: 22 MG/DL (ref 8–23)
BUN/CREAT SERPL: 16.3 (ref 7–25)
CALCIUM SPEC-SCNC: 9.2 MG/DL (ref 8.6–10.5)
CHLORIDE SERPL-SCNC: 102 MMOL/L (ref 98–107)
CHOLEST SERPL-MCNC: 240 MG/DL (ref 0–200)
CO2 SERPL-SCNC: 28 MMOL/L (ref 22–29)
CREAT SERPL-MCNC: 1.35 MG/DL (ref 0.57–1)
DEPRECATED RDW RBC AUTO: 43.6 FL (ref 37–54)
EGFRCR SERPLBLD CKD-EPI 2021: 43.4 ML/MIN/1.73
EOSINOPHIL # BLD AUTO: 0.12 10*3/MM3 (ref 0–0.4)
EOSINOPHIL NFR BLD AUTO: 2.3 % (ref 0.3–6.2)
ERYTHROCYTE [DISTWIDTH] IN BLOOD BY AUTOMATED COUNT: 13.9 % (ref 12.3–15.4)
FERRITIN SERPL-MCNC: 28.6 NG/ML (ref 13–150)
FOLATE SERPL-MCNC: 5.03 NG/ML (ref 4.78–24.2)
GLOBULIN UR ELPH-MCNC: 2.7 GM/DL
GLUCOSE SERPL-MCNC: 88 MG/DL (ref 65–99)
HCT VFR BLD AUTO: 39.6 % (ref 34–46.6)
HDLC SERPL-MCNC: 70 MG/DL (ref 40–60)
HGB BLD-MCNC: 13.6 G/DL (ref 12–15.9)
IMM GRANULOCYTES # BLD AUTO: 0.05 10*3/MM3 (ref 0–0.05)
IMM GRANULOCYTES NFR BLD AUTO: 0.9 % (ref 0–0.5)
IRON 24H UR-MRATE: 77 MCG/DL (ref 37–145)
IRON SATN MFR SERPL: 20 % (ref 20–50)
LDLC SERPL CALC-MCNC: 149 MG/DL (ref 0–100)
LDLC/HDLC SERPL: 2.09 {RATIO}
LYMPHOCYTES # BLD AUTO: 1.31 10*3/MM3 (ref 0.7–3.1)
LYMPHOCYTES NFR BLD AUTO: 24.7 % (ref 19.6–45.3)
MCH RBC QN AUTO: 30 PG (ref 26.6–33)
MCHC RBC AUTO-ENTMCNC: 34.3 G/DL (ref 31.5–35.7)
MCV RBC AUTO: 87.4 FL (ref 79–97)
MONOCYTES # BLD AUTO: 0.49 10*3/MM3 (ref 0.1–0.9)
MONOCYTES NFR BLD AUTO: 9.2 % (ref 5–12)
NEUTROPHILS NFR BLD AUTO: 3.29 10*3/MM3 (ref 1.7–7)
NEUTROPHILS NFR BLD AUTO: 62.1 % (ref 42.7–76)
NRBC BLD AUTO-RTO: 0 /100 WBC (ref 0–0.2)
PLATELET # BLD AUTO: 323 10*3/MM3 (ref 140–450)
PMV BLD AUTO: 9.8 FL (ref 6–12)
POTASSIUM SERPL-SCNC: 4.5 MMOL/L (ref 3.5–5.2)
PROT SERPL-MCNC: 6.6 G/DL (ref 6–8.5)
RBC # BLD AUTO: 4.53 10*6/MM3 (ref 3.77–5.28)
RETICS # AUTO: 0.05 10*6/MM3 (ref 0.02–0.13)
RETICS/RBC NFR AUTO: 1.13 % (ref 0.7–1.9)
SODIUM SERPL-SCNC: 140 MMOL/L (ref 136–145)
TIBC SERPL-MCNC: 384 MCG/DL (ref 298–536)
TRANSFERRIN SERPL-MCNC: 258 MG/DL (ref 200–360)
TRIGL SERPL-MCNC: 118 MG/DL (ref 0–150)
TSH SERPL DL<=0.05 MIU/L-ACNC: 1.84 UIU/ML (ref 0.27–4.2)
VIT B12 BLD-MCNC: 366 PG/ML (ref 211–946)
VLDLC SERPL-MCNC: 21 MG/DL (ref 5–40)
WBC NRBC COR # BLD: 5.3 10*3/MM3 (ref 3.4–10.8)

## 2023-08-03 PROCEDURE — 80061 LIPID PANEL: CPT | Performed by: NURSE PRACTITIONER

## 2023-08-03 PROCEDURE — 85025 COMPLETE CBC W/AUTO DIFF WBC: CPT | Performed by: NURSE PRACTITIONER

## 2023-08-03 PROCEDURE — 80053 COMPREHEN METABOLIC PANEL: CPT | Performed by: NURSE PRACTITIONER

## 2023-08-03 PROCEDURE — 82306 VITAMIN D 25 HYDROXY: CPT | Performed by: NURSE PRACTITIONER

## 2023-08-03 PROCEDURE — 82746 ASSAY OF FOLIC ACID SERUM: CPT | Performed by: NURSE PRACTITIONER

## 2023-08-03 PROCEDURE — 82728 ASSAY OF FERRITIN: CPT | Performed by: NURSE PRACTITIONER

## 2023-08-03 PROCEDURE — 83540 ASSAY OF IRON: CPT | Performed by: NURSE PRACTITIONER

## 2023-08-03 PROCEDURE — 85045 AUTOMATED RETICULOCYTE COUNT: CPT | Performed by: NURSE PRACTITIONER

## 2023-08-03 PROCEDURE — 84443 ASSAY THYROID STIM HORMONE: CPT | Performed by: NURSE PRACTITIONER

## 2023-08-03 PROCEDURE — 82607 VITAMIN B-12: CPT | Performed by: NURSE PRACTITIONER

## 2023-08-03 PROCEDURE — 84466 ASSAY OF TRANSFERRIN: CPT | Performed by: NURSE PRACTITIONER

## 2023-08-03 RX ORDER — LISINOPRIL 10 MG/1
10 TABLET ORAL DAILY
Qty: 90 TABLET | Refills: 1 | Status: SHIPPED | OUTPATIENT
Start: 2023-08-03

## 2023-08-04 ENCOUNTER — HOSPITAL ENCOUNTER (OUTPATIENT)
Dept: GENERAL RADIOLOGY | Facility: HOSPITAL | Age: 68
Discharge: HOME | End: 2023-08-04
Admitting: NURSE PRACTITIONER
Payer: MEDICARE

## 2023-08-04 DIAGNOSIS — G89.29 CHRONIC RIGHT-SIDED LOW BACK PAIN WITHOUT SCIATICA: ICD-10-CM

## 2023-08-04 DIAGNOSIS — M54.50 CHRONIC RIGHT-SIDED LOW BACK PAIN WITHOUT SCIATICA: ICD-10-CM

## 2023-08-04 PROCEDURE — 72110 X-RAY EXAM L-2 SPINE 4/>VWS: CPT

## 2024-01-22 ENCOUNTER — PATIENT MESSAGE (OUTPATIENT)
Dept: FAMILY MEDICINE CLINIC | Facility: CLINIC | Age: 69
End: 2024-01-22

## 2024-01-22 DIAGNOSIS — Z12.31 ENCOUNTER FOR SCREENING MAMMOGRAM FOR MALIGNANT NEOPLASM OF BREAST: Primary | ICD-10-CM

## 2024-02-29 ENCOUNTER — TELEPHONE (OUTPATIENT)
Dept: FAMILY MEDICINE CLINIC | Facility: CLINIC | Age: 69
End: 2024-02-29
Payer: MEDICARE

## 2024-03-13 NOTE — TELEPHONE ENCOUNTER
03/13/24    Duyen Odom  [address] [place] [address]     Dear Duyen Odom,    Our records indicate that you have not returned your cologuard testing. Please contact our office if you need assistance. [phone]     If you choose not to follow through with Cologuard testing our Provider does recommend a colonoscopy.    Thank  you and have a great day,    [facility] Staff

## (undated) DEVICE — CATH DIAG IMPULSE FR4 5F 100CM

## (undated) DEVICE — PK TRY HEART CATH 50

## (undated) DEVICE — BITEBLOCK ENDO W/STRAP 60F A/ LF DISP

## (undated) DEVICE — CATH DIAG IMPULSE FL4 5F 100CM

## (undated) DEVICE — CATH DIAG IMPULSE PIG 5F 100CM

## (undated) DEVICE — PAPR PRNT PK SONY W RIBN UPC55

## (undated) DEVICE — PK ENDO GI 50

## (undated) DEVICE — SWAN-GANZ TRUE SIZE THERMODILUTION "S" TIP: Brand: SWAN-GANZ TRUE SIZE

## (undated) DEVICE — GW PTFE EMERALD HEPCOAT FC J TIP STD .035 3MM 150CM

## (undated) DEVICE — PINNACLE INTRODUCER SHEATH: Brand: PINNACLE